# Patient Record
Sex: MALE | Race: WHITE | Employment: FULL TIME | URBAN - METROPOLITAN AREA
[De-identification: names, ages, dates, MRNs, and addresses within clinical notes are randomized per-mention and may not be internally consistent; named-entity substitution may affect disease eponyms.]

---

## 2017-01-10 NOTE — PROGRESS NOTES
Chief Complaint:   Patient presents with:  Medication Follow-Up    HPI:   This is a 42year old male presenting for follow-up on ADD. Stable with extended release as well as short acting. He reports no side effects focus seems to be doing well.   Patient Status: Never Used                        Alcohol Use: Yes             Family History:  History reviewed. No pertinent family history.   Allergies:  No Known Allergies  Current Meds:    Current Outpatient Prescriptions:  Amphetamine-Dextroamphet ER (ADDERAL by mouth every 6 (six) hours as needed for Pain. Disp:  Rfl:       Counseling given: Not Answered       REVIEW OF SYSTEMS:   Review of Systems   Constitutional: Negative for fever, chills, diaphoresis and fatigue.    HENT: Negative for congestion, ear pain, well-developed and well-nourished. No distress. HENT:   Head: Normocephalic and atraumatic. Nose: Nose normal.   Mouth/Throat: Oropharynx is clear and moist. No oropharyngeal exudate or pharynx erythema.    Eyes: Conjunctivae and EOM are normal. Pupils SR 24 Hr; Take 1 capsule (20 mg total) by mouth daily.  -     Amphetamine-Dextroamphet ER (ADDERALL XR) 20 MG Oral Capsule SR 24 Hr; Take 1 capsule (20 mg total) by mouth daily. -     amphetamine-dextroamphetamine (ADDERALL) 10 MG Oral Tab;  Take 1 tablet

## 2017-01-11 ENCOUNTER — OFFICE VISIT (OUTPATIENT)
Dept: FAMILY MEDICINE CLINIC | Facility: CLINIC | Age: 42
End: 2017-01-11

## 2017-01-11 ENCOUNTER — HOSPITAL ENCOUNTER (OUTPATIENT)
Dept: GENERAL RADIOLOGY | Age: 42
Discharge: HOME | End: 2017-01-11
Attending: NURSE PRACTITIONER
Payer: COMMERCIAL

## 2017-01-11 VITALS
RESPIRATION RATE: 16 BRPM | BODY MASS INDEX: 38 KG/M2 | HEART RATE: 72 BPM | OXYGEN SATURATION: 98 % | WEIGHT: 279 LBS | TEMPERATURE: 98 F | SYSTOLIC BLOOD PRESSURE: 122 MMHG | DIASTOLIC BLOOD PRESSURE: 80 MMHG

## 2017-01-11 DIAGNOSIS — M79.671 PAIN OF RIGHT HEEL: ICD-10-CM

## 2017-01-11 DIAGNOSIS — M72.2 PLANTAR FASCIITIS, RIGHT: ICD-10-CM

## 2017-01-11 DIAGNOSIS — M79.671 PAIN OF RIGHT HEEL: Primary | ICD-10-CM

## 2017-01-11 PROCEDURE — 73650 X-RAY EXAM OF HEEL: CPT

## 2017-01-11 PROCEDURE — 99214 OFFICE O/P EST MOD 30 MIN: CPT | Performed by: NURSE PRACTITIONER

## 2017-01-11 RX ORDER — NAPROXEN 500 MG/1
500 TABLET ORAL 2 TIMES DAILY WITH MEALS
Qty: 28 TABLET | Refills: 0 | Status: SHIPPED | OUTPATIENT
Start: 2017-01-11 | End: 2017-01-25

## 2017-01-11 NOTE — PROGRESS NOTES
Patient presents with:  Heel Pain: Right heel    HPI:   Savana Dyson is a 42year old male present with complain of right heel pain. Patient reports he is on his feet at work walking on concrete floors all day long. No injury or trauma reported.   Ons on exertion  GI: denies abdominal pain and denies heartburn  NEURO: denies headaches  Musculoskeletal: heel pain as described above.      EXAM:   /80 mmHg  Pulse 72  Temp(Src) 98.2 °F (36.8 °C) (Oral)  Resp 16  Wt 279 lb  SpO2 98%  GENERAL: well devel

## 2017-02-07 ENCOUNTER — OFFICE VISIT (OUTPATIENT)
Dept: FAMILY MEDICINE CLINIC | Facility: CLINIC | Age: 42
End: 2017-02-07

## 2017-02-07 VITALS
RESPIRATION RATE: 18 BRPM | OXYGEN SATURATION: 98 % | DIASTOLIC BLOOD PRESSURE: 78 MMHG | TEMPERATURE: 98 F | SYSTOLIC BLOOD PRESSURE: 128 MMHG | HEART RATE: 88 BPM

## 2017-02-07 DIAGNOSIS — M79.671 PAIN OF RIGHT HEEL: Primary | ICD-10-CM

## 2017-02-07 PROCEDURE — 99212 OFFICE O/P EST SF 10 MIN: CPT | Performed by: PHYSICIAN ASSISTANT

## 2017-02-07 RX ORDER — NAPROXEN 500 MG/1
500 TABLET ORAL 2 TIMES DAILY WITH MEALS
Qty: 20 TABLET | Refills: 0 | Status: SHIPPED | OUTPATIENT
Start: 2017-02-07 | End: 2017-02-17

## 2017-02-07 NOTE — PROGRESS NOTES
CHIEF COMPLAINT:     Patient presents with:  Heel Pain: right heel pain. HPI:   Abelardo Davenport is a 42year old male who presents with complaints of right heel pain. Pain is rated as a 8/10.   Patient denies prior injury- has had this type of pain NEURO: Denies numbness, tingling, or weakness of extremities.     EXAM:   /78 mmHg  Pulse 88  Temp(Src) 98.4 °F (36.9 °C) (Oral)  Resp 18  SpO2 98%  GENERAL: well developed, well nourished, and in no apparent distress  SKIN: no rashes, no suspicious l Risk factors include: non-active lifestyle, arthritis, diabetes, obesity or recent weight gain, flat foot, high arch. Wearing high heels, loose shoes, or shoes with poor arch support for long periods of time adds to the risk.  This problem is commonly found · You may use over-the-counter pain medicine to control pain, unless another medicine was prescribed. Anti-inflammatory pain medicines, such as ibuprofen or naproxen, may work better than acetaminophen.  If you have chronic liver or kidney disease or ever h · Do activities that require a lot of running, jumping, or dancing  · Have a job that requires being on your feet for long periods  · Are overweight or obese  · Have certain foot problems, such as a tight Achilles tendon, flat feet, or high arches  · Often Possible complications of plantar fasciitis  Without proper care and treatment, healing may take longer than normal. Also, symptoms may continue or get worse. Over time, the plantar fascia may be damaged.  This can make it hard to walk or even stand without Every time your foot strikes the ground, the plantar fascia is stretched. You can reduce the strain on the plantar fascia and the possibility of overuse by following these suggestions:  · Lose any excess weight. · Avoid running on hard or uneven ground.

## 2017-02-07 NOTE — PATIENT INSTRUCTIONS
Plantar Fasciitis  Plantar fasciitis is a painful swelling of the plantar fascia. The plantar fascia is a thick, fibrous layer of tissue. It covers the bones on the bottom of your foot. And it supports the foot bones in an arched position.   This can happ · First thing in the morning and before sports, stretch the bottom of your feet. Gently flex your ankle so the toes move toward your knee. · Icing may help control heel pain. Apply an ice pack to the heel for 10-20 minutes as a preventive.  Or ice your tommy What causes plantar fasciitis? Plantar fasciitis most often occurs from overusing the plantar fascia. The tissue may become damaged from activities that put repeated stress on the heel and foot. Or it may wear down over time with age and ankle stiffness. · Doing exercises and physical therapy. These stretch and strengthen the plantar fascia and the muscles in the leg that support the heel and foot. · Getting shots of medicine into the foot. These may help relieve symptoms for a time. · Having surgery.  Douglas Yousif · To reduce symptoms caused by poor foot mechanics, your foot may be taped. This supports the arch and temporarily controls movement. Night splints may also help by stretching the fascia.   Control movement  If taping helps, your healthcare provider may pre

## 2017-02-10 ENCOUNTER — OFFICE VISIT (OUTPATIENT)
Dept: SLEEP CENTER | Facility: HOSPITAL | Age: 42
End: 2017-02-10
Attending: FAMILY MEDICINE
Payer: COMMERCIAL

## 2017-02-10 PROCEDURE — 95810 POLYSOM 6/> YRS 4/> PARAM: CPT

## 2017-02-15 NOTE — PROGRESS NOTES
Quick Note:    Pt will be contacted by CPAP coordinator and sleep consultation will be scheduled.   ______

## 2017-02-15 NOTE — PROCEDURES
[address] [address],[facility]       Accredited by the [facility] of Sleep Medicine (AASM)    PATIENT'S NAME:        LUZMARIA Willett  ATTENDING PHYSICIAN:   Glenna Chun M.D. REFERRING PHYSICIAN:   Kenneth Nair M.D.   PAT while in the lateral position and mostly during stage REM sleep with a REM AHI of 36 and non-REM AHI of 2. The patient had brief oxyhemoglobin desaturations with an oxygen saturation tiarra of 73%.   He spent approximately 1% of sleep time with oxygen level

## 2017-02-17 NOTE — TELEPHONE ENCOUNTER
Cialis LF: 10/10/16  Bupropion LF: 1/10/17  LOV: 1/10/17  Please approve or deny pending Rx. Thank you!

## 2017-02-17 NOTE — TELEPHONE ENCOUNTER
From: Chon Grider  To: Dwight Zuluaga MD  Sent: 2/16/2017 5:54 PM CST  Subject: Medication Renewal Request    Original authorizing provider: MD Chon Lee would like a refill of the following medications:   Tadalafil (CIALIS) 2

## 2017-03-10 PROBLEM — G47.33 OSA (OBSTRUCTIVE SLEEP APNEA): Status: ACTIVE | Noted: 2017-03-10

## 2017-03-10 NOTE — PROGRESS NOTES
Chief Complaint:   Patient presents with: Follow - Up    HPI:   This is a 42year old male presenting for follow-up on ADD. Stable with extended release as well as short acting. He reports no side effects focus seems to be doing well.   Patient reports o 24 Hr Take 1 capsule (20 mg total) by mouth daily. Disp: 30 capsule Rfl: 0   [START ON 5/9/2017] Amphetamine-Dextroamphet ER (ADDERALL XR) 20 MG Oral Capsule SR 24 Hr Take 1 capsule (20 mg total) by mouth daily.  Disp: 30 capsule Rfl: 0   BuPROPion HCl ER, Negative for dysuria, hematuria, flank pain, difficulty urinating and sexual dysfunction. Musculoskeletal: Negative for back pain, joint pain, gait problem, neck pain and neck stiffness. Skin: Negative for color change, pallor, rash and wound.    Allerg guarding. Musculoskeletal: Normal range of motion. He exhibits no tenderness or effusion. Lymphadenopathy:     He has no cervical adenopathy. Neurological: He is alert and oriented to person, place, and time.  No cranial nerve deficit or motor deficit

## 2017-03-20 ENCOUNTER — TELEPHONE (OUTPATIENT)
Dept: FAMILY MEDICINE CLINIC | Facility: CLINIC | Age: 42
End: 2017-03-20

## 2017-03-20 DIAGNOSIS — G47.33 OSA (OBSTRUCTIVE SLEEP APNEA): ICD-10-CM

## 2017-03-20 DIAGNOSIS — R06.83 SNORING: Primary | ICD-10-CM

## 2017-03-20 NOTE — TELEPHONE ENCOUNTER
Requesting a referral for Cpap titration, patient has an appointment in 2 weeks. If any questions please call John Silverman at [phone].

## 2017-03-20 NOTE — TELEPHONE ENCOUNTER
Called Rio and spoke with her. She states that [facility] requested that she gets a new order from the PCP. Patient is having CPAP titration sleep study done. Order placed in Epic for patient as requested. Rio notified of this information.

## 2017-04-10 ENCOUNTER — TELEPHONE (OUTPATIENT)
Dept: FAMILY MEDICINE CLINIC | Facility: CLINIC | Age: 42
End: 2017-04-10

## 2017-04-12 NOTE — TELEPHONE ENCOUNTER
Please contact pt and schedule the physical first so there are no coding/billing issues with lab orders. (Pt needs to have a physical in order to get physical labs covered.   He can do labs prior to physical, but we need it scheduled)  Some pts end up sche

## 2017-04-14 ENCOUNTER — OFFICE VISIT (OUTPATIENT)
Dept: SLEEP CENTER | Facility: HOSPITAL | Age: 42
End: 2017-04-14
Attending: INTERNAL MEDICINE
Payer: COMMERCIAL

## 2017-04-14 PROCEDURE — 95811 POLYSOM 6/>YRS CPAP 4/> PARM: CPT

## 2017-04-19 NOTE — PROCEDURES
[address] [address]       Accredited by the [facility] ([place] [facility])    PATIENT'S NAME:        LUZMARIA Cam  ATTENDING PHYSICIAN:   Mo Ferraro M.D. REFERRING PHYSICIAN:   Mo Ferraro M.D.   PAT MEASURES:  The patient was initiated on CPAP at 5 cm of water and titrated up to a final pressure of 10 cm of water. On this setting, the patient had an apnea-hypopnea index of 2 with an oxygen saturation tiarra of 90%.   While on this setting, the patient

## 2017-04-19 NOTE — PROGRESS NOTES
Quick Note:    Pt will be notified by CPAP coordinator and f/u in sleep clinic will be arranged.   ______

## 2017-06-02 ENCOUNTER — APPOINTMENT (OUTPATIENT)
Dept: GENERAL RADIOLOGY | Age: 42
End: 2017-06-02
Attending: PHYSICIAN ASSISTANT
Payer: COMMERCIAL

## 2017-06-02 ENCOUNTER — HOSPITAL ENCOUNTER (OUTPATIENT)
Age: 42
Discharge: HOME | End: 2017-06-02
Payer: COMMERCIAL

## 2017-06-02 VITALS
OXYGEN SATURATION: 99 % | HEART RATE: 81 BPM | WEIGHT: 276 LBS | SYSTOLIC BLOOD PRESSURE: 148 MMHG | DIASTOLIC BLOOD PRESSURE: 97 MMHG | RESPIRATION RATE: 20 BRPM | TEMPERATURE: 98 F | BODY MASS INDEX: 37.38 KG/M2 | HEIGHT: 72 IN

## 2017-06-02 DIAGNOSIS — S93.402A MODERATE LEFT ANKLE SPRAIN, INITIAL ENCOUNTER: Primary | ICD-10-CM

## 2017-06-02 PROCEDURE — 73610 X-RAY EXAM OF ANKLE: CPT | Performed by: PHYSICIAN ASSISTANT

## 2017-06-02 PROCEDURE — 99203 OFFICE O/P NEW LOW 30 MIN: CPT

## 2017-06-02 PROCEDURE — 99213 OFFICE O/P EST LOW 20 MIN: CPT

## 2017-06-02 NOTE — ED PROVIDER NOTES
Patient Seen in: THE [facility] In [place]    History   Patient presents with:   Ankle Injury: Lt.    Stated Complaint: LT ANKLE SPRAIN    HPI    43-year-old male who comes in today complaining of left ankle and foot pain after playing dodge ba Use: Yes               Review of Systems    Positive for stated complaint: LT ANKLE SPRAIN  Other systems are as noted in HPI. Constitutional and vital signs reviewed. All other systems reviewed and negative except as noted above.     PSFH elements re ankle mortise. Questionable area of old fracture or erosion involving the anterior most articular surface of the distal tibia as seen on the lateral view. Versus artifact. The medial and lateral malleolus appear intact as does the talus.  Incidental note ma

## 2017-06-02 NOTE — ED INITIAL ASSESSMENT (HPI)
Pt. Reports he injured his Lt. Ankle/foot about 5 days ago playing [employer]. Stepped on a ball with back part of foot. Foot rolled probably outward. States there was some swelling & discomfort, took ibuprofen & iced it.  The pain & swelling got worse in t

## 2017-06-28 DIAGNOSIS — N52.9 ERECTILE DYSFUNCTION, UNSPECIFIED ERECTILE DYSFUNCTION TYPE: ICD-10-CM

## 2017-06-28 RX ORDER — DEXTROAMPHETAMINE SACCHARATE, AMPHETAMINE ASPARTATE MONOHYDRATE, DEXTROAMPHETAMINE SULFATE AND AMPHETAMINE SULFATE 5; 5; 5; 5 MG/1; MG/1; MG/1; MG/1
20 CAPSULE, EXTENDED RELEASE ORAL DAILY
Qty: 30 CAPSULE | Refills: 0 | Status: SHIPPED | OUTPATIENT
Start: 2017-07-28 | End: 2017-08-27

## 2017-06-28 RX ORDER — DEXTROAMPHETAMINE SACCHARATE, AMPHETAMINE ASPARTATE MONOHYDRATE, DEXTROAMPHETAMINE SULFATE AND AMPHETAMINE SULFATE 5; 5; 5; 5 MG/1; MG/1; MG/1; MG/1
20 CAPSULE, EXTENDED RELEASE ORAL DAILY
Qty: 30 CAPSULE | Refills: 0 | Status: CANCELLED
Start: 2017-06-28 | End: 2017-07-28

## 2017-06-28 RX ORDER — TADALAFIL 20 MG/1
20 TABLET ORAL AS NEEDED
Qty: 24 TABLET | Refills: 0 | Status: SHIPPED | OUTPATIENT
Start: 2017-06-28 | End: 2017-12-13

## 2017-06-28 RX ORDER — DEXTROAMPHETAMINE SACCHARATE, AMPHETAMINE ASPARTATE MONOHYDRATE, DEXTROAMPHETAMINE SULFATE AND AMPHETAMINE SULFATE 5; 5; 5; 5 MG/1; MG/1; MG/1; MG/1
20 CAPSULE, EXTENDED RELEASE ORAL DAILY
Qty: 30 CAPSULE | Refills: 0 | Status: SHIPPED | OUTPATIENT
Start: 2017-06-28 | End: 2017-07-28

## 2017-06-28 RX ORDER — DEXTROAMPHETAMINE SACCHARATE, AMPHETAMINE ASPARTATE MONOHYDRATE, DEXTROAMPHETAMINE SULFATE AND AMPHETAMINE SULFATE 5; 5; 5; 5 MG/1; MG/1; MG/1; MG/1
20 CAPSULE, EXTENDED RELEASE ORAL DAILY
Qty: 30 CAPSULE | Refills: 0 | Status: SHIPPED | OUTPATIENT
Start: 2017-08-27 | End: 2017-09-26

## 2017-06-28 NOTE — TELEPHONE ENCOUNTER
From: Zander Aiken  Sent: 6/28/2017 7:09 AM CDT  Subject: Medication Renewal Request    Zander Aiken would like a refill of the following medications:   Tadalafil (CIALIS) 20 MG Oral Tab Anastasia Cheadle, MD]    Preferred pharmacy: [facility]

## 2017-06-29 ENCOUNTER — TELEPHONE (OUTPATIENT)
Dept: FAMILY MEDICINE CLINIC | Facility: CLINIC | Age: 42
End: 2017-06-29

## 2017-06-29 RX ORDER — DEXTROAMPHETAMINE SACCHARATE, AMPHETAMINE ASPARTATE, DEXTROAMPHETAMINE SULFATE AND AMPHETAMINE SULFATE 2.5; 2.5; 2.5; 2.5 MG/1; MG/1; MG/1; MG/1
10 TABLET ORAL DAILY
Qty: 30 TABLET | Refills: 0 | Status: SHIPPED | OUTPATIENT
Start: 2017-07-29 | End: 2017-08-28

## 2017-06-29 RX ORDER — DEXTROAMPHETAMINE SACCHARATE, AMPHETAMINE ASPARTATE, DEXTROAMPHETAMINE SULFATE AND AMPHETAMINE SULFATE 2.5; 2.5; 2.5; 2.5 MG/1; MG/1; MG/1; MG/1
10 TABLET ORAL DAILY
Qty: 30 TABLET | Refills: 0 | Status: SHIPPED | OUTPATIENT
Start: 2017-06-29 | End: 2017-07-29

## 2017-06-29 RX ORDER — DEXTROAMPHETAMINE SACCHARATE, AMPHETAMINE ASPARTATE, DEXTROAMPHETAMINE SULFATE AND AMPHETAMINE SULFATE 2.5; 2.5; 2.5; 2.5 MG/1; MG/1; MG/1; MG/1
10 TABLET ORAL DAILY
Qty: 30 TABLET | Refills: 0 | Status: SHIPPED | OUTPATIENT
Start: 2017-08-28 | End: 2017-09-27

## 2017-06-29 NOTE — TELEPHONE ENCOUNTER
From: Rakesh Sears  Sent: 6/28/2017 6:00 PM CDT  Subject: Medication Renewal Request    Rakesh Sears would like a refill of the following medications:  Amphetamine-Dextroamphet ER (ADDERALL XR) 20 MG Oral Capsule SR 24 Hr Paloma Peña MD]    P

## 2017-10-09 ENCOUNTER — OFFICE VISIT (OUTPATIENT)
Dept: FAMILY MEDICINE CLINIC | Facility: CLINIC | Age: 42
End: 2017-10-09

## 2017-10-09 VITALS
HEIGHT: 72 IN | DIASTOLIC BLOOD PRESSURE: 90 MMHG | SYSTOLIC BLOOD PRESSURE: 150 MMHG | RESPIRATION RATE: 16 BRPM | HEART RATE: 90 BPM | WEIGHT: 248 LBS | OXYGEN SATURATION: 97 % | TEMPERATURE: 98 F | BODY MASS INDEX: 33.59 KG/M2

## 2017-10-09 DIAGNOSIS — M25.572 CHRONIC PAIN OF LEFT ANKLE: ICD-10-CM

## 2017-10-09 DIAGNOSIS — M70.71 BURSITIS OF BOTH HIPS, UNSPECIFIED BURSA: ICD-10-CM

## 2017-10-09 DIAGNOSIS — Z23 NEEDS FLU SHOT: Primary | ICD-10-CM

## 2017-10-09 DIAGNOSIS — G89.29 CHRONIC PAIN OF LEFT ANKLE: ICD-10-CM

## 2017-10-09 DIAGNOSIS — M70.72 BURSITIS OF BOTH HIPS, UNSPECIFIED BURSA: ICD-10-CM

## 2017-10-09 DIAGNOSIS — F98.8 ATTENTION DEFICIT DISORDER (ADD) WITHOUT HYPERACTIVITY: ICD-10-CM

## 2017-10-09 DIAGNOSIS — S96.912A MUSCLE STRAIN OF LEFT FOOT, INITIAL ENCOUNTER: ICD-10-CM

## 2017-10-09 PROCEDURE — 90471 IMMUNIZATION ADMIN: CPT | Performed by: FAMILY MEDICINE

## 2017-10-09 PROCEDURE — 90686 IIV4 VACC NO PRSV 0.5 ML IM: CPT | Performed by: FAMILY MEDICINE

## 2017-10-09 PROCEDURE — 99214 OFFICE O/P EST MOD 30 MIN: CPT | Performed by: FAMILY MEDICINE

## 2017-10-09 RX ORDER — DEXTROAMPHETAMINE SACCHARATE, AMPHETAMINE ASPARTATE, DEXTROAMPHETAMINE SULFATE AND AMPHETAMINE SULFATE 2.5; 2.5; 2.5; 2.5 MG/1; MG/1; MG/1; MG/1
10 TABLET ORAL DAILY
Qty: 30 TABLET | Refills: 0 | Status: SHIPPED | OUTPATIENT
Start: 2017-10-09 | End: 2017-11-08

## 2017-10-09 RX ORDER — DEXTROAMPHETAMINE SACCHARATE, AMPHETAMINE ASPARTATE, DEXTROAMPHETAMINE SULFATE AND AMPHETAMINE SULFATE 2.5; 2.5; 2.5; 2.5 MG/1; MG/1; MG/1; MG/1
10 TABLET ORAL DAILY
Qty: 30 TABLET | Refills: 0 | Status: SHIPPED | OUTPATIENT
Start: 2017-12-08 | End: 2018-01-07

## 2017-10-09 RX ORDER — DEXTROAMPHETAMINE SACCHARATE, AMPHETAMINE ASPARTATE MONOHYDRATE, DEXTROAMPHETAMINE SULFATE AND AMPHETAMINE SULFATE 5; 5; 5; 5 MG/1; MG/1; MG/1; MG/1
20 CAPSULE, EXTENDED RELEASE ORAL DAILY
Qty: 30 CAPSULE | Refills: 0 | Status: SHIPPED | OUTPATIENT
Start: 2017-12-08 | End: 2018-01-07

## 2017-10-09 RX ORDER — DEXTROAMPHETAMINE SACCHARATE, AMPHETAMINE ASPARTATE MONOHYDRATE, DEXTROAMPHETAMINE SULFATE AND AMPHETAMINE SULFATE 5; 5; 5; 5 MG/1; MG/1; MG/1; MG/1
20 CAPSULE, EXTENDED RELEASE ORAL DAILY
Qty: 30 CAPSULE | Refills: 0 | Status: SHIPPED | OUTPATIENT
Start: 2017-10-09 | End: 2017-11-08

## 2017-10-09 RX ORDER — DEXTROAMPHETAMINE SACCHARATE, AMPHETAMINE ASPARTATE, DEXTROAMPHETAMINE SULFATE AND AMPHETAMINE SULFATE 2.5; 2.5; 2.5; 2.5 MG/1; MG/1; MG/1; MG/1
10 TABLET ORAL DAILY
Qty: 30 TABLET | Refills: 0 | Status: SHIPPED | OUTPATIENT
Start: 2017-11-08 | End: 2017-12-08

## 2017-10-09 RX ORDER — DEXTROAMPHETAMINE SACCHARATE, AMPHETAMINE ASPARTATE MONOHYDRATE, DEXTROAMPHETAMINE SULFATE AND AMPHETAMINE SULFATE 5; 5; 5; 5 MG/1; MG/1; MG/1; MG/1
20 CAPSULE, EXTENDED RELEASE ORAL DAILY
Qty: 30 CAPSULE | Refills: 0 | Status: SHIPPED | OUTPATIENT
Start: 2017-11-08 | End: 2017-12-08

## 2017-10-09 RX ORDER — IBUPROFEN AND FAMOTIDINE 800; 26.6 MG/1; MG/1
TABLET, COATED ORAL
Refills: 0 | COMMUNITY
Start: 2017-09-15 | End: 2018-06-01

## 2017-10-09 NOTE — PROGRESS NOTES
Chief Complaint:   Patient presents with: Follow - Up: medication FU, left ankle painful to walk, left knee, left stomach pain    HPI:   This is a 36year old male presenting for follow up on left ankle pain, left knee pain and left foot pain x few month. Outpatient Prescriptions:  DUEXIS 800-26.6 MG Oral Tab TK 1 T PO Q 6 TO 8 H PRN Disp:  Rfl: 0   Amphetamine-Dextroamphet ER (ADDERALL XR) 20 MG Oral Capsule SR 24 Hr Take 1 capsule (20 mg total) by mouth daily.  Disp: 30 capsule Rfl: 0   [START ON 11/8/2017 palpitations and leg swelling. Gastrointestinal: Negative for vomiting, abdominal pain, diarrhea, blood in stool and abdominal distention. Endocrine: Negative for cold intolerance, heat intolerance, polydipsia, polyphagia and polyuria.    Genitourinary: rate, regular rhythm, normal heart sounds and intact distal pulses. No murmur heard. Edema not present. Pulmonary/Chest: Effort normal and breath sounds normal. No stridor. No respiratory distress. He has no wheezes. Abdominal: Soft.  Bowel sounds a shot    - INFLUENZA VIRUS VACCINE, QUAD, PRESERVATIVE FREE, 0.5 ML    Follow up in 3 months.

## 2017-10-18 DIAGNOSIS — F41.1 GAD (GENERALIZED ANXIETY DISORDER): ICD-10-CM

## 2017-10-19 RX ORDER — BUPROPION HYDROCHLORIDE 150 MG/1
TABLET, EXTENDED RELEASE ORAL
Qty: 60 TABLET | Refills: 0 | Status: SHIPPED | OUTPATIENT
Start: 2017-10-19 | End: 2017-11-27

## 2017-11-20 PROBLEM — S93.402A SPRAIN OF LEFT ANKLE, UNSPECIFIED LIGAMENT, INITIAL ENCOUNTER: Status: ACTIVE | Noted: 2017-11-20

## 2017-11-27 DIAGNOSIS — F41.1 GAD (GENERALIZED ANXIETY DISORDER): ICD-10-CM

## 2017-11-28 RX ORDER — BUPROPION HYDROCHLORIDE 150 MG/1
150 TABLET, EXTENDED RELEASE ORAL 2 TIMES DAILY
Qty: 60 TABLET | Refills: 0 | Status: SHIPPED | OUTPATIENT
Start: 2017-11-28 | End: 2017-12-23

## 2017-11-28 NOTE — TELEPHONE ENCOUNTER
From: Yuriy Rodrigues  Sent: 11/27/2017 6:26 PM CST  Subject: Medication Renewal Request    Yuriy Rodrigues would like a refill of the following medications:     BUPROPION HCL ER, SR, 150 MG Oral Tablet 12 Hr Emily Mohamud MD]    Preferred pharmacy:

## 2017-11-29 ENCOUNTER — HOSPITAL ENCOUNTER (OUTPATIENT)
Dept: MRI IMAGING | Age: 42
Discharge: HOME | End: 2017-11-29
Attending: ORTHOPAEDIC SURGERY
Payer: COMMERCIAL

## 2017-11-29 DIAGNOSIS — S93.402A SPRAIN OF UNSPECIFIED LIGAMENT OF LEFT ANKLE, INITIAL ENCOUNTER: ICD-10-CM

## 2017-11-29 PROCEDURE — 73721 MRI JNT OF LWR EXTRE W/O DYE: CPT | Performed by: ORTHOPAEDIC SURGERY

## 2017-12-13 DIAGNOSIS — N52.9 ERECTILE DYSFUNCTION, UNSPECIFIED ERECTILE DYSFUNCTION TYPE: ICD-10-CM

## 2017-12-13 NOTE — TELEPHONE ENCOUNTER
LOV 10/9/17           LF 6/28/17 #24 with 0 refills      Please approve or deny Rx request.  Thank you!

## 2017-12-13 NOTE — TELEPHONE ENCOUNTER
From: Areli Rodriguez  Sent: 12/13/2017 1:46 PM CST  Subject: Medication Renewal [address] [place] [address] would like a refill of the following medications:      Tadalafil (CIALIS) 20 MG Oral Tab Carlie Abebe MD]    Preferred pharmacy: [facility]

## 2017-12-14 PROBLEM — S86.312A TEAR OF PERONEAL TENDON, LEFT, INITIAL ENCOUNTER: Status: ACTIVE | Noted: 2017-12-14

## 2017-12-14 RX ORDER — TADALAFIL 20 MG/1
20 TABLET ORAL AS NEEDED
Qty: 24 TABLET | Refills: 0 | Status: SHIPPED | OUTPATIENT
Start: 2017-12-14 | End: 2018-05-30

## 2017-12-16 ENCOUNTER — OFFICE VISIT (OUTPATIENT)
Dept: FAMILY MEDICINE CLINIC | Facility: CLINIC | Age: 42
End: 2017-12-16

## 2017-12-16 VITALS
OXYGEN SATURATION: 98 % | RESPIRATION RATE: 18 BRPM | DIASTOLIC BLOOD PRESSURE: 86 MMHG | BODY MASS INDEX: 37.79 KG/M2 | TEMPERATURE: 99 F | HEIGHT: 72 IN | WEIGHT: 279 LBS | SYSTOLIC BLOOD PRESSURE: 142 MMHG | HEART RATE: 84 BPM

## 2017-12-16 DIAGNOSIS — J11.1 INFLUENZA-LIKE ILLNESS: Primary | ICD-10-CM

## 2017-12-16 PROCEDURE — 99213 OFFICE O/P EST LOW 20 MIN: CPT | Performed by: NURSE PRACTITIONER

## 2017-12-16 RX ORDER — CODEINE PHOSPHATE AND GUAIFENESIN 10; 100 MG/5ML; MG/5ML
10 SOLUTION ORAL NIGHTLY PRN
Qty: 118 ML | Refills: 0 | Status: SHIPPED | OUTPATIENT
Start: 2017-12-16 | End: 2017-12-23

## 2017-12-16 NOTE — PATIENT INSTRUCTIONS
Humidifier in room  Sleep propped  Push fluids  Limit dairy  Mucinex as directed      Influenza (Adult)    Influenza is also called the flu. It is a viral illness that affects the air passages of your lungs. It is different from the common cold.  The flu Follow up with your healthcare provider, or as advised, if you are not getting better over the next week. If you are age 72 or older, talk with your provider about getting a pneumococcal vaccine every 5 years.  You should also get this vaccine if you have

## 2017-12-16 NOTE — PROGRESS NOTES
Patient presents with:  Fever: cough, sore throat, right side ear problem, fever at home 101, loss of appetite, nausea, fatigue, dizziness, sinus congestion x4 days (fever reducer and cough medicine taken)  :    HPI:   Radha Chris is a 36year old m • Osteoarthrosis, unspecified whether generalized or localized, unspecified site       Past Surgical History:  No date: ANKLE FRACTURE SURGERY  10/26/2015: DRAIN/INJECT LARGE JOINT/BURSA Bilateral      Comment: Procedure: BURSA INJECTION;  Surgeon: Andi Gallagher, GI: good BS's,no masses, HSM or tenderness  EXTREMITIES: no cyanosis, clubbing or edema  LYMPH:  + anterior cervical lymphadenopathy. ASSESSMENT AND PLAN:   Jamal Eisenberg is a 36year old male who presents with influenza.     ASSESSMENT:  Influe Symptoms of the flu may be mild or severe. They can include extreme tiredness (wanting to stay in bed all day), chills, fevers, muscle aches, soreness with eye movement, headache, and a dry, hacking cough.   Home care  Follow these guidelines when caring fo · Severe weakness or dizziness  · You get a new fever or cough after getting better for a few days  Date Last Reviewed: 1/1/2017  © 4852-2452 [employer]. [address] [facility], [address] [place] [address]. All rights reserved.  This information is not

## 2017-12-18 ENCOUNTER — TELEPHONE (OUTPATIENT)
Dept: FAMILY MEDICINE CLINIC | Facility: CLINIC | Age: 42
End: 2017-12-18

## 2017-12-22 NOTE — TELEPHONE ENCOUNTER
PA for Cialis approved through Cover My Meds. Message left for pharmacist at [facility] that fill script and notify patient.

## 2017-12-23 DIAGNOSIS — F41.1 GAD (GENERALIZED ANXIETY DISORDER): ICD-10-CM

## 2017-12-26 RX ORDER — BUPROPION HYDROCHLORIDE 150 MG/1
TABLET, EXTENDED RELEASE ORAL
Qty: 60 TABLET | Refills: 0 | Status: SHIPPED | OUTPATIENT
Start: 2017-12-26 | End: 2018-01-28

## 2017-12-28 ENCOUNTER — OFFICE VISIT (OUTPATIENT)
Dept: PHYSICAL THERAPY | Age: 42
End: 2017-12-28
Attending: ORTHOPAEDIC SURGERY
Payer: COMMERCIAL

## 2017-12-28 DIAGNOSIS — S93.402A SPRAIN OF LEFT ANKLE, UNSPECIFIED LIGAMENT, INITIAL ENCOUNTER: ICD-10-CM

## 2017-12-28 DIAGNOSIS — S86.312A TEAR OF PERONEAL TENDON, LEFT, INITIAL ENCOUNTER: ICD-10-CM

## 2017-12-28 PROCEDURE — 97162 PT EVAL MOD COMPLEX 30 MIN: CPT

## 2017-12-28 PROCEDURE — 97110 THERAPEUTIC EXERCISES: CPT

## 2017-12-28 NOTE — PROGRESS NOTES
LOWER EXTREMITY EVALUATION:   Referring Physician: Dr. Tutu Rodriguez  Diagnosis: Tear of peroneal tendon, left, initial encounter (F34.899P)     Date of Service: 12/28/2017     PATIENT [address] Lee is a 36year old y/o male who presents to therapy MRI-  ATFL and CFL tear and peroneus brevis tear. ASSESSMENT  Mr. Babatunde Taveras has a hx of significant bilateral ankle sprains. He demonstrates L ankle lateral laxity and subacute stage of condition.  He demonstrates loss of functional strength and contro Accessory motion: Calcaneal eversion Grade 2 restriction bilaterally. MTP D/V and V/D grade 2 restriction bilaterally.  To assessed TC DV and VD further at next sessions    Flexibility:  Hip Flexor: R 5, L 5  Hamstrings: R 65; L 60  Piriformis: R Restrited; Frequency / Duration: Patient will be seen for 2 x/week or a total of 8 visits (authorized)over a 90 day period. Treatment will include: Manual Therapy; Therapeutic Exercises; Neuromuscular Re-education;  Therapeutic Activity; Gait Training; Electrical Stim

## 2018-01-02 ENCOUNTER — APPOINTMENT (OUTPATIENT)
Dept: PHYSICAL THERAPY | Age: 43
End: 2018-01-02
Attending: ORTHOPAEDIC SURGERY
Payer: COMMERCIAL

## 2018-01-03 ENCOUNTER — APPOINTMENT (OUTPATIENT)
Dept: PHYSICAL THERAPY | Age: 43
End: 2018-01-03
Attending: ORTHOPAEDIC SURGERY
Payer: COMMERCIAL

## 2018-01-04 ENCOUNTER — APPOINTMENT (OUTPATIENT)
Dept: PHYSICAL THERAPY | Age: 43
End: 2018-01-04
Attending: ORTHOPAEDIC SURGERY
Payer: COMMERCIAL

## 2018-01-08 ENCOUNTER — OFFICE VISIT (OUTPATIENT)
Dept: PHYSICAL THERAPY | Age: 43
End: 2018-01-08
Attending: ORTHOPAEDIC SURGERY
Payer: COMMERCIAL

## 2018-01-08 PROCEDURE — 97110 THERAPEUTIC EXERCISES: CPT | Performed by: PHYSICAL THERAPIST

## 2018-01-08 PROCEDURE — 97140 MANUAL THERAPY 1/> REGIONS: CPT | Performed by: PHYSICAL THERAPIST

## 2018-01-08 PROCEDURE — 97112 NEUROMUSCULAR REEDUCATION: CPT | Performed by: PHYSICAL THERAPIST

## 2018-01-08 NOTE — PROGRESS NOTES
Referring Physician: Dr. Shahrzad Lee    Dx: Tear of peroneal tendon, left, initial encounter (K99.911Q)          Authorized # of Visits:  8         Next MD visit: none scheduled  Fall Risk: standard         Precautions: n/a           Subjective: Pt states that h 4/ Date:               TX#: 5/ Date:               TX#: 6/ Date:               TX#: 7/ Date:               TX#: 8/   Nustep x 8', L-2         Gastro soleus stretch x 2, 30 sec         BB AP tilts x 1 min         BB balance AP and lat 1 min each         DLP

## 2018-01-09 ENCOUNTER — APPOINTMENT (OUTPATIENT)
Dept: PHYSICAL THERAPY | Age: 43
End: 2018-01-09
Attending: ORTHOPAEDIC SURGERY
Payer: COMMERCIAL

## 2018-01-10 ENCOUNTER — OFFICE VISIT (OUTPATIENT)
Dept: PHYSICAL THERAPY | Age: 43
End: 2018-01-10
Attending: ORTHOPAEDIC SURGERY
Payer: COMMERCIAL

## 2018-01-10 PROCEDURE — 97112 NEUROMUSCULAR REEDUCATION: CPT | Performed by: PHYSICAL THERAPIST

## 2018-01-10 PROCEDURE — 97110 THERAPEUTIC EXERCISES: CPT | Performed by: PHYSICAL THERAPIST

## 2018-01-10 PROCEDURE — 97140 MANUAL THERAPY 1/> REGIONS: CPT | Performed by: PHYSICAL THERAPIST

## 2018-01-10 NOTE — PROGRESS NOTES
Referring Physician: Dr. Anthony Driscoll    Dx: Tear of peroneal tendon, left, initial encounter (Q35.705H)          Authorized # of Visits:  8         Next MD visit: none scheduled  Fall Risk: standard         Precautions: n/a           Subjective: Pt states that h progression.    Date: 1/8/2018 TX#: 2/8  Date: 1/10/2018               TX#: 3/   Date:               TX#: 4/ Date:               TX#: 5/ Date:               TX#: 6/ Date:               TX#: 7/ Date:               TX#: 8/   Nustep x 8', L-2 x        Gastro s

## 2018-01-11 ENCOUNTER — APPOINTMENT (OUTPATIENT)
Dept: PHYSICAL THERAPY | Age: 43
End: 2018-01-11
Attending: ORTHOPAEDIC SURGERY
Payer: COMMERCIAL

## 2018-01-17 ENCOUNTER — OFFICE VISIT (OUTPATIENT)
Dept: PHYSICAL THERAPY | Age: 43
End: 2018-01-17
Attending: ORTHOPAEDIC SURGERY
Payer: COMMERCIAL

## 2018-01-17 PROCEDURE — 97112 NEUROMUSCULAR REEDUCATION: CPT | Performed by: PHYSICAL THERAPIST

## 2018-01-17 PROCEDURE — 97140 MANUAL THERAPY 1/> REGIONS: CPT | Performed by: PHYSICAL THERAPIST

## 2018-01-17 PROCEDURE — 97110 THERAPEUTIC EXERCISES: CPT | Performed by: PHYSICAL THERAPIST

## 2018-01-17 NOTE — PROGRESS NOTES
Referring Physician: Dr. Gogo Hensley    Dx: Tear of peroneal tendon, left, initial encounter (P43.929K)          Authorized # of Visits:  8         Next MD visit: none scheduled  Fall Risk: standard         Precautions: n/a           Subjective: Pt states that h TX#: 5/ Date:               TX#: 6/ Date:               TX#: 7/ Date:               TX#: 8/   Nustep x 8', L-2 x X 8' L-2        Gastro soleus stretch x 2, 30 sec x X 1 min each        BB AP tilts x 1 min X  X 1 min AP & lat       BB balance AP and l

## 2018-01-22 ENCOUNTER — OFFICE VISIT (OUTPATIENT)
Dept: PHYSICAL THERAPY | Age: 43
End: 2018-01-22
Attending: ORTHOPAEDIC SURGERY
Payer: COMMERCIAL

## 2018-01-22 PROCEDURE — 97112 NEUROMUSCULAR REEDUCATION: CPT | Performed by: PHYSICAL THERAPIST

## 2018-01-22 PROCEDURE — 97140 MANUAL THERAPY 1/> REGIONS: CPT | Performed by: PHYSICAL THERAPIST

## 2018-01-22 PROCEDURE — 97110 THERAPEUTIC EXERCISES: CPT | Performed by: PHYSICAL THERAPIST

## 2018-01-22 NOTE — PROGRESS NOTES
Referring Physician: Dr. Mark Serrano    Dx: Tear of peroneal tendon, left, initial encounter (W31.574Z)          Authorized # of Visits:  8         Next MD visit: none scheduled  Fall Risk: standard         Precautions: n/a           Subjective: Pt states rates 1/8/2018 TX#: 2/8  Date: 1/10/2018               TX#: 3/   Date:   1/17/2018            TX#: 4/ Date: 1/22/2018             TX#: 5/ Date:               TX#: 6/ Date:               TX#: 7/ Date:               TX#: 8/   Nustep x 8', L-2 x X 8' L-2  X 8'

## 2018-01-24 ENCOUNTER — OFFICE VISIT (OUTPATIENT)
Dept: PHYSICAL THERAPY | Age: 43
End: 2018-01-24
Attending: ORTHOPAEDIC SURGERY
Payer: COMMERCIAL

## 2018-01-24 PROCEDURE — 97140 MANUAL THERAPY 1/> REGIONS: CPT | Performed by: PHYSICAL THERAPIST

## 2018-01-24 PROCEDURE — 97110 THERAPEUTIC EXERCISES: CPT | Performed by: PHYSICAL THERAPIST

## 2018-01-24 PROCEDURE — 97112 NEUROMUSCULAR REEDUCATION: CPT | Performed by: PHYSICAL THERAPIST

## 2018-01-24 NOTE — PROGRESS NOTES
Referring Physician: Dr. Shaun Hernandez    Progress note:     Dx:  Tear of peroneal tendon, left, initial encounter (R41.405O)          Authorized # of Visits:  8         Next MD visit: none scheduled  Fall Risk: standard         Precautions: n/a           Hernandez restore normal joint mechanics and decrease pain ;  Therapeutic exercises including ROM, strengthening, stretching program; Neuromuscular re-education for proprioceptive/balance training, pelvic and core stabilization; Pt. education for posture, body mechan

## 2018-01-28 DIAGNOSIS — F41.1 GAD (GENERALIZED ANXIETY DISORDER): ICD-10-CM

## 2018-01-29 ENCOUNTER — OFFICE VISIT (OUTPATIENT)
Dept: PHYSICAL THERAPY | Age: 43
End: 2018-01-29
Attending: ORTHOPAEDIC SURGERY
Payer: COMMERCIAL

## 2018-01-29 PROCEDURE — 97110 THERAPEUTIC EXERCISES: CPT | Performed by: PHYSICAL THERAPIST

## 2018-01-29 PROCEDURE — 97140 MANUAL THERAPY 1/> REGIONS: CPT | Performed by: PHYSICAL THERAPIST

## 2018-01-29 PROCEDURE — 97112 NEUROMUSCULAR REEDUCATION: CPT | Performed by: PHYSICAL THERAPIST

## 2018-01-29 RX ORDER — DEXTROAMPHETAMINE SACCHARATE, AMPHETAMINE ASPARTATE MONOHYDRATE, DEXTROAMPHETAMINE SULFATE AND AMPHETAMINE SULFATE 5; 5; 5; 5 MG/1; MG/1; MG/1; MG/1
20 CAPSULE, EXTENDED RELEASE ORAL DAILY
Qty: 30 CAPSULE | Refills: 0 | Status: SHIPPED | OUTPATIENT
Start: 2018-03-30 | End: 2018-04-29

## 2018-01-29 RX ORDER — DEXTROAMPHETAMINE SACCHARATE, AMPHETAMINE ASPARTATE MONOHYDRATE, DEXTROAMPHETAMINE SULFATE AND AMPHETAMINE SULFATE 5; 5; 5; 5 MG/1; MG/1; MG/1; MG/1
20 CAPSULE, EXTENDED RELEASE ORAL DAILY
Qty: 30 CAPSULE | Refills: 0 | Status: SHIPPED | OUTPATIENT
Start: 2018-02-28 | End: 2018-03-30

## 2018-01-29 RX ORDER — DEXTROAMPHETAMINE SACCHARATE, AMPHETAMINE ASPARTATE MONOHYDRATE, DEXTROAMPHETAMINE SULFATE AND AMPHETAMINE SULFATE 5; 5; 5; 5 MG/1; MG/1; MG/1; MG/1
20 CAPSULE, EXTENDED RELEASE ORAL DAILY
Qty: 30 CAPSULE | Refills: 0 | Status: SHIPPED | OUTPATIENT
Start: 2018-01-29 | End: 2018-02-28

## 2018-01-29 RX ORDER — DEXTROAMPHETAMINE SACCHARATE, AMPHETAMINE ASPARTATE, DEXTROAMPHETAMINE SULFATE AND AMPHETAMINE SULFATE 2.5; 2.5; 2.5; 2.5 MG/1; MG/1; MG/1; MG/1
10 TABLET ORAL DAILY
Qty: 30 TABLET | Refills: 0 | Status: SHIPPED | OUTPATIENT
Start: 2018-02-28 | End: 2018-03-30

## 2018-01-29 RX ORDER — BUPROPION HYDROCHLORIDE 150 MG/1
150 TABLET, EXTENDED RELEASE ORAL 2 TIMES DAILY
Qty: 60 TABLET | Refills: 2 | Status: SHIPPED | OUTPATIENT
Start: 2018-01-29 | End: 2019-03-22

## 2018-01-29 RX ORDER — DEXTROAMPHETAMINE SACCHARATE, AMPHETAMINE ASPARTATE, DEXTROAMPHETAMINE SULFATE AND AMPHETAMINE SULFATE 2.5; 2.5; 2.5; 2.5 MG/1; MG/1; MG/1; MG/1
10 TABLET ORAL DAILY
Qty: 30 TABLET | Refills: 0 | Status: SHIPPED | OUTPATIENT
Start: 2018-01-29 | End: 2018-02-28

## 2018-01-29 RX ORDER — DEXTROAMPHETAMINE SACCHARATE, AMPHETAMINE ASPARTATE, DEXTROAMPHETAMINE SULFATE AND AMPHETAMINE SULFATE 2.5; 2.5; 2.5; 2.5 MG/1; MG/1; MG/1; MG/1
10 TABLET ORAL DAILY
Qty: 30 TABLET | Refills: 0 | Status: SHIPPED | OUTPATIENT
Start: 2018-03-30 | End: 2018-04-29

## 2018-01-29 NOTE — TELEPHONE ENCOUNTER
From: Wandy Meyer  Sent: 1/28/2018 8:35 AM CST  Subject: Medication Renewal Request    Wandy Meyer would like a refill of the following medications:     BUPROPION HCL ER, SR, 150 MG Oral Tablet 12 Hr Akin Benson MD]   Patient Comment: I need refill for adderall xr 20mg and adderall 10mg Thank you    Preferred pharmacy: [facility] [address] [place] [address], [address] [place] [address], [phone], [phone]

## 2018-01-29 NOTE — TELEPHONE ENCOUNTER
Bupropion LF: 12/26/17  Adderall both LF: 10/9/17 for 3 months  LOV: 10/9/17    Please approve or deny pending Rx. Thank you!

## 2018-01-29 NOTE — PROGRESS NOTES
Referring Physician: Dr. Jessica Bryant    Progress note:     Dx:  Tear of peroneal tendon, left, initial encounter (F90.628X)          Authorized # of Visits:  8         Next MD visit: none scheduled  Fall Risk: standard         Precautions: n/a           Osie Markie 1/8/2018 TX#: 2/8  Date: 1/10/2018               TX#: 3/   Date:   1/17/2018            TX#: 4/ Date: 1/22/2018             TX#: 5/ Date: 1/24/2018            TX#: 6/ Date: 1/29/2018                 TX#: 7/ Date:               TX#: 8/   Nustep x 8', L-2 x

## 2018-02-14 ENCOUNTER — OFFICE VISIT (OUTPATIENT)
Dept: PHYSICAL THERAPY | Age: 43
End: 2018-02-14
Attending: ORTHOPAEDIC SURGERY
Payer: COMMERCIAL

## 2018-02-14 PROCEDURE — 97110 THERAPEUTIC EXERCISES: CPT | Performed by: PHYSICAL THERAPIST

## 2018-02-14 PROCEDURE — 97112 NEUROMUSCULAR REEDUCATION: CPT | Performed by: PHYSICAL THERAPIST

## 2018-02-14 NOTE — PROGRESS NOTES
Referring Physician: Dr. Walt Hussein     Discharge Summary    Pt has attended 8, cancelled 0, and no shown 0 visits in Physical Therapy.      Dx: Tear of peroneal tendon, left, initial encounter (I66.058A)          Authorized # of Visits:  8         Next MD visit this letter via fax as soon as possible to [phone]. If you have any questions, please contact me at Dept: [phone]. Sincerely,  Electronically signed by therapist: Diana Casey, PT, DPT, MTC, OCS. [de-identified] certification required:  Yes

## 2018-02-21 ENCOUNTER — APPOINTMENT (OUTPATIENT)
Dept: PHYSICAL THERAPY | Age: 43
End: 2018-02-21
Attending: ORTHOPAEDIC SURGERY
Payer: COMMERCIAL

## 2018-02-28 ENCOUNTER — APPOINTMENT (OUTPATIENT)
Dept: PHYSICAL THERAPY | Age: 43
End: 2018-02-28
Attending: ORTHOPAEDIC SURGERY
Payer: COMMERCIAL

## 2018-03-10 ENCOUNTER — HOSPITAL ENCOUNTER (OUTPATIENT)
Age: 43
Discharge: HOME | End: 2018-03-10
Payer: COMMERCIAL

## 2018-03-10 VITALS
TEMPERATURE: 98 F | SYSTOLIC BLOOD PRESSURE: 122 MMHG | HEART RATE: 73 BPM | RESPIRATION RATE: 18 BRPM | OXYGEN SATURATION: 96 % | BODY MASS INDEX: 39.51 KG/M2 | DIASTOLIC BLOOD PRESSURE: 88 MMHG | WEIGHT: 276 LBS | HEIGHT: 70 IN

## 2018-03-10 DIAGNOSIS — M79.671 INFLAMMATORY HEEL PAIN, RIGHT: ICD-10-CM

## 2018-03-10 DIAGNOSIS — M72.2 PLANTAR FASCIITIS, RIGHT: Primary | ICD-10-CM

## 2018-03-10 PROCEDURE — 99214 OFFICE O/P EST MOD 30 MIN: CPT

## 2018-03-10 PROCEDURE — 96372 THER/PROPH/DIAG INJ SC/IM: CPT

## 2018-03-10 RX ORDER — KETOROLAC TROMETHAMINE 30 MG/ML
60 INJECTION, SOLUTION INTRAMUSCULAR; INTRAVENOUS ONCE
Status: COMPLETED | OUTPATIENT
Start: 2018-03-10 | End: 2018-03-10

## 2018-03-10 RX ORDER — PREDNISONE 20 MG/1
40 TABLET ORAL DAILY
Qty: 8 TABLET | Refills: 0 | Status: SHIPPED | OUTPATIENT
Start: 2018-03-10 | End: 2018-03-14

## 2018-03-10 RX ORDER — NAPROXEN 500 MG/1
500 TABLET ORAL 2 TIMES DAILY PRN
Qty: 20 TABLET | Refills: 0 | Status: SHIPPED | OUTPATIENT
Start: 2018-03-10 | End: 2018-03-17

## 2018-03-10 RX ORDER — DEXAMETHASONE SODIUM PHOSPHATE 4 MG/ML
10 VIAL (ML) INJECTION ONCE
Status: COMPLETED | OUTPATIENT
Start: 2018-03-10 | End: 2018-03-10

## 2018-03-10 NOTE — ED PROVIDER NOTES
Patient Seen in: [facility]    History   Patient presents with:  Lower Extremity Injury (musculoskeletal): right heel and achilles pain since yesterday     Stated Complaint: Right heel pain    HPI    34-year-old male here with c noncontributory to the presenting problem, except as indicated as above. Smoking status: Never Smoker                                                              Smokeless tobacco: Never Used                      Alcohol use:  Yes               Review of Impression: Right heel inflammation in tandem with plantar fasciitis. Course of Treatment: Take naproxen twice daily with food. Take the prednisone as prescribed. Add some gel inserts to your shoes.   Please make a follow-up point with podiatry for brigitte

## 2018-03-10 NOTE — ED INITIAL ASSESSMENT (HPI)
Complains of right heel and achilles pain . Patient states that this started last night . Denies any injury, CMS adequate.

## 2018-03-13 ENCOUNTER — TELEPHONE (OUTPATIENT)
Dept: URGENT CARE | Age: 43
End: 2018-03-13

## 2018-03-13 NOTE — ED NOTES
Called asking to change his work note to reflect he can go back to work tomorrow Wednesday instead of Tuesday. Discussed with Dr Eulalia Davidson and Simeon Alexandre.  Work note changed per request.

## 2018-04-05 ENCOUNTER — MED REC SCAN ONLY (OUTPATIENT)
Dept: FAMILY MEDICINE CLINIC | Facility: CLINIC | Age: 43
End: 2018-04-05

## 2018-05-08 ENCOUNTER — HOSPITAL ENCOUNTER (OUTPATIENT)
Age: 43
Discharge: HOME | End: 2018-05-08
Attending: FAMILY MEDICINE
Payer: COMMERCIAL

## 2018-05-08 VITALS
HEART RATE: 80 BPM | SYSTOLIC BLOOD PRESSURE: 133 MMHG | TEMPERATURE: 99 F | DIASTOLIC BLOOD PRESSURE: 73 MMHG | OXYGEN SATURATION: 96 % | RESPIRATION RATE: 18 BRPM

## 2018-05-08 DIAGNOSIS — M54.50 BACK PAIN OF THORACOLUMBAR REGION: ICD-10-CM

## 2018-05-08 DIAGNOSIS — M54.6 BACK PAIN OF THORACOLUMBAR REGION: ICD-10-CM

## 2018-05-08 DIAGNOSIS — M62.830 LUMBAR PARASPINAL MUSCLE SPASM: Primary | ICD-10-CM

## 2018-05-08 PROCEDURE — 99213 OFFICE O/P EST LOW 20 MIN: CPT

## 2018-05-08 PROCEDURE — 99214 OFFICE O/P EST MOD 30 MIN: CPT

## 2018-05-08 RX ORDER — NAPROXEN 500 MG/1
500 TABLET ORAL 2 TIMES DAILY PRN
Qty: 20 TABLET | Refills: 0 | Status: SHIPPED | OUTPATIENT
Start: 2018-05-08 | End: 2018-05-15

## 2018-05-08 RX ORDER — CYCLOBENZAPRINE HCL 10 MG
10 TABLET ORAL 3 TIMES DAILY PRN
Qty: 9 TABLET | Refills: 0 | Status: SHIPPED | OUTPATIENT
Start: 2018-05-08 | End: 2018-05-11

## 2018-05-08 NOTE — ED PROVIDER NOTES
Patient Seen in: [facility]    History   Patient presents with:  Back Pain (musculoskeletal)    Stated Complaint: Lower pain 5 days    HPI  25-year-old gentleman coming in with complaints of lower back pain worse on the right th Vitals [05/08/18 1010]  BP: 133/73  Pulse: 80  Resp: 18  Temp: 98.5 °F (36.9 °C)  Temp src: n/a  SpO2: 96 %  O2 Device: None (Room air)    Current:/73   Pulse 80   Temp 98.5 °F (36.9 °C)   Resp 18   SpO2 96%         Physical Exam    General: Well-nou should not be used if driving or operating heavy machinery. Take only at bedtime if you are busy in the morning.    Rx Anti-inflammatory as prescribed - take with food / milk for anti-inflammatory properties and avoid / STOP if GI distress occurs (do not ta

## 2018-05-08 NOTE — ED INITIAL ASSESSMENT (HPI)
Presents with complaints of right lower back pain onset Friday. Denies any known injury. Patient denies any urinary symptoms or fever states that pain does radiate intermittently to the right buttocks and down the right leg.

## 2018-05-30 DIAGNOSIS — N52.9 ERECTILE DYSFUNCTION, UNSPECIFIED ERECTILE DYSFUNCTION TYPE: ICD-10-CM

## 2018-05-30 RX ORDER — TADALAFIL 20 MG/1
20 TABLET ORAL AS NEEDED
Qty: 24 TABLET | Refills: 0 | Status: SHIPPED
Start: 2018-05-30 | End: 2019-03-26

## 2018-05-30 NOTE — TELEPHONE ENCOUNTER
From: Savana Dyson  Sent: 5/30/2018 7:25 AM CDT  Subject: Medication Renewal Request    Savana Dyson would like a refill of the following medications:      Tadalafil (CIALIS) 20 MG Oral Tab Jose Enrique Elkins MD]    Preferred pharmacy: [facility]

## 2018-06-01 NOTE — PROGRESS NOTES
Chief Complaint:   Patient presents with:  Back Pain: Symptoms started on Wednesday     HPI:   This is a 36year old male presenting for with worsening lumbar pain-worse with over use, history of lumbar spasm and degenerative disc disease.    Patient report ER (ADDERALL XR) 20 MG Oral Capsule SR 24 Hr Take 1 capsule (20 mg total) by mouth daily. Disp: 30 capsule Rfl: 0   [START ON 7/1/2018] Amphetamine-Dextroamphet ER (ADDERALL XR) 20 MG Oral Capsule SR 24 Hr Take 1 capsule (20 mg total) by mouth daily.  Disp: Eyes: Negative for photophobia, pain, discharge, redness, itching and visual disturbance. Respiratory: Negative for cough, chest tightness and shortness of breath. Cardiovascular: Negative for chest pain, palpitations and leg swelling.    Gastrointes Neck: Normal range of motion. Neck supple. No JVD present. No tracheal deviation present. No thyromegaly present. Cardiovascular: Normal rate, regular rhythm, normal heart sounds and intact distal pulses. No murmur heard. Edema not present.   Pulmo XR) 20 MG Oral Capsule SR 24 Hr; Take 1 capsule (20 mg total) by mouth daily. Dispense: 30 capsule; Refill: 0  - Amphetamine-Dextroamphet ER (ADDERALL XR) 20 MG Oral Capsule SR 24 Hr; Take 1 capsule (20 mg total) by mouth daily. Dispense: 30 capsule;  Ref

## 2019-03-22 PROBLEM — E66.01 SEVERE OBESITY (BMI >= 40) (HCC): Status: ACTIVE | Noted: 2019-03-22

## 2019-03-22 PROBLEM — F41.1 GAD (GENERALIZED ANXIETY DISORDER): Status: ACTIVE | Noted: 2019-03-22

## 2019-03-22 NOTE — PROGRESS NOTES
Chief Complaint:   Patient presents with:  Medication Follow-Up    HPI:   This is a 39year old male presenting for follow up. Patient returns for recheck of ADHD treatment. Has been using the stimulant medication on a regular basis.   Feels the [employer] Known Allergies  Current Meds:    Current Outpatient Medications:  buPROPion HCl ER, SR, 150 MG Oral Tablet 12 Hr Take 1 tablet (150 mg total) by mouth 2 (two) times daily.  Disp: 60 tablet Rfl: 2   Amphetamine-Dextroamphet ER (ADDERALL XR) 20 MG Oral Capsu Skin: Negative for color change, pallor, rash and wound. Allergic/Immunologic: Negative for environmental allergies, food allergies and immunocompromised state. Neurological: Negative for dizziness, weakness, light-headedness and headaches.    Hematol oriented to person, place, and time. No cranial nerve deficit or motor deficit. Gait normal.   Skin: Skin is warm and dry. No lesion and no rash noted. He is not diaphoretic. Psychiatric: He has a normal mood and affect.  His behavior is normal. Judgment

## 2019-03-23 ENCOUNTER — LABORATORY ENCOUNTER (OUTPATIENT)
Dept: LAB | Age: 44
End: 2019-03-23
Attending: FAMILY MEDICINE
Payer: COMMERCIAL

## 2019-03-23 DIAGNOSIS — Z13.228 SCREENING FOR ENDOCRINE, NUTRITIONAL, METABOLIC AND IMMUNITY DISORDER: ICD-10-CM

## 2019-03-23 DIAGNOSIS — Z13.0 SCREENING FOR ENDOCRINE, NUTRITIONAL, METABOLIC AND IMMUNITY DISORDER: ICD-10-CM

## 2019-03-23 DIAGNOSIS — Z13.29 SCREENING FOR ENDOCRINE, NUTRITIONAL, METABOLIC AND IMMUNITY DISORDER: ICD-10-CM

## 2019-03-23 DIAGNOSIS — Z13.21 SCREENING FOR ENDOCRINE, NUTRITIONAL, METABOLIC AND IMMUNITY DISORDER: ICD-10-CM

## 2019-03-23 LAB
ALBUMIN SERPL-MCNC: 3.8 G/DL (ref 3.4–5)
ALBUMIN/GLOB SERPL: 1.2 {RATIO} (ref 1–2)
ALP LIVER SERPL-CCNC: 57 U/L (ref 45–117)
ALT SERPL-CCNC: 79 U/L (ref 16–61)
ANION GAP SERPL CALC-SCNC: 7 MMOL/L (ref 0–18)
AST SERPL-CCNC: 31 U/L (ref 15–37)
BASOPHILS # BLD AUTO: 0.05 X10(3) UL (ref 0–0.2)
BASOPHILS NFR BLD AUTO: 0.5 %
BILIRUB SERPL-MCNC: 1 MG/DL (ref 0.1–2)
BUN BLD-MCNC: 17 MG/DL (ref 7–18)
BUN/CREAT SERPL: 17 (ref 10–20)
CALCIUM BLD-MCNC: 8.9 MG/DL (ref 8.5–10.1)
CHLORIDE SERPL-SCNC: 109 MMOL/L (ref 98–107)
CHOLEST SMN-MCNC: 167 MG/DL (ref ?–200)
CO2 SERPL-SCNC: 24 MMOL/L (ref 21–32)
CREAT BLD-MCNC: 1 MG/DL (ref 0.7–1.3)
DEPRECATED RDW RBC AUTO: 40.7 FL (ref 35.1–46.3)
EOSINOPHIL # BLD AUTO: 0.3 X10(3) UL (ref 0–0.7)
EOSINOPHIL NFR BLD AUTO: 3.3 %
ERYTHROCYTE [DISTWIDTH] IN BLOOD BY AUTOMATED COUNT: 12.5 % (ref 11–15)
GLOBULIN PLAS-MCNC: 3.3 G/DL (ref 2.8–4.4)
GLUCOSE BLD-MCNC: 145 MG/DL (ref 70–99)
HCT VFR BLD AUTO: 44.3 % (ref 39–53)
HDLC SERPL-MCNC: 37 MG/DL (ref 40–59)
HGB BLD-MCNC: 14.6 G/DL (ref 13–17.5)
IMM GRANULOCYTES # BLD AUTO: 0.03 X10(3) UL (ref 0–1)
IMM GRANULOCYTES NFR BLD: 0.3 %
LDLC SERPL CALC-MCNC: 89 MG/DL (ref ?–100)
LYMPHOCYTES # BLD AUTO: 3.55 X10(3) UL (ref 1–4)
LYMPHOCYTES NFR BLD AUTO: 38.7 %
M PROTEIN MFR SERPL ELPH: 7.1 G/DL (ref 6.4–8.2)
MCH RBC QN AUTO: 29.9 PG (ref 26–34)
MCHC RBC AUTO-ENTMCNC: 33 G/DL (ref 31–37)
MCV RBC AUTO: 90.8 FL (ref 80–100)
MONOCYTES # BLD AUTO: 0.79 X10(3) UL (ref 0.1–1)
MONOCYTES NFR BLD AUTO: 8.6 %
NEUTROPHILS # BLD AUTO: 4.46 X10 (3) UL (ref 1.5–7.7)
NEUTROPHILS # BLD AUTO: 4.46 X10(3) UL (ref 1.5–7.7)
NEUTROPHILS NFR BLD AUTO: 48.6 %
NONHDLC SERPL-MCNC: 130 MG/DL (ref ?–130)
OSMOLALITY SERPL CALC.SUM OF ELEC: 294 MOSM/KG (ref 275–295)
PLATELET # BLD AUTO: 230 10(3)UL (ref 150–450)
POTASSIUM SERPL-SCNC: 4.3 MMOL/L (ref 3.5–5.1)
RBC # BLD AUTO: 4.88 X10(6)UL (ref 4.3–5.7)
SODIUM SERPL-SCNC: 140 MMOL/L (ref 136–145)
TRIGL SERPL-MCNC: 205 MG/DL (ref 30–149)
TSI SER-ACNC: 2.09 MIU/ML (ref 0.36–3.74)
VLDLC SERPL CALC-MCNC: 41 MG/DL (ref 0–30)
WBC # BLD AUTO: 9.2 X10(3) UL (ref 4–11)

## 2019-03-23 PROCEDURE — 80053 COMPREHEN METABOLIC PANEL: CPT

## 2019-03-23 PROCEDURE — 36415 COLL VENOUS BLD VENIPUNCTURE: CPT

## 2019-03-23 PROCEDURE — 85025 COMPLETE CBC W/AUTO DIFF WBC: CPT

## 2019-03-23 PROCEDURE — 84443 ASSAY THYROID STIM HORMONE: CPT

## 2019-03-23 PROCEDURE — 80061 LIPID PANEL: CPT

## 2019-03-26 DIAGNOSIS — N52.9 ERECTILE DYSFUNCTION, UNSPECIFIED ERECTILE DYSFUNCTION TYPE: ICD-10-CM

## 2019-03-26 RX ORDER — TADALAFIL 20 MG/1
20 TABLET ORAL AS NEEDED
Qty: 24 TABLET | Refills: 0 | Status: SHIPPED | OUTPATIENT
Start: 2019-03-26 | End: 2019-10-21

## 2019-03-27 ENCOUNTER — TELEPHONE (OUTPATIENT)
Dept: FAMILY MEDICINE CLINIC | Facility: CLINIC | Age: 44
End: 2019-03-27

## 2019-03-27 ENCOUNTER — OFFICE VISIT (OUTPATIENT)
Dept: FAMILY MEDICINE CLINIC | Facility: CLINIC | Age: 44
End: 2019-03-27

## 2019-03-27 VITALS
HEART RATE: 85 BPM | OXYGEN SATURATION: 98 % | BODY MASS INDEX: 40.23 KG/M2 | RESPIRATION RATE: 18 BRPM | HEIGHT: 72 IN | DIASTOLIC BLOOD PRESSURE: 92 MMHG | SYSTOLIC BLOOD PRESSURE: 130 MMHG | WEIGHT: 297 LBS

## 2019-03-27 DIAGNOSIS — R73.01 IMPAIRED FASTING GLUCOSE: ICD-10-CM

## 2019-03-27 DIAGNOSIS — E78.00 ELEVATED CHOLESTEROL: Primary | ICD-10-CM

## 2019-03-27 DIAGNOSIS — L91.8 MULTIPLE ACQUIRED SKIN TAGS: Primary | ICD-10-CM

## 2019-03-27 PROCEDURE — 11200 RMVL SKIN TAGS UP TO&INC 15: CPT | Performed by: NURSE PRACTITIONER

## 2019-03-27 PROCEDURE — 99213 OFFICE O/P EST LOW 20 MIN: CPT | Performed by: NURSE PRACTITIONER

## 2019-03-27 RX ORDER — GINSENG 100 MG
1 CAPSULE ORAL 2 TIMES DAILY
Qty: 1 TUBE | Refills: 0 | Status: SHIPPED | OUTPATIENT
Start: 2019-03-27 | End: 2019-09-17

## 2019-03-27 NOTE — PATIENT INSTRUCTIONS
Bandage Change  If the bandage becomes wet or dirty, replace it. Otherwise, leave it in place for the first 24 hours. Then once a day:  · Remove the bandage and wash the area with soap and water.  Use a wet cotton swab to loosen and remove any blood or cr

## 2019-03-27 NOTE — PROGRESS NOTES
Chief Complaint:   Patient presents with:  Skin    HPI:   This is a 39year old male presenting for skin tag removal. He has 1 skin tag on his right neck, 1 under the right arm, 1 on the right upper back, and 4 smaller skin tags in the left axillary area. (ADDERALL XR) 20 MG Oral Capsule SR 24 Hr Take 1 capsule (20 mg total) by mouth daily. Disp: 30 capsule Rfl: 0   Amphetamine-Dextroamphetamine (ADDERALL OR) Take by mouth.  Disp:  Rfl:    albuterol sulfate (2.5 MG/3ML) 0.083% Inhalation Nebu Soln Take 3 mL respiratory distress. He has no wheezes. He has no rales. Abdominal: Soft. Bowel sounds are normal. He exhibits no distension. There is no tenderness. Musculoskeletal: Normal range of motion. He exhibits no tenderness.    Lymphadenopathy:     He has no

## 2019-03-27 NOTE — PROCEDURES
Procedure Note for Skin Tag Excision   Location: Left axilla, right neck, right underarm, right upper back   Discussed with patient the risks and benefits and risk of bleeding or worsening infection and the patient gave verbal consent to continue with proc

## 2019-03-27 NOTE — TELEPHONE ENCOUNTER
----- Message from Cait Mane MD sent at 3/27/2019  3:31 PM CDT -----  Low fat low chol, low carb-recheck CMP and lipid in 6 months, add A1C to next lab draw.

## 2019-04-04 NOTE — TELEPHONE ENCOUNTER
3rd attempted:    Called patient and spoke with him. Patient states that he is currently at another appointment and cannot talk right now. Advised patient to contact the office back to go over lab results. Patient states understanding.

## 2019-09-19 PROBLEM — F33.1 MODERATE EPISODE OF RECURRENT MAJOR DEPRESSIVE DISORDER (HCC): Status: ACTIVE | Noted: 2019-09-19

## 2019-09-19 NOTE — PROGRESS NOTES
Chief Complaint:   Patient presents with:  Medication Follow-Up    HPI:   This is a 38year old male presenting for follow up. Patient returns for recheck of ADHD treatment. Has been using the stimulant medication on a regular basis.   Feels the [employer] INJECTION Bilateral 10/26/2015    Performed by Ariel Ceja MD at [address]   • OTHER Left 09/2017    work injury , left wrist    • OTHER SURGICAL HISTORY      left wrist      Social History:  Social History    Tobacco Use      Smok nebulization every 6 (six) hours as needed for Wheezing. Disp: 25 vial Rfl: 0      Counseling given: Not Answered       REVIEW OF SYSTEMS:   Review of Systems   Constitutional: Negative for chills, diaphoresis, fatigue and fever.    HENT: Negative for conge oriented to person, place, and time. He appears well-developed and well-nourished. No distress. HENT:   Head: Normocephalic and atraumatic. Nose: Nose normal.   Mouth/Throat: Oropharynx is clear and moist. No oropharyngeal exudate or pharynx erythema. total) by mouth daily. Dispense: 30 capsule; Refill: 0  - Amphetamine-Dextroamphet ER (ADDERALL XR) 20 MG Oral Capsule SR 24 Hr; Take 1 capsule (20 mg total) by mouth daily. Dispense: 30 capsule;  Refill: 0  - Amphetamine-Dextroamphet ER (ADDERALL XR) 20

## 2019-09-23 ENCOUNTER — PATIENT MESSAGE (OUTPATIENT)
Dept: FAMILY MEDICINE CLINIC | Facility: CLINIC | Age: 44
End: 2019-09-23

## 2019-09-23 NOTE — TELEPHONE ENCOUNTER
From: Areli Rodriguez  To:  Callie Zimmerman MD  Sent: 9/23/2019 7:16 AM CDT  Subject: Referral Request    I'm looking for a referral for psychiatry and if you know someone, thank you  Sincerely   Areli Rodriguez

## 2019-09-23 NOTE — TELEPHONE ENCOUNTER
Patient is requesting a referral to psychiatry. LOV: 9/17/19 for depression, anxiety and ADHD. Okay for romi hemphill? Please advise. Thank you!

## 2019-10-20 ENCOUNTER — PATIENT MESSAGE (OUTPATIENT)
Dept: FAMILY MEDICINE CLINIC | Facility: CLINIC | Age: 44
End: 2019-10-20

## 2019-10-20 DIAGNOSIS — F41.1 GAD (GENERALIZED ANXIETY DISORDER): ICD-10-CM

## 2019-10-20 DIAGNOSIS — Z30.09 VASECTOMY EVALUATION: Primary | ICD-10-CM

## 2019-10-20 DIAGNOSIS — F98.8 ATTENTION DEFICIT DISORDER, UNSPECIFIED HYPERACTIVITY PRESENCE: ICD-10-CM

## 2019-10-21 DIAGNOSIS — N52.9 ERECTILE DYSFUNCTION, UNSPECIFIED ERECTILE DYSFUNCTION TYPE: ICD-10-CM

## 2019-10-21 RX ORDER — DEXTROAMPHETAMINE SACCHARATE, AMPHETAMINE ASPARTATE MONOHYDRATE, DEXTROAMPHETAMINE SULFATE AND AMPHETAMINE SULFATE 5; 5; 5; 5 MG/1; MG/1; MG/1; MG/1
20 CAPSULE, EXTENDED RELEASE ORAL DAILY
Qty: 30 CAPSULE | Refills: 0 | OUTPATIENT
Start: 2019-10-21 | End: 2019-11-20

## 2019-10-21 RX ORDER — DEXTROAMPHETAMINE SACCHARATE, AMPHETAMINE ASPARTATE, DEXTROAMPHETAMINE SULFATE AND AMPHETAMINE SULFATE 2.5; 2.5; 2.5; 2.5 MG/1; MG/1; MG/1; MG/1
10 TABLET ORAL DAILY
Qty: 30 TABLET | Refills: 0 | OUTPATIENT
Start: 2019-10-21 | End: 2019-11-20

## 2019-10-21 RX ORDER — TADALAFIL 20 MG/1
20 TABLET ORAL AS NEEDED
Qty: 24 TABLET | Refills: 0 | Status: SHIPPED | OUTPATIENT
Start: 2019-10-21 | End: 2020-08-26

## 2019-10-21 RX ORDER — BUPROPION HYDROCHLORIDE 150 MG/1
150 TABLET ORAL 2 TIMES DAILY
Qty: 60 TABLET | Refills: 3 | OUTPATIENT
Start: 2019-10-21

## 2019-10-21 NOTE — TELEPHONE ENCOUNTER
Medication(s) to Refill:   Requested Prescriptions     Pending Prescriptions Disp Refills   • Tadalafil (CIALIS) 20 MG Oral Tab 24 tablet 0     Sig: Take 1 tablet (20 mg total) by mouth as needed for Erectile Dysfunction.          Reason for Medication Refi Readings from Last 2 Encounters:  09/17/19 : 138/90  03/27/19 : (!) 130/92

## 2019-10-21 NOTE — TELEPHONE ENCOUNTER
Patient is requesting a referral.  However, no insurance is loaded into Epic. Can you please load insurance so referral can be processed. Thank you!

## 2019-10-21 NOTE — TELEPHONE ENCOUNTER
From: Yvette Ruiz  To:  Rell Cooley MD  Sent: 10/20/2019 9:47 PM CDT  Subject: Referral Request    Hello I need a referral for a vasectomy, thank you

## 2020-01-03 ENCOUNTER — HOSPITAL ENCOUNTER (OUTPATIENT)
Age: 45
Discharge: HOME | End: 2020-01-03
Attending: FAMILY MEDICINE
Payer: COMMERCIAL

## 2020-01-03 ENCOUNTER — APPOINTMENT (OUTPATIENT)
Dept: GENERAL RADIOLOGY | Age: 45
End: 2020-01-03
Attending: FAMILY MEDICINE
Payer: COMMERCIAL

## 2020-01-03 VITALS
TEMPERATURE: 98 F | HEART RATE: 74 BPM | DIASTOLIC BLOOD PRESSURE: 84 MMHG | SYSTOLIC BLOOD PRESSURE: 147 MMHG | OXYGEN SATURATION: 98 % | RESPIRATION RATE: 18 BRPM

## 2020-01-03 DIAGNOSIS — S83.411A SPRAIN OF MEDIAL COLLATERAL LIGAMENT OF RIGHT KNEE, INITIAL ENCOUNTER: Primary | ICD-10-CM

## 2020-01-03 PROCEDURE — 99214 OFFICE O/P EST MOD 30 MIN: CPT

## 2020-01-03 PROCEDURE — 99213 OFFICE O/P EST LOW 20 MIN: CPT

## 2020-01-03 RX ORDER — NAPROXEN 500 MG/1
500 TABLET ORAL 2 TIMES DAILY PRN
Qty: 20 TABLET | Refills: 0 | Status: SHIPPED | OUTPATIENT
Start: 2020-01-03 | End: 2020-01-17

## 2020-01-03 NOTE — ED PROVIDER NOTES
Patient presents with:  Knee Pain      HPI:     Leyla Rosas is a 38year old male who presents today with a chief complaint of pain in the right knee, medial , medial pain after twisting injury. Onset of symptoms was gradual starting 1 week ago.  The 1 WK      naproxen 500 MG Oral Tab          Sig: Take 1 tablet (500 mg total) by mouth 2 (two) times daily as needed.           Dispense:  20 tablet          Refill:  0      Labs performed this visit:  No results found for this or any previous visit (from t

## 2020-01-13 ENCOUNTER — PATIENT MESSAGE (OUTPATIENT)
Dept: FAMILY MEDICINE CLINIC | Facility: CLINIC | Age: 45
End: 2020-01-13

## 2020-01-13 DIAGNOSIS — M25.561 CHRONIC PAIN OF RIGHT KNEE: Primary | ICD-10-CM

## 2020-01-13 DIAGNOSIS — G89.29 CHRONIC PAIN OF RIGHT KNEE: Primary | ICD-10-CM

## 2020-01-13 NOTE — TELEPHONE ENCOUNTER
From: Ivet Garg  To: Misbah Roberts MD  Sent: 1/13/2020 1:51 PM CST  Subject: Referral Request    Hello, Friday 3rd of January [facility] problems in my right knee.  I believe I have a referral to see an orthopedic but I lost the pa

## 2020-01-13 NOTE — TELEPHONE ENCOUNTER
Patient was seen at [address] [place] [address] on 1/3/20 for knee sprain. Patient is no better and is asking for a referral to Dr. Sukhwinder Minaya. Please advise. Thank you!

## 2020-01-14 ENCOUNTER — TELEPHONE (OUTPATIENT)
Dept: FAMILY MEDICINE CLINIC | Facility: CLINIC | Age: 45
End: 2020-01-14

## 2020-01-14 NOTE — TELEPHONE ENCOUNTER
Fax received from [facility] that patient's Tadalafil requires PA. PA completed on Cover My Meds. PA is pending review. Will boy for follow-up.

## 2020-01-15 ENCOUNTER — PATIENT MESSAGE (OUTPATIENT)
Dept: FAMILY MEDICINE CLINIC | Facility: CLINIC | Age: 45
End: 2020-01-15

## 2020-01-15 DIAGNOSIS — G89.29 CHRONIC PAIN OF RIGHT KNEE: Primary | ICD-10-CM

## 2020-01-15 DIAGNOSIS — M25.561 CHRONIC PAIN OF RIGHT KNEE: Primary | ICD-10-CM

## 2020-01-15 NOTE — TELEPHONE ENCOUNTER
Please see above message. Please advise on an alterative ortho referral for patient. Patient was previously referred to Dr. Smith Warren. Thank you!

## 2020-01-15 NOTE — TELEPHONE ENCOUNTER
From: Breana Cooper  To: Veronica Motta MD  Sent: 1/15/2020 1:02 PM CST  Subject: Referral Request    Hello again, the office of Dr. Princess Flynn they told I need see a doctor who specializes more into sprain and tear of ligament.  I ask them if I need a dif

## 2020-01-15 NOTE — TELEPHONE ENCOUNTER
Fax received from [address] that no PA is needed. Patient still has a valid PA on file until 3/27/20 for up to 8 tablets per month. Pharmacy notified of this information.

## 2020-01-16 ENCOUNTER — PATIENT MESSAGE (OUTPATIENT)
Dept: FAMILY MEDICINE CLINIC | Facility: CLINIC | Age: 45
End: 2020-01-16

## 2020-01-16 NOTE — TELEPHONE ENCOUNTER
It looks like Dr. Sury Wilson referred him to Dr. Vahe Martinez who is not in network with his insurance. Ok to send him to Dr. Robi Shay if Dr. Sury Wilson can't see him.

## 2020-01-16 NOTE — TELEPHONE ENCOUNTER
Referral for Dr. Vamshi Hawthorne placed in [address] [place] [address] Rd. Patient notified above.

## 2020-01-17 ENCOUNTER — HOSPITAL ENCOUNTER (OUTPATIENT)
Dept: GENERAL RADIOLOGY | Age: 45
Discharge: HOME | End: 2020-01-17
Attending: ORTHOPAEDIC SURGERY
Payer: COMMERCIAL

## 2020-01-17 DIAGNOSIS — M25.561 RIGHT KNEE PAIN, UNSPECIFIED CHRONICITY: ICD-10-CM

## 2020-01-17 PROCEDURE — 73564 X-RAY EXAM KNEE 4 OR MORE: CPT | Performed by: ORTHOPAEDIC SURGERY

## 2020-01-17 RX ORDER — NAPROXEN 500 MG/1
500 TABLET ORAL 2 TIMES DAILY PRN
Qty: 20 TABLET | Refills: 0 | Status: SHIPPED | OUTPATIENT
Start: 2020-01-17 | End: 2020-01-24

## 2020-01-17 NOTE — TELEPHONE ENCOUNTER
From: Sebastian Batista  To: Rita Mackey MD  Sent: 1/16/2020 4:14 PM CST  Subject: Other    Hello again and I'm very sorry to disturb you.  I don't know if Dr. Ismael Hughes could write another prescription of naproxen and if he could authorized a MRI for my kn

## 2020-01-24 ENCOUNTER — HOSPITAL ENCOUNTER (OUTPATIENT)
Dept: MRI IMAGING | Age: 45
Discharge: HOME | End: 2020-01-24
Attending: PHYSICIAN ASSISTANT
Payer: COMMERCIAL

## 2020-01-24 DIAGNOSIS — M23.91 INTERNAL DERANGEMENT OF RIGHT KNEE: ICD-10-CM

## 2020-01-24 PROCEDURE — 73721 MRI JNT OF LWR EXTRE W/O DYE: CPT | Performed by: PHYSICIAN ASSISTANT

## 2020-02-19 ENCOUNTER — OFFICE VISIT (OUTPATIENT)
Dept: PHYSICAL THERAPY | Age: 45
End: 2020-02-19
Attending: PHYSICIAN ASSISTANT
Payer: COMMERCIAL

## 2020-02-19 PROCEDURE — 97161 PT EVAL LOW COMPLEX 20 MIN: CPT

## 2020-02-19 PROCEDURE — 97110 THERAPEUTIC EXERCISES: CPT

## 2020-02-20 NOTE — PROGRESS NOTES
LOWER EXTREMITY EVALUATION:   Referring Physician: Mr. Pawan Lewis  Diagnosis: Right Knee MCL Sprain     Date of Service: 2/19/2020     PATIENT SUMMARY   Billy Soto is a 38year old y/o male who presents to therapy today with complaints of right hamstrings and rectus femoris. Significant restrictions of the bilateral calves for flexibility assessment. Strength/MMT: Full MMT of the bilateral LEs.       Special tests: Negative classical special tests    Functional Screen: Normal    Today’s Aranza any questions, please contact me at Dept: [phone]    Sincerely,  Electronically signed by therapist: Susy Ta.  Elle Leger, PT, DPT, MTC, FAAOMPT    Physician's certification required: Yes  I certify the need for these services furnished under this plan of tr

## 2020-02-24 ENCOUNTER — OFFICE VISIT (OUTPATIENT)
Dept: PHYSICAL THERAPY | Age: 45
End: 2020-02-24
Attending: PHYSICIAN ASSISTANT
Payer: COMMERCIAL

## 2020-02-24 PROCEDURE — 97110 THERAPEUTIC EXERCISES: CPT

## 2020-02-24 PROCEDURE — 97140 MANUAL THERAPY 1/> REGIONS: CPT

## 2020-02-24 NOTE — PROGRESS NOTES
Dx: Right Knee MCL Sprain         Authorized # of Visits:  8 (O)         Next MD visit: none scheduled  Fall Risk: standard         Precautions: n/a             Subjective: Feeling good. No increase in pain over the weekend.       Objective: Treatment in

## 2020-02-26 ENCOUNTER — OFFICE VISIT (OUTPATIENT)
Dept: PHYSICAL THERAPY | Age: 45
End: 2020-02-26
Attending: PHYSICIAN ASSISTANT
Payer: COMMERCIAL

## 2020-02-26 PROCEDURE — 97140 MANUAL THERAPY 1/> REGIONS: CPT

## 2020-02-26 PROCEDURE — 97110 THERAPEUTIC EXERCISES: CPT

## 2020-02-26 NOTE — PROGRESS NOTES
Dx: Right Knee MCL Sprain         Authorized # of Visits:  8 (O)         Next MD visit: none scheduled  Fall Risk: standard         Precautions: n/a             Subjective: Feeling good. No new complaints.       Objective: Treatment progressed per treatm 50 min

## 2020-03-02 ENCOUNTER — OFFICE VISIT (OUTPATIENT)
Dept: PHYSICAL THERAPY | Age: 45
End: 2020-03-02
Attending: PHYSICIAN ASSISTANT
Payer: COMMERCIAL

## 2020-03-02 PROCEDURE — 97140 MANUAL THERAPY 1/> REGIONS: CPT

## 2020-03-02 PROCEDURE — 97110 THERAPEUTIC EXERCISES: CPT

## 2020-03-02 NOTE — PROGRESS NOTES
Dx: Right Knee MCL Sprain         Authorized # of Visits:  8 (O)         Next MD visit: none scheduled  Fall Risk: standard         Precautions: n/a             Subjective: Feeling good. No new complaints.   States that he is walking around at work more Patient will have an increase in hip strength to assist with returning to exercise to tolerance. 5. Patient will demonstrate an increase in MLT of calves and hips in order to return to exercise. Plan: Assess response and progress as tolerated.

## 2020-03-04 ENCOUNTER — OFFICE VISIT (OUTPATIENT)
Dept: PHYSICAL THERAPY | Age: 45
End: 2020-03-04
Attending: PHYSICIAN ASSISTANT
Payer: COMMERCIAL

## 2020-03-04 PROCEDURE — 97110 THERAPEUTIC EXERCISES: CPT

## 2020-03-04 PROCEDURE — 97140 MANUAL THERAPY 1/> REGIONS: CPT

## 2020-03-04 NOTE — PROGRESS NOTES
Dx: Right Knee MCL Sprain         Authorized # of Visits:  8 (O)         Next MD visit: none scheduled  Fall Risk: standard         Precautions: n/a             Subjective: States he worked all day without the knee brace and did OK.   Stairs minimally pro Responded well without increased pain with treatment today. Minimal instability with some single leg stance activities. Goals (to be met in 8 visits):    1. Increase FOTO assessment > 11% from INE to DC.   2. Patient will be aware of postural limita

## 2020-03-09 ENCOUNTER — OFFICE VISIT (OUTPATIENT)
Dept: PHYSICAL THERAPY | Age: 45
End: 2020-03-09
Attending: PHYSICIAN ASSISTANT
Payer: COMMERCIAL

## 2020-03-09 PROCEDURE — 97110 THERAPEUTIC EXERCISES: CPT

## 2020-03-09 PROCEDURE — 97140 MANUAL THERAPY 1/> REGIONS: CPT

## 2020-03-09 NOTE — PROGRESS NOTES
Dx: Right Knee MCL Sprain         Authorized # of Visits:  8 (O)         Next MD visit: none scheduled  Fall Risk: standard         Precautions: n/a             Subjective: Is mostly done with the knee brace to this point.   Only uses it at work where he hip capsule stretch Posterior hip capsule stretch     Medial patellar and quadriceps glide  Medial patellar and quadriceps glide  Medial patellar and quadriceps glide Medial patellar and quadriceps glide Medial patellar and quadriceps glide      ITB Roll x

## 2020-03-11 ENCOUNTER — OFFICE VISIT (OUTPATIENT)
Dept: PHYSICAL THERAPY | Age: 45
End: 2020-03-11
Attending: PHYSICIAN ASSISTANT
Payer: COMMERCIAL

## 2020-03-11 PROCEDURE — 97110 THERAPEUTIC EXERCISES: CPT

## 2020-03-11 PROCEDURE — 97140 MANUAL THERAPY 1/> REGIONS: CPT

## 2020-03-16 ENCOUNTER — OFFICE VISIT (OUTPATIENT)
Dept: PHYSICAL THERAPY | Age: 45
End: 2020-03-16
Attending: PHYSICIAN ASSISTANT
Payer: COMMERCIAL

## 2020-03-16 PROCEDURE — 97110 THERAPEUTIC EXERCISES: CPT

## 2020-03-16 PROCEDURE — 97140 MANUAL THERAPY 1/> REGIONS: CPT

## 2020-03-16 NOTE — PROGRESS NOTES
Discharge Summary  Pt has attended 8 visits in Physical Therapy.    Dx: Right Knee MCL Sprain         Authorized # of Visits:  8 (O)         Next MD visit: none scheduled  Fall Risk: standard         Precautions: n/a             Subjective: States that t you have any questions, please contact me at Dept: [phone]. Sincerely,  Electronically signed by therapist: Meagan Bowser, PT            Charges: TherEx 2 (25 min);  Manual PT 2 (25 min)       Total Timed Treatment: 50 min  Total Treatment Time: 50 min

## 2020-03-23 ENCOUNTER — TELEPHONE (OUTPATIENT)
Dept: FAMILY MEDICINE CLINIC | Facility: CLINIC | Age: 45
End: 2020-03-23

## 2020-03-23 NOTE — TELEPHONE ENCOUNTER
Adderall was prescribed by Dr. Kunal Manriquez. Called patient and spoke with him. Advised him of this information. Patient instructed to contact his office for refill. Patient states understanding.

## 2020-03-23 NOTE — TELEPHONE ENCOUNTER
Last office visit:   09/17/2019 (if over 1 year, schedule appointment)        Requested medication: amphetamine-dextroamphetamine (ADDERALL) 10 MG Oral Tab     Pharmacy:[id number] [place], [place] - [address] AT [facility]

## 2020-04-06 ENCOUNTER — TELEPHONE (OUTPATIENT)
Dept: FAMILY MEDICINE CLINIC | Facility: CLINIC | Age: 45
End: 2020-04-06

## 2020-04-06 ENCOUNTER — VIRTUAL PHONE E/M (OUTPATIENT)
Dept: FAMILY MEDICINE CLINIC | Facility: CLINIC | Age: 45
End: 2020-04-06

## 2020-04-06 ENCOUNTER — PATIENT MESSAGE (OUTPATIENT)
Dept: FAMILY MEDICINE CLINIC | Facility: CLINIC | Age: 45
End: 2020-04-06

## 2020-04-06 DIAGNOSIS — R68.89 FLU-LIKE SYMPTOMS: Primary | ICD-10-CM

## 2020-04-06 PROCEDURE — 99213 OFFICE O/P EST LOW 20 MIN: CPT | Performed by: NURSE PRACTITIONER

## 2020-04-06 RX ORDER — GUAIFENESIN 400 MG/1
400 TABLET ORAL EVERY 6 HOURS PRN
Qty: 30 TABLET | Refills: 0 | Status: SHIPPED | OUTPATIENT
Start: 2020-04-06 | End: 2020-08-26

## 2020-04-06 RX ORDER — ALBUTEROL SULFATE 2.5 MG/3ML
2.5 SOLUTION RESPIRATORY (INHALATION) EVERY 6 HOURS PRN
Qty: 1 CONTAINER | Refills: 0 | Status: SHIPPED | OUTPATIENT
Start: 2020-04-06 | End: 2020-08-26

## 2020-04-06 NOTE — PROGRESS NOTES
Chief Complaint:   Patient presents with:  Acute    HPI:   Virtual/Telephone Check-In    Cathy Wheeler verbally consents to a Virtual/Telephone Check-In service on 04/06/20.   Patient understands and accepts financial responsibility for any deductible, HISTORY      left wrist      Social History:  Social History    Tobacco Use      Smoking status: Never Smoker      Smokeless tobacco: Never Used    Alcohol use: Yes      Comment: Rarely    Drug use: No    Family History:  Family History   Problem Relation Exam   He is alert and oriented x 3. His speech is clear. He is able to complete sentences without pausing. Mild dry cough noted at times. ASSESSMENT AND PLAN:   1. Flu-like symptoms  -Unclear if exposure to covid-19.  At this point, he does not meet cr

## 2020-04-08 NOTE — TELEPHONE ENCOUNTER
Called and spoke with pt regarding condition update. Pt confirmed 2 patient identifiers. Pt states he is not improving, but not worsening. \"Feels about the same\". Temps have been staying at 99.7, no higher and no lower.      States his breathing is st

## 2020-04-08 NOTE — TELEPHONE ENCOUNTER
I apologize, error in charting below. Pt taking TYLENOL for headaches, not ibuprofen. Will inform pt of instructions below.

## 2020-04-08 NOTE — TELEPHONE ENCOUNTER
Spoke with pt regarding provider response. Pt confirms he is taking TYLENOL, not ibuprofen. Pt also states that the dizziness is after coughing and some times after the nebulizer treatments.      Pt advised again on ED precautions and informed he would be c

## 2020-04-08 NOTE — TELEPHONE ENCOUNTER
Dizziness is new- he was not having this when we talked on Monday. Possibly from neb treatments. Recommend trying Tylenol instead of NSAID's- we talked about this on Monday. Continue supportive measures and POC discussed at TE visit.  ED precautions

## 2020-04-10 NOTE — TELEPHONE ENCOUNTER
Called pt and  lmom that we attempted to call him for a condition  Update and asked if he could call office today before 2pm and if not we will reach out to him on Monday

## 2020-04-13 NOTE — TELEPHONE ENCOUNTER
Spoke to patient and he said that it is going up and down. Fever back to 99.0 or 99.5 as he was 2-3 days feeling fever free and now back to low grade fevers. Sore throat, headache, are back as well. Pt has been out of work since 2 weeks ago.   First we

## 2020-04-14 NOTE — TELEPHONE ENCOUNTER
Continue supportive measures. Rest. Push fluids, especially oral electrolytes. May take Tylenol prn for fevers or sore throat/headache. Warm salt water gargles for sore. Needs to remain off work until symptom free x 3 days without symptom altering agents.

## 2020-08-26 ENCOUNTER — OFFICE VISIT (OUTPATIENT)
Dept: FAMILY MEDICINE CLINIC | Facility: CLINIC | Age: 45
End: 2020-08-26

## 2020-08-26 VITALS
HEIGHT: 72 IN | BODY MASS INDEX: 35.62 KG/M2 | DIASTOLIC BLOOD PRESSURE: 86 MMHG | TEMPERATURE: 99 F | WEIGHT: 263 LBS | RESPIRATION RATE: 16 BRPM | OXYGEN SATURATION: 96 % | HEART RATE: 68 BPM | SYSTOLIC BLOOD PRESSURE: 138 MMHG

## 2020-08-26 DIAGNOSIS — Z00.00 ANNUAL PHYSICAL EXAM: Primary | ICD-10-CM

## 2020-08-26 DIAGNOSIS — F41.1 GAD (GENERALIZED ANXIETY DISORDER): ICD-10-CM

## 2020-08-26 DIAGNOSIS — Z12.5 SCREENING PSA (PROSTATE SPECIFIC ANTIGEN): ICD-10-CM

## 2020-08-26 DIAGNOSIS — S39.92XD INJURY OF COCCYX, SUBSEQUENT ENCOUNTER: ICD-10-CM

## 2020-08-26 DIAGNOSIS — N52.9 ERECTILE DYSFUNCTION, UNSPECIFIED ERECTILE DYSFUNCTION TYPE: ICD-10-CM

## 2020-08-26 DIAGNOSIS — Z00.00 LABORATORY EXAMINATION ORDERED AS PART OF A ROUTINE GENERAL MEDICAL EXAMINATION: ICD-10-CM

## 2020-08-26 PROCEDURE — 3075F SYST BP GE 130 - 139MM HG: CPT | Performed by: FAMILY MEDICINE

## 2020-08-26 PROCEDURE — 3079F DIAST BP 80-89 MM HG: CPT | Performed by: FAMILY MEDICINE

## 2020-08-26 PROCEDURE — 99396 PREV VISIT EST AGE 40-64: CPT | Performed by: FAMILY MEDICINE

## 2020-08-26 PROCEDURE — 3008F BODY MASS INDEX DOCD: CPT | Performed by: FAMILY MEDICINE

## 2020-08-26 RX ORDER — TADALAFIL 20 MG/1
20 TABLET ORAL AS NEEDED
Qty: 24 TABLET | Refills: 0 | Status: SHIPPED | OUTPATIENT
Start: 2020-08-26 | End: 2020-12-15

## 2020-08-26 RX ORDER — BUPROPION HYDROCHLORIDE 150 MG/1
150 TABLET ORAL 2 TIMES DAILY
Qty: 60 TABLET | Refills: 2 | Status: CANCELLED | OUTPATIENT
Start: 2020-08-26

## 2020-08-29 NOTE — PROGRESS NOTES
HPI:    Lenny Thakur is a 38year old male who presents for Physical   Patient has history of BLANCHE and ADD-stable with no medications now, he weaned himself off medication and he reports his mood is stable and doesn't want to restart.    He reports rem EXAM:    /86   Pulse 68   Temp 98.8 °F (37.1 °C) (Temporal)   Resp 16   Ht 72\"   Wt 263 lb (119.3 kg)   SpO2 96%   BMI 35.67 kg/m²    Estimated body mass index is 35.67 kg/m² as calculated from the following:    Height as of this encounter: medical examination  -     CBC WITH DIFFERENTIAL WITH PLATELET; Future  -     COMP METABOLIC PANEL (14); Future  -     LIPID PANEL;  Future  -     TSH W REFLEX TO FREE T4; Future    Screening PSA (prostate specific antigen)  -     PSA SCREEN; Future    Erec

## 2020-09-23 ENCOUNTER — HOSPITAL ENCOUNTER (OUTPATIENT)
Age: 45
Discharge: HOME | End: 2020-09-23
Payer: COMMERCIAL

## 2020-09-23 VITALS
HEART RATE: 74 BPM | DIASTOLIC BLOOD PRESSURE: 85 MMHG | TEMPERATURE: 99 F | SYSTOLIC BLOOD PRESSURE: 153 MMHG | WEIGHT: 257 LBS | BODY MASS INDEX: 34.81 KG/M2 | OXYGEN SATURATION: 97 % | RESPIRATION RATE: 18 BRPM | HEIGHT: 72 IN

## 2020-09-23 DIAGNOSIS — H65.01 NON-RECURRENT ACUTE SEROUS OTITIS MEDIA OF RIGHT EAR: Primary | ICD-10-CM

## 2020-09-23 LAB — POCT RAPID STREP: NEGATIVE

## 2020-09-23 PROCEDURE — 87430 STREP A AG IA: CPT | Performed by: NURSE PRACTITIONER

## 2020-09-23 PROCEDURE — 99214 OFFICE O/P EST MOD 30 MIN: CPT

## 2020-09-23 PROCEDURE — 87081 CULTURE SCREEN ONLY: CPT | Performed by: NURSE PRACTITIONER

## 2020-09-23 RX ORDER — AMOXICILLIN 875 MG/1
875 TABLET, COATED ORAL 2 TIMES DAILY
Qty: 14 TABLET | Refills: 0 | Status: SHIPPED | OUTPATIENT
Start: 2020-09-23 | End: 2020-09-30

## 2020-09-23 NOTE — ED PROVIDER NOTES
Patient Seen in: [facility]      History   Patient presents with:  Sore Throat    Stated Complaint: SORE THROAT     50-year-old male presents the immediate care with right ear pain and right throat pain.   Patient states pain is (36.9 °C) (Temporal)   Resp 18   Ht 182.9 cm (6')   Wt 116.6 kg   SpO2 97%   BMI 34.86 kg/m²         Physical Exam  Vitals signs and nursing note reviewed. Constitutional:       Appearance: Normal appearance. HENT:      Head: Normocephalic.       Right [id number]  [phone]                Medications Prescribed:  Discharge Medication List as of 9/23/2020  3:28 PM    START taking these medications    amoxicillin 875 MG Oral Tab  Take 1 tablet (875 mg total) by mouth 2 (two) times daily for 7 days. , Normal,

## 2020-12-15 DIAGNOSIS — N52.9 ERECTILE DYSFUNCTION, UNSPECIFIED ERECTILE DYSFUNCTION TYPE: ICD-10-CM

## 2020-12-15 RX ORDER — TADALAFIL 20 MG/1
20 TABLET ORAL AS NEEDED
Qty: 24 TABLET | Refills: 0 | Status: SHIPPED | OUTPATIENT
Start: 2020-12-15

## 2020-12-16 ENCOUNTER — TELEPHONE (OUTPATIENT)
Dept: FAMILY MEDICINE CLINIC | Facility: CLINIC | Age: 45
End: 2020-12-16

## 2020-12-16 NOTE — TELEPHONE ENCOUNTER
I started PA for Tadalafil 20mg through [facility].  Medication is pending review as of 12/16/2020 @1:28PM.

## 2020-12-18 NOTE — TELEPHONE ENCOUNTER
Approved via [facility] (Tadalafil 20mg)   Effective from 12/18/2020 through 12/18/2021.

## 2021-04-05 ENCOUNTER — HOSPITAL ENCOUNTER (OUTPATIENT)
Age: 46
Discharge: HOME | End: 2021-04-05
Payer: COMMERCIAL

## 2021-04-05 ENCOUNTER — APPOINTMENT (OUTPATIENT)
Dept: GENERAL RADIOLOGY | Age: 46
End: 2021-04-05
Attending: NURSE PRACTITIONER
Payer: COMMERCIAL

## 2021-04-05 VITALS
TEMPERATURE: 99 F | OXYGEN SATURATION: 97 % | RESPIRATION RATE: 20 BRPM | DIASTOLIC BLOOD PRESSURE: 75 MMHG | WEIGHT: 270 LBS | HEIGHT: 72 IN | BODY MASS INDEX: 36.57 KG/M2 | HEART RATE: 66 BPM | SYSTOLIC BLOOD PRESSURE: 138 MMHG

## 2021-04-05 DIAGNOSIS — U07.1 COVID-19 VIRUS INFECTION: Primary | ICD-10-CM

## 2021-04-05 DIAGNOSIS — R73.9 HYPERGLYCEMIA: ICD-10-CM

## 2021-04-05 PROCEDURE — 99215 OFFICE O/P EST HI 40 MIN: CPT

## 2021-04-05 PROCEDURE — 93010 ELECTROCARDIOGRAM REPORT: CPT

## 2021-04-05 PROCEDURE — 85025 COMPLETE CBC W/AUTO DIFF WBC: CPT | Performed by: NURSE PRACTITIONER

## 2021-04-05 PROCEDURE — 99214 OFFICE O/P EST MOD 30 MIN: CPT

## 2021-04-05 PROCEDURE — 99453 REM MNTR PHYSIOL PARAM SETUP: CPT

## 2021-04-05 PROCEDURE — 85378 FIBRIN DEGRADE SEMIQUANT: CPT | Performed by: NURSE PRACTITIONER

## 2021-04-05 PROCEDURE — 93005 ELECTROCARDIOGRAM TRACING: CPT

## 2021-04-05 PROCEDURE — 71045 X-RAY EXAM CHEST 1 VIEW: CPT | Performed by: NURSE PRACTITIONER

## 2021-04-05 PROCEDURE — 84484 ASSAY OF TROPONIN QUANT: CPT

## 2021-04-05 PROCEDURE — 80047 BASIC METABLC PNL IONIZED CA: CPT

## 2021-04-05 RX ORDER — ALBUTEROL SULFATE 90 UG/1
2 AEROSOL, METERED RESPIRATORY (INHALATION) EVERY 4 HOURS PRN
Qty: 1 INHALER | Refills: 0 | Status: SHIPPED | OUTPATIENT
Start: 2021-04-05 | End: 2021-05-05

## 2021-04-05 NOTE — ED INITIAL ASSESSMENT (HPI)
Pt diagnosed with covid 4 days ago and yesterday began with some difficulty breathing, and lungs are tight.   It is hard to take a deep breath and worsens with activity

## 2021-04-05 NOTE — ED PROVIDER NOTES
Patient Seen in: [facility]      History   Patient presents with:  Difficulty Breathing    Stated Complaint: breathing issues    HPI/Subjective:   HPI  50-year-old male with history of ADD, hypertension, obstructive sleep apnea, who is no Exam     ED Triage Vitals [04/05/21 1044]   /73   Pulse 78   Resp 18   Temp 98.6 °F (37 °C)   Temp src Temporal   SpO2 96 %   O2 Device None (Room air)       Current:/75   Pulse 66   Temp 98.7 °F (37.1 °C) (Temporal)   Resp 20   Ht 182.9 cm (6' pneumothorax. CONCLUSION:  No focal consolidation.     Dictated by (CST): Linden Patel MD on 4/05/2021 at 12:33 PM     Finalized by (CST): Linden Patel MD on 4/05/2021 at 12:33 PM                  MDM      CBC normal.  Chem-8 with mildly elevated glu

## 2021-04-05 NOTE — ED QUICK NOTES
Patient arrived for monoclonal antibody infusion. PIV started and Bamlanivimab given. Call light within reach. VS rechecked per protocol. Pt tolerated infusion well, no adverse reactions noted. Infusion line flushed per protocol with 0.9NS.   PIV discont

## 2021-04-06 ENCOUNTER — TELEPHONE (OUTPATIENT)
Dept: CASE MANAGEMENT | Age: 46
End: 2021-04-06

## 2021-04-07 NOTE — TELEPHONE ENCOUNTER
2nd attempt to reach pt for home monitoring s/p MAB infusion on 4/5/21. Left message for pt to call back. TCC # left on VM.

## 2021-04-09 NOTE — TELEPHONE ENCOUNTER
Home monitoring assessment completed today (see below). Pt had MAB infusion on 4/5/21. VV with PCP scheduled for 4/12/21. Please f/u with pt for further monitoring after VV with PCP. Thank you!

## 2021-04-09 NOTE — TELEPHONE ENCOUNTER
Home Monitoring Condition Update    Covid19+ test date:   4/1/21    Consent Verification:  Assessment Completed With: Patient  HIPAA Verified?   Yes    COVID-19 HOME MONITORING 4/9/2021   Temperature 98.1   Reading From Ear   SPO2 97   Pulse taken from Pu assistance. Advised patient to monitor temp and HR twice/day for trends, get plenty of rest, push fluids and take Tylenol 1,000 mg q 6 hr PRN. Patient verbalized understanding.  NCM also explained home monitoring program and that patient will receive d

## 2021-04-12 ENCOUNTER — TELEMEDICINE (OUTPATIENT)
Dept: FAMILY MEDICINE CLINIC | Facility: CLINIC | Age: 46
End: 2021-04-12
Payer: COMMERCIAL

## 2021-04-12 DIAGNOSIS — U07.1 COVID-19: Primary | ICD-10-CM

## 2021-04-12 NOTE — PROGRESS NOTES
TELE HEALTH VISIT     HPI:    Betti Homans is a 54year old male who presents for Follow - Up (covid follow up )   Presenting via Tele health due to pandemic:       Presenting for follow up for COVID positive, patient was at [address] [place] [address] for evaluation, and no new skin lesions. Patient reports no acute back pain and reports no dizziness or headaches. Patient reports no visual disturbances and reports hearing has been about the same. Patient reports no recent injury or trauma.              EXAM:    There w medical decision-making and tests are ordered as detailed in the plan of care below. Phone Time Documentation:  Spent NA minutes with patient on phone discussing health concerns.     Video Time Documentation:  Spent 8 minutes with pt face to face and mor

## 2021-04-13 ENCOUNTER — PATIENT MESSAGE (OUTPATIENT)
Dept: FAMILY MEDICINE CLINIC | Facility: CLINIC | Age: 46
End: 2021-04-13

## 2021-04-13 ENCOUNTER — TELEPHONE (OUTPATIENT)
Dept: FAMILY MEDICINE CLINIC | Facility: CLINIC | Age: 46
End: 2021-04-13

## 2021-04-13 NOTE — TELEPHONE ENCOUNTER
Pt states plan was to RTW tomorrow 4/14. Pt says he woke up with some wheezing this AM and requesting RTW 4/19. Please advise, thanks.

## 2021-04-13 NOTE — TELEPHONE ENCOUNTER
Home Monitoring Condition Update    Covid19+ test date: 4/1/21    Consent Verification:  Assessment Completed With: Patient Patient   HIPAA Verified?   Yes    COVID-19 HOME MONITORING 4/13/2021   Temperature 97.9   Reading From Ear   SPO2 95   Pulse 72   Pu

## 2021-04-13 NOTE — TELEPHONE ENCOUNTER
From: Maurisio Almeida  To: Margarita Harman MD  Sent: 4/13/2021 2:46 PM CDT  Subject: Other    Amy Torres, I know you were going to right a letter saying that I can go back to work for tomorrow depending of how I was feeling.  If is possible would you

## 2021-04-14 ENCOUNTER — TELEPHONE (OUTPATIENT)
Dept: FAMILY MEDICINE CLINIC | Facility: CLINIC | Age: 46
End: 2021-04-14

## 2021-04-14 NOTE — TELEPHONE ENCOUNTER
Regarding: FW: Other  Contact: [phone]      ----- Message -----  From: Kenny Caba RN  Sent: 4/13/2021   2:49 PM CDT  To: Anil Hassan MD  Subject: Other                                            ----- Message from Joan Means RN sent at [facility]

## 2021-04-14 NOTE — TELEPHONE ENCOUNTER
Called pt and left vm instructing pt we are calling in regards to the Home Monitoring. Office number provided. Will boy for f/u on 4/15. Pt had MAB on 4/5/21.

## 2021-04-16 ENCOUNTER — TELEPHONE (OUTPATIENT)
Dept: FAMILY MEDICINE CLINIC | Facility: CLINIC | Age: 46
End: 2021-04-16

## 2021-04-16 NOTE — TELEPHONE ENCOUNTER
Home Monitoring Condition Update    Covid19+ test date: 4/1/21    Consent Verification:  Assessment Completed With: Patient  HIPAA Verified?   Yes    COVID-19 HOME MONITORING 4/13/2021   Temperature 97.9   Reading From Ear   SPO2 95   Pulse 72   Pulse taken completely isolated from the general public 3 days after symptoms resolve or 10 days total (whichever is longer). Advised patient if symptoms become worse/medical emergency to call 911.

## 2021-05-31 ENCOUNTER — APPOINTMENT (OUTPATIENT)
Dept: CT IMAGING | Facility: HOSPITAL | Age: 46
DRG: 085 | End: 2021-05-31
Attending: EMERGENCY MEDICINE
Payer: OTHER MISCELLANEOUS

## 2021-05-31 ENCOUNTER — APPOINTMENT (OUTPATIENT)
Dept: CT IMAGING | Facility: HOSPITAL | Age: 46
DRG: 085 | End: 2021-05-31
Attending: INTERNAL MEDICINE
Payer: OTHER MISCELLANEOUS

## 2021-05-31 ENCOUNTER — HOSPITAL ENCOUNTER (OUTPATIENT)
Age: 46
Discharge: ANOTHER HOSPITAL | End: 2021-05-31
Attending: EMERGENCY MEDICINE
Payer: OTHER MISCELLANEOUS

## 2021-05-31 ENCOUNTER — HOSPITAL ENCOUNTER (INPATIENT)
Facility: HOSPITAL | Age: 46
LOS: 1 days | Discharge: HOME | DRG: 085 | End: 2021-06-01
Attending: EMERGENCY MEDICINE | Admitting: HOSPITALIST
Payer: OTHER MISCELLANEOUS

## 2021-05-31 VITALS
DIASTOLIC BLOOD PRESSURE: 91 MMHG | OXYGEN SATURATION: 98 % | SYSTOLIC BLOOD PRESSURE: 159 MMHG | TEMPERATURE: 99 F | HEART RATE: 68 BPM | RESPIRATION RATE: 18 BRPM

## 2021-05-31 DIAGNOSIS — M79.89 LEG SWELLING: ICD-10-CM

## 2021-05-31 DIAGNOSIS — I61.9 INTRAPARENCHYMAL HEMORRHAGE OF BRAIN (HCC): Primary | ICD-10-CM

## 2021-05-31 DIAGNOSIS — S09.8XXD BLUNT HEAD TRAUMA, SUBSEQUENT ENCOUNTER: Primary | ICD-10-CM

## 2021-05-31 DIAGNOSIS — S02.32XA CLOSED FRACTURE OF LEFT ORBITAL FLOOR, INITIAL ENCOUNTER (HCC): ICD-10-CM

## 2021-05-31 DIAGNOSIS — S93.401D MODERATE RIGHT ANKLE SPRAIN, SUBSEQUENT ENCOUNTER: ICD-10-CM

## 2021-05-31 PROCEDURE — 70450 CT HEAD/BRAIN W/O DYE: CPT | Performed by: INTERNAL MEDICINE

## 2021-05-31 PROCEDURE — 99223 1ST HOSP IP/OBS HIGH 75: CPT | Performed by: HOSPITALIST

## 2021-05-31 PROCEDURE — 90471 IMMUNIZATION ADMIN: CPT

## 2021-05-31 PROCEDURE — 99213 OFFICE O/P EST LOW 20 MIN: CPT

## 2021-05-31 PROCEDURE — 70486 CT MAXILLOFACIAL W/O DYE: CPT | Performed by: EMERGENCY MEDICINE

## 2021-05-31 PROCEDURE — 99291 CRITICAL CARE FIRST HOUR: CPT | Performed by: NURSE PRACTITIONER

## 2021-05-31 PROCEDURE — 5A09357 ASSISTANCE WITH RESPIRATORY VENTILATION, LESS THAN 24 CONSECUTIVE HOURS, CONTINUOUS POSITIVE AIRWAY PRESSURE: ICD-10-PCS | Performed by: HOSPITALIST

## 2021-05-31 PROCEDURE — 72125 CT NECK SPINE W/O DYE: CPT | Performed by: EMERGENCY MEDICINE

## 2021-05-31 PROCEDURE — 70450 CT HEAD/BRAIN W/O DYE: CPT | Performed by: EMERGENCY MEDICINE

## 2021-05-31 PROCEDURE — 76377 3D RENDER W/INTRP POSTPROCES: CPT | Performed by: EMERGENCY MEDICINE

## 2021-05-31 RX ORDER — PROCHLORPERAZINE EDISYLATE 5 MG/ML
5 INJECTION INTRAMUSCULAR; INTRAVENOUS EVERY 8 HOURS PRN
Status: DISCONTINUED | OUTPATIENT
Start: 2021-05-31 | End: 2021-06-01

## 2021-05-31 RX ORDER — ACETAMINOPHEN 650 MG/1
650 SUPPOSITORY RECTAL EVERY 4 HOURS PRN
Status: DISCONTINUED | OUTPATIENT
Start: 2021-05-31 | End: 2021-06-01

## 2021-05-31 RX ORDER — ONDANSETRON 2 MG/ML
4 INJECTION INTRAMUSCULAR; INTRAVENOUS EVERY 6 HOURS PRN
Status: DISCONTINUED | OUTPATIENT
Start: 2021-05-31 | End: 2021-06-01

## 2021-05-31 RX ORDER — ACETAMINOPHEN 325 MG/1
650 TABLET ORAL EVERY 4 HOURS PRN
Status: DISCONTINUED | OUTPATIENT
Start: 2021-05-31 | End: 2021-06-01

## 2021-05-31 RX ORDER — DOCUSATE SODIUM 100 MG/1
100 CAPSULE, LIQUID FILLED ORAL 2 TIMES DAILY PRN
Status: DISCONTINUED | OUTPATIENT
Start: 2021-05-31 | End: 2021-06-01

## 2021-05-31 RX ORDER — HYDRALAZINE HYDROCHLORIDE 20 MG/ML
10 INJECTION INTRAMUSCULAR; INTRAVENOUS EVERY 2 HOUR PRN
Status: DISCONTINUED | OUTPATIENT
Start: 2021-05-31 | End: 2021-06-01

## 2021-05-31 RX ORDER — SODIUM CHLORIDE 9 MG/ML
INJECTION, SOLUTION INTRAVENOUS CONTINUOUS
Status: DISCONTINUED | OUTPATIENT
Start: 2021-05-31 | End: 2021-06-01

## 2021-05-31 RX ORDER — HYDROCODONE BITARTRATE AND ACETAMINOPHEN 5; 325 MG/1; MG/1
2 TABLET ORAL EVERY 4 HOURS PRN
Status: DISCONTINUED | OUTPATIENT
Start: 2021-05-31 | End: 2021-06-01

## 2021-05-31 RX ORDER — LORAZEPAM 2 MG/ML
1 INJECTION INTRAMUSCULAR
Status: DISCONTINUED | OUTPATIENT
Start: 2021-05-31 | End: 2021-06-01

## 2021-05-31 RX ORDER — HYDROCODONE BITARTRATE AND ACETAMINOPHEN 5; 325 MG/1; MG/1
1 TABLET ORAL EVERY 4 HOURS PRN
Status: DISCONTINUED | OUTPATIENT
Start: 2021-05-31 | End: 2021-06-01

## 2021-05-31 RX ORDER — METOCLOPRAMIDE HYDROCHLORIDE 5 MG/ML
10 INJECTION INTRAMUSCULAR; INTRAVENOUS EVERY 8 HOURS PRN
Status: DISCONTINUED | OUTPATIENT
Start: 2021-05-31 | End: 2021-06-01

## 2021-05-31 RX ORDER — ALBUTEROL SULFATE 90 UG/1
1 AEROSOL, METERED RESPIRATORY (INHALATION) EVERY 6 HOURS PRN
Status: DISCONTINUED | OUTPATIENT
Start: 2021-05-31 | End: 2021-06-01

## 2021-05-31 RX ORDER — ALBUTEROL SULFATE 90 UG/1
1 AEROSOL, METERED RESPIRATORY (INHALATION) EVERY 6 HOURS PRN
COMMUNITY

## 2021-05-31 RX ORDER — LABETALOL HYDROCHLORIDE 5 MG/ML
10 INJECTION, SOLUTION INTRAVENOUS EVERY 10 MIN PRN
Status: DISCONTINUED | OUTPATIENT
Start: 2021-05-31 | End: 2021-06-01

## 2021-05-31 RX ORDER — MELATONIN
3 NIGHTLY PRN
Status: DISCONTINUED | OUTPATIENT
Start: 2021-05-31 | End: 2021-06-01

## 2021-05-31 NOTE — ED PROVIDER NOTES
Patient Seen in: [facility] Emergency Department      History   Patient presents with:  Head Neck Injury    Stated Complaint: head injury on 05/29, sent for CT scan. No loc     HPI/Subjective:   HPI    Patient was at urgent care.   He suffered a head GUIDANCE NEEDLE PLACEMENT Bilateral 10/26/2015    Procedure: BURSA INJECTION;  Surgeon: Ariel Ceja MD;  Location: [address] [place] [address]   • OTHER Left 09/2017    work injury , left wrist    • OTHER SURGICAL HISTORY      left wrist Nontender  Abdomen: Soft, nondistended. Completely nontender  Extremities: Right lower leg in immobilizer device    Neurologic:  Mental status as above. Patient moves all extremities with good strength and coordination.          ED Course     Labs Reviewe disposition: 5/31/2021 12:25 PM                I discussed case with neurosurgery, Dr. Jared Gudino. He recommends admission to neuro intensive care unit  I discussed case with hospitalist, Dr. Zurdo Giang.   She will admit  I discussed case with neuro intensivist,

## 2021-05-31 NOTE — ED INITIAL ASSESSMENT (HPI)
Sent from [facility] for CT head scan after fall on 5/29 no LOC. Pt states he tripped and fell hitting left side of face and arm. Arrives with headache and blurry vision in left eye intermittent.  Denies N/V

## 2021-05-31 NOTE — PROGRESS NOTES
05/31/21 1458   Clinical Encounter Type   Visited With Patient   Referral To Nurse  ( provided HPOA form to patient and explained [employer]  Patient may want to complete the form after reviewing it.)   Patient's current code status is \

## 2021-05-31 NOTE — ED INITIAL ASSESSMENT (HPI)
Patient presents to [address] [place] [address] with workers injury from 5/29/21 when he fell into the asphalt after tripping on a board. He fell face forward and injured right ankle,knee and sustained abrasion to left side of face. Now with headache and states left eye with blurry

## 2021-05-31 NOTE — ED PROVIDER NOTES
Patient Seen in: [facility]      History   Patient presents with:   Worker's Comp  Ankle Pain  Head Injury    Stated Complaint: WORK INJURY/RIGHT ANKLE SPRAIN X 3 DAYS    HPI/Subjective:   HPI    Patient is a 44-year-old male presents to t wrist    • OTHER SURGICAL HISTORY      left wrist                 Social History    Tobacco Use      Smoking status: Never Smoker      Smokeless tobacco: Never Used    Vaping Use      Vaping Use: Never used    Alcohol use: Yes      Comment: Rarely    Drug there is tenderness to palpation over the lateral aspect of the right ankle. There is a mild abrasion without tenderness over the anterior aspect of the right knee. . Pulses are 2+ and equal in all 4 extremities.   NEURO: Patient is awake, alert and oriente

## 2021-05-31 NOTE — PLAN OF CARE
Pt sp fall from 5/29. Worsening HA and new blurry vision. CT reveal small parenchymal contusion and poss IPH. Neuro exam wnl, CRAIN. Right ankle swollen and brusied from fall. Repeat CT for 1800. Updated POC with pt and family at bedside.   Continue to mon

## 2021-05-31 NOTE — H&P
KATIE HOSPITALIST  History and Physical     [facility] Patient Status:  Inpatient    1975 MRN [id number]   [facility] 6NE-A Attending Maribel Clark MD   Hosp Day # 0 PCP Veronica Motta MD     Chief Complaint: s/p fall, injury he does not use drugs.     Family History:   Family History   Problem Relation Age of Onset   • Personality Disorder Mother    • Depression Mother    • Anxiety Mother         Allergies: No Known Allergies    Medications:  No current facility-administered me of 0.98 mg/dL). No results for input(s): PTP, INR in the last 168 hours. COVID-19 Lab Results    COVID-19  Lab Results   Component Value Date    COVID19 Not Detected 05/31/2021       Pro-Calcitonin  No results for input(s): PCT in the last 168 hours.

## 2021-06-01 ENCOUNTER — APPOINTMENT (OUTPATIENT)
Dept: CT IMAGING | Facility: HOSPITAL | Age: 46
DRG: 085 | End: 2021-06-01
Attending: NURSE PRACTITIONER
Payer: OTHER MISCELLANEOUS

## 2021-06-01 VITALS
HEIGHT: 72 IN | BODY MASS INDEX: 37.95 KG/M2 | TEMPERATURE: 98 F | RESPIRATION RATE: 20 BRPM | HEART RATE: 84 BPM | WEIGHT: 280.19 LBS | DIASTOLIC BLOOD PRESSURE: 74 MMHG | OXYGEN SATURATION: 98 % | SYSTOLIC BLOOD PRESSURE: 112 MMHG

## 2021-06-01 PROBLEM — S06.339A FOCAL HEMORRHAGIC CONTUSION OF CEREBRUM (HCC): Status: ACTIVE | Noted: 2021-05-31

## 2021-06-01 PROBLEM — Z99.89 OSA ON CPAP: Status: ACTIVE | Noted: 2017-03-10

## 2021-06-01 PROBLEM — S06.33AA FOCAL HEMORRHAGIC CONTUSION OF CEREBRUM: Status: ACTIVE | Noted: 2021-05-31

## 2021-06-01 PROBLEM — G47.33 OSA ON CPAP: Status: ACTIVE | Noted: 2017-03-10

## 2021-06-01 PROBLEM — S06.33AA FOCAL HEMORRHAGIC CONTUSION OF CEREBRUM (HCC): Status: ACTIVE | Noted: 2021-05-31

## 2021-06-01 PROCEDURE — 99291 CRITICAL CARE FIRST HOUR: CPT | Performed by: INTERNAL MEDICINE

## 2021-06-01 PROCEDURE — 70450 CT HEAD/BRAIN W/O DYE: CPT | Performed by: NURSE PRACTITIONER

## 2021-06-01 PROCEDURE — 99239 HOSP IP/OBS DSCHRG MGMT >30: CPT | Performed by: HOSPITALIST

## 2021-06-01 RX ORDER — LABETALOL HYDROCHLORIDE 5 MG/ML
10 INJECTION, SOLUTION INTRAVENOUS EVERY 4 HOURS PRN
Status: DISCONTINUED | OUTPATIENT
Start: 2021-06-01 | End: 2021-06-01

## 2021-06-01 NOTE — PLAN OF CARE
Assumed care at 0730. Pt neurologically intact, VSS, NSR on tele. Pt cleared to discharge home today.

## 2021-06-01 NOTE — CONSULTS
[facility]  Neurocritical Care Consult Note    Senia Cat Patient Status:  Inpatient    1975 MRN [id number]   [facility] 6NE-A Attending Melia Chairez MD   Hosp Day # 1 PCP Mellissa Stephens MD       Reason for Socioeconomic History      Marital status:       Spouse name: Not on file      Number of children: Not on file      Years of education: Not on file      Highest education level: Not on file    Tobacco Use      Smoking status: Never Smoker      Smok production  Cardiac:               Denies chest pain, palpitations or lower extremity edema. GI:                         Denies constipation, heartburn or melena. :                       Denies dysuria or hematuria.   Skin:                     Denies ra Lab 05/31/21  1224 06/01/21  0432    141   K 4.3 4.8    109   CO2 27.0 28.0   * 121*   BUN 16 16   CREATSERUM 0.98 0.88     Recent Labs   Lab 05/31/21  1224 06/01/21  0432   WBC 9.0 6.6   HGB 13.7 13.6   .0 187.0       Assesment

## 2021-06-01 NOTE — PROGRESS NOTES
KATIE HOSPITALIST  Progress Note     Tamika Wise Patient Status:  Inpatient    1975 MRN [id number]   [facility]A Attending Antonio Gomez MD   Hosp Day # 1 PCP Jojo Conklin MD     Chief Complaint: s/p fall    S: Patient fe input(s): TROP, PBNP in the last 168 hours. Creatinine Kinase  No results for input(s): CK in the last 168 hours. Inflammatory Markers  No results for input(s): CRP, SEEMA, LDH, DDIMER in the last 168 hours. Imaging: Imaging data reviewed in Epic.

## 2021-06-01 NOTE — OCCUPATIONAL THERAPY NOTE
OCCUPATIONAL THERAPY QUICK EVALUATION - INPATIENT    Room Number: 7634/4102-R  Evaluation Date: 6/1/2021     Type of Evaluation: Quick Eval  Presenting Problem: fall, L temporal lobe IPH, L orbital floor fracture    Physician Order: IP Consult to Caprice Dunn Laterality Date   • ANKLE FRACTURE SURGERY     • DRAIN/INJECT LARGE JOINT/BURSA Bilateral 10/26/2015    Procedure: BURSA INJECTION;  Surgeon: Js Flores MD;  Location: [facility]   • FLUOROSCOPIC GUIDANCE NEEDLE PLACEMENT Bilateral regular upper body clothing?: None  -   Taking care of personal grooming such as brushing teeth?: None  -   Eating meals?: None    AM-PAC Score:  Score: 24  Approx Degree of Impairment: 0%  Standardized Score (AM-PAC Scale): 57.54  CMS Modifier (G-Code): C with no skilled Occupational Therapy needs at this time. Patient discharged from Occupational Therapy services. Please re-order if a new functional limitation presents during this admission.     Patient was able to achieve the following goals:  Patient ab

## 2021-06-01 NOTE — PHYSICAL THERAPY NOTE
PHYSICAL THERAPY QUICK EVALUATION - INPATIENT    Room Number: 7751/9305-U  Evaluation Date: 6/1/2021  Presenting Problem: Parenchymal hemorrhage L temporal lobe, L orbital bone fx  Physician Order: PT Eval and Treat    HPI: Patient is a 54year old male wh site    • Sleep apnea        Past Surgical History  Past Surgical History:   Procedure Laterality Date   • ANKLE FRACTURE SURGERY     • DRAIN/INJECT LARGE JOINT/BURSA Bilateral 10/26/2015    Procedure: BURSA INJECTION;  Surgeon: Jony Trinh MD;  Tomasa Murray difficulty does the patient currently have. ..  -   Turning over in bed (including adjusting bedclothes, sheets and blankets)?: None   -   Sitting down on and standing up from a chair with arms (e.g., wheelchair, bedside commode, etc.): None   -   Moving fr in chair;Needs met;Call light within reach;RN aware of session/findings; All patient questions and concerns addressed    ASSESSMENT   Patient is a 54year old male admitted on 5/31/2021 for fall resulting in hemorrhagic contusion of cerebrum, L orbital bone

## 2021-06-01 NOTE — PLAN OF CARE
Assumed care of Bryant @ approximately 7907: awake, alert, oriented, pleasant, and cooperative with care, Q1H neuro checks and intact except some blurry vision which Yannick Hastings reports is beginning to resolve; on room; NSR/SB on the monitor: HR 50's-40's over increase activity and self care with guidance from PT/OT  - Encourage visually impaired, hearing impaired and aphasic patients to use assistive/communication devices  Outcome: Progressing     Problem: PAIN - ADULT  Goal: Verbalizes/displays adequate comfor

## 2021-06-01 NOTE — SLP NOTE
SPEECH/LANGUAGE/COGNITIVE EVALUATION - INPATIENT    Admission Date: 5/31/2021  Evaluation Date: 06/01/21    Reason for Referral: Other (Comment) (IPH)    ASSESSMENT & PLAN   ASSESSMENT & IMPRESSION  Order received per protocol, chart reviewed.  This is a 55 strategies independently over 1 session(s).     Met   Goal #3 Complete cognitive communication evaluation as per protocol Met     Prior Living Situation: Home with support  Prior Level of Function: Independent      Imaging Results: Head CT today:  No interv

## 2021-06-01 NOTE — CONSULTS
[facility]  Neurosurgery Consult    Clay Andi Mariscal Patient Status:  Inpatient    1975 MRN [id number]   [facility] 6NE-A Attending Namita Bah MD   Hosp Day # 1 PCP Eda Alvarado MD     REASON FOR CONSULTATION:  L temporal IC • OTHER Left 09/2017    work injury , left wrist    • OTHER SURGICAL HISTORY      left wrist        FAMILY HISTORY:  family history includes Anxiety in his mother; Depression in his mother; Personality Disorder in his mother.     SOCIAL HISTORY:   reports PRN  melatonin tab 3 mg, 3 mg, Oral, Nightly PRN        REVIEW OF SYSTEMS:  A 10-point system was reviewed. Pertinent positives and negatives are noted in HPI.       PHYSICAL EXAMINATION:  VITAL SIGNS: /54   Pulse 61   Temp 97.7 °F (36.5 °C) (Tempora 1. Suspected small parenchymal contusion and perhaps small intraparenchymal hemorrhage within the anterior aspect of the left temporal lobe measuring approximately 6 mm.  Slight degree of surrounding cerebral edema is also suspected.    2. Close short-ter transcribed by Technologist)  Left temporal contusion            FINDINGS:     VENTRICLES/SULCI:  Ventricles and sulci are normal in size.     INTRACRANIAL:  Stable 6 mm hyperdense focus in the left temporal lobe which may represent a small stable focus of year old male s/p fall w/ L temporal ICH, L orbital fx    Plan:  Pt seen and examined with Dr. Cheri Zuluaga from neurosurgical standpoint to transfer to floor, discharge once cleared by other services  PT/OT consults  Off work until follow up with Dr. Goss Spar

## 2021-06-01 NOTE — PROGRESS NOTES
[facility]  Neurocritical Care APRN Progress Note    NAME: Alejandro Viramontes - ROOM: 7196/3384-U - MRN: [id number] - Age: 54year old - :1975    History Of Present Illness:  Alejandro Viramontes is a 54year old male with PMHx sig BMI 37.43 kg/m²               Physical Exam:    General Appearance: Alert, cooperative, no distress, appears stated age  Neck: Supple, symmetrical, trachea midline, no carotid bruits, adenopathy, or JVD  Lungs: Clear to auscultation bilaterally, respiratio imaging is recommended. 3. The findings of this critical value study were discussed with Dr. Chintan Palacios on 5/31/2021 at 1213 hours. Read back was performed.    Dictated by (CST): Benny Lizarraga DO on 5/31/2021 at 12:09 PM     Finalized by (CST): Benny Lizarraga DO pain medication  -Ok to use CPAP mask    3. Left ankle sprain  -Boot with weight bearing    4.  RADHA - home nasal CPAP    F/E/N:  Cardiac diet    Proph:  -No a/c at this time d/t evidence of bleeding  -SCD    Dispo:   -Full code    Plan of care discussed wit

## 2021-06-02 ENCOUNTER — PATIENT OUTREACH (OUTPATIENT)
Dept: CASE MANAGEMENT | Age: 46
End: 2021-06-02

## 2021-06-02 DIAGNOSIS — Z02.9 ENCOUNTERS FOR UNSPECIFIED ADMINISTRATIVE PURPOSE: ICD-10-CM

## 2021-06-02 DIAGNOSIS — S02.32XA CLOSED FRACTURE OF LEFT ORBITAL FLOOR, INITIAL ENCOUNTER (HCC): ICD-10-CM

## 2021-06-02 NOTE — DISCHARGE SUMMARY
KATIE HOSPITALIST  DISCHARGE SUMMARY     Alejandro Viramontes Patient Status:  Inpatient    1975 MRN [id number]   [facility] 6NE-A Attending Wali Giron MD   Hosp Day # 1 PCP Boone Lux MD     Date of Admission: 2021  Date of Low Risk         TCM Follow-Up Recommendation:  LACE 29-58: Moderate Risk of readmission after discharge from the hospital.    Procedures during hospitalization:   CT of the headCONCLUSION:     1.  Suspected small parenchymal contusion and perhaps small int MCG/ACT Aers      Inhale 1 puff into the lungs every 6 (six) hours as needed for Wheezing. Refills: 0     Tadalafil 20 MG Tabs  Commonly known as: Cialis      Take 1 tablet (20 mg total) by mouth as needed for Erectile Dysfunction.    Quantity: 24 tablet

## 2021-06-03 ENCOUNTER — TELEPHONE (OUTPATIENT)
Dept: FAMILY MEDICINE CLINIC | Facility: CLINIC | Age: 46
End: 2021-06-03

## 2021-06-03 ENCOUNTER — PATIENT OUTREACH (OUTPATIENT)
Dept: CASE MANAGEMENT | Age: 46
End: 2021-06-03

## 2021-06-03 DIAGNOSIS — S93.402A SPRAIN OF LEFT ANKLE, UNSPECIFIED LIGAMENT, INITIAL ENCOUNTER: ICD-10-CM

## 2021-06-03 DIAGNOSIS — S02.32XA CLOSED FRACTURE OF LEFT ORBITAL FLOOR, INITIAL ENCOUNTER (HCC): Primary | ICD-10-CM

## 2021-06-03 DIAGNOSIS — S06.339A FOCAL HEMORRHAGIC CONTUSION OF CEREBRUM (HCC): ICD-10-CM

## 2021-06-03 NOTE — PROGRESS NOTES
1st attempt Ophthalmology apt request    [address]  [address]  [place], [place],[address]    Unable to contact patient. LVM for CB.

## 2021-06-03 NOTE — TELEPHONE ENCOUNTER
Referrals placed in Epic. Patient also have follow-up with Dr. Chelo Stovall (neurosurgery). Referrals placed in Epic. Called patient and spoke with him. Advised him of this information. Patient states understanding.

## 2021-06-03 NOTE — PROGRESS NOTES
Initial Post Discharge Follow Up   Discharge Date: 6/1/21  Contact Date: 6/3/2021    Consent Verification:  Assessment Completed With: Patient  HIPAA Verified? Yes    Discharge Dx:  1.  Left temporal hemorrhagic contusion secondary to mechanical fall  2 medication changes noted on AVS?  no  • May I go over your medications with you to make sure we are not missing anything? yes  • Are there any reasons that keep you from taking your medication as prescribed?  No  Are you having any concerns with constipation headache and blurry vision. Patient was educated and advised if he should experience similar symptoms to what brought him in to the hospital or develop SOB, chest pain, inability to see or other new concerns, he is to return to the ER or call 911.   Patient

## 2021-06-03 NOTE — PROGRESS NOTES
Patient has follow up appt needing to be scheduled with Dr. Hoda Pierce (Ophthalmology) in 1 week. No preference on date/time of appt. Please contact patient to schedule, thank you!

## 2021-06-03 NOTE — TELEPHONE ENCOUNTER
Spoke with pt for TCM today, scheduled TCM HFU appt for 6-8-21.   While on the phone, pt mentioned he needs a referral to see Ortho (he couldn't remember the name of the doctor he has seen but said it's in [place]) and he may also need one for Dr. Cale Swenson

## 2021-06-04 NOTE — PROGRESS NOTES
2nd attempt Ophthalmology apt request     [address] ([address]  [address]  [place], [place],[address]    Unable to contact patient. LVM for CB.

## 2021-06-04 NOTE — PROGRESS NOTES
[address]  [address]  [phone]  Apt made for Sat.  June 5th @10am

## 2021-06-07 ENCOUNTER — OFFICE VISIT (OUTPATIENT)
Dept: SURGERY | Facility: CLINIC | Age: 46
End: 2021-06-07
Payer: OTHER MISCELLANEOUS

## 2021-06-07 ENCOUNTER — TELEPHONE (OUTPATIENT)
Dept: SURGERY | Facility: CLINIC | Age: 46
End: 2021-06-07

## 2021-06-07 VITALS
SYSTOLIC BLOOD PRESSURE: 140 MMHG | BODY MASS INDEX: 37.93 KG/M2 | WEIGHT: 280 LBS | HEIGHT: 72 IN | DIASTOLIC BLOOD PRESSURE: 106 MMHG

## 2021-06-07 DIAGNOSIS — S06.339A FOCAL HEMORRHAGIC CONTUSION OF CEREBRUM (HCC): Primary | ICD-10-CM

## 2021-06-07 PROCEDURE — 3077F SYST BP >= 140 MM HG: CPT | Performed by: NEUROLOGICAL SURGERY

## 2021-06-07 PROCEDURE — 3080F DIAST BP >= 90 MM HG: CPT | Performed by: NEUROLOGICAL SURGERY

## 2021-06-07 PROCEDURE — 3008F BODY MASS INDEX DOCD: CPT | Performed by: NEUROLOGICAL SURGERY

## 2021-06-07 PROCEDURE — 99213 OFFICE O/P EST LOW 20 MIN: CPT | Performed by: NEUROLOGICAL SURGERY

## 2021-06-07 RX ORDER — CEPHALEXIN 500 MG/1
CAPSULE ORAL
COMMUNITY
Start: 2021-06-05 | End: 2021-06-21 | Stop reason: ALTCHOICE

## 2021-06-07 NOTE — PROGRESS NOTES
Rolled right ankle  Hit head on the floor  Knee is painful too    HA constant does go down with tylenol  Little bit of lightheadedness, when he has to concentrate, using phone etc

## 2021-06-07 NOTE — PROGRESS NOTES
[facility]  Neurological Surgery Follow Up Clinic Note    Juan Ames 54year old, male    1975 MRN [id number]   PCP Marilyn Keller MD Allergies No Known Allergies     SUBJECTIVE:  Juan Ames is a 54year old male w/ in the middle cranial fossa within the anterior left temporal lobe. Becka Kate   has been no interval change in the appearance or size of this lesion consistent with a small parenchymal hematoma. Becka Kate is no new hematoma identified. Becka Kate is no significant m

## 2021-06-08 ENCOUNTER — OFFICE VISIT (OUTPATIENT)
Dept: FAMILY MEDICINE CLINIC | Facility: CLINIC | Age: 46
End: 2021-06-08
Payer: OTHER MISCELLANEOUS

## 2021-06-08 VITALS
OXYGEN SATURATION: 96 % | HEART RATE: 82 BPM | TEMPERATURE: 98 F | SYSTOLIC BLOOD PRESSURE: 138 MMHG | DIASTOLIC BLOOD PRESSURE: 88 MMHG | HEIGHT: 72 IN | WEIGHT: 283 LBS | BODY MASS INDEX: 38.33 KG/M2 | RESPIRATION RATE: 18 BRPM

## 2021-06-08 DIAGNOSIS — M70.71 BURSITIS OF BOTH HIPS, UNSPECIFIED BURSA: ICD-10-CM

## 2021-06-08 DIAGNOSIS — S02.32XA CLOSED FRACTURE OF LEFT ORBITAL FLOOR, INITIAL ENCOUNTER (HCC): ICD-10-CM

## 2021-06-08 DIAGNOSIS — S06.339A FOCAL HEMORRHAGIC CONTUSION OF CEREBRUM (HCC): ICD-10-CM

## 2021-06-08 DIAGNOSIS — M47.816 SPONDYLOSIS OF LUMBAR REGION WITHOUT MYELOPATHY OR RADICULOPATHY: Primary | ICD-10-CM

## 2021-06-08 DIAGNOSIS — M70.72 BURSITIS OF BOTH HIPS, UNSPECIFIED BURSA: ICD-10-CM

## 2021-06-08 PROCEDURE — 3008F BODY MASS INDEX DOCD: CPT | Performed by: FAMILY MEDICINE

## 2021-06-08 PROCEDURE — 99496 TRANSJ CARE MGMT HIGH F2F 7D: CPT | Performed by: FAMILY MEDICINE

## 2021-06-08 PROCEDURE — 3079F DIAST BP 80-89 MM HG: CPT | Performed by: FAMILY MEDICINE

## 2021-06-08 PROCEDURE — 3075F SYST BP GE 130 - 139MM HG: CPT | Performed by: FAMILY MEDICINE

## 2021-06-08 NOTE — TELEPHONE ENCOUNTER
Forms initiated and placed on Dr. Freya Segovia desk for review and signature.      Forms to be faxed to [phone] Attn: Kartik 49

## 2021-06-08 NOTE — PROGRESS NOTES
HPI:    Maurisio Almeida is a 54year old male her today for hospital follow up. Discharge Summary was obtained and reviewed.       Issues and concerns since discharge:      Health Status:  Improved    Follow up of pending results and treatments from h INJECTION;  Surgeon: Sharonda Whiting MD;  Location: [facility]   • OTHER Left 09/2017    work injury , left wrist    • OTHER SURGICAL HISTORY      left wrist       Family History   Problem Relation Age of Onset   • Personality Disorder Abused:       Sexually Abused:      ROS:   Patient denies shortness of breath, denies chest pain and denies any recent fevers or chills. Patient reports no urinary complaints and denies headaches or visual disturbances.    Patient denies any abdominal pa are true:  1. Communication with the patient was made within 2 business days of discharge. 2. Complexity of Medical Decision Making is Moderate. 3. Patient seen within 7 days.

## 2021-06-14 PROBLEM — S93.401A SEVERE ANKLE SPRAIN, RIGHT, INITIAL ENCOUNTER: Status: ACTIVE | Noted: 2021-06-14

## 2021-06-14 PROBLEM — M25.461 EFFUSION OF KNEE JOINT RIGHT: Status: ACTIVE | Noted: 2021-06-14

## 2021-06-14 PROBLEM — M70.41 PREPATELLAR BURSITIS, RIGHT KNEE: Status: ACTIVE | Noted: 2021-06-14

## 2021-06-15 NOTE — TELEPHONE ENCOUNTER
Received signed and completed forms from Dr. Winston Stone made    Endorsed to  staff with printed scanning sheet    Faxed forms to [facility] department at [phone] as requested, fax confirmation received     Messaged pt via [facility] to inform antelmo

## 2021-06-21 ENCOUNTER — OFFICE VISIT (OUTPATIENT)
Dept: SURGERY | Facility: CLINIC | Age: 46
End: 2021-06-21
Payer: OTHER MISCELLANEOUS

## 2021-06-21 VITALS
SYSTOLIC BLOOD PRESSURE: 122 MMHG | HEIGHT: 72 IN | BODY MASS INDEX: 38.33 KG/M2 | WEIGHT: 283 LBS | DIASTOLIC BLOOD PRESSURE: 82 MMHG | HEART RATE: 67 BPM

## 2021-06-21 DIAGNOSIS — S06.339A FOCAL HEMORRHAGIC CONTUSION OF CEREBRUM (HCC): Primary | ICD-10-CM

## 2021-06-21 PROCEDURE — 3008F BODY MASS INDEX DOCD: CPT | Performed by: NEUROLOGICAL SURGERY

## 2021-06-21 PROCEDURE — 3074F SYST BP LT 130 MM HG: CPT | Performed by: NEUROLOGICAL SURGERY

## 2021-06-21 PROCEDURE — 3079F DIAST BP 80-89 MM HG: CPT | Performed by: NEUROLOGICAL SURGERY

## 2021-06-21 PROCEDURE — 99213 OFFICE O/P EST LOW 20 MIN: CPT | Performed by: NEUROLOGICAL SURGERY

## 2021-06-21 NOTE — PROGRESS NOTES
Patient here for 2-week follow-up. Has been feeling much better. Almost nonexistent headaches. Still some blurry vision, right eye more than left.

## 2021-06-21 NOTE — PROGRESS NOTES
[address]  Neurological Surgery Follow Up Clinic Note    Fouzia Lopez 54year old, male    1975 MRN [id number]   PCP Janie Aguilar MD Allergies No Known Allergies     SUBJECTIVE:  Senait Baxter a 54year old male w/ He is alert and oriented to person, place, and time. Cranial Nerves: Cranial nerves are intact. Motor: No pronator drift. Comments: Walks with a severe limp     DIAGNOSIS:  1.  Focal hemorrhagic contusion of cerebrum ([facility] [address]     PLAN:  1. I am

## 2021-06-25 ENCOUNTER — HOSPITAL ENCOUNTER (OUTPATIENT)
Dept: MRI IMAGING | Age: 46
Discharge: HOME | End: 2021-06-25
Attending: ORTHOPAEDIC SURGERY
Payer: OTHER MISCELLANEOUS

## 2021-06-25 DIAGNOSIS — M25.461 EFFUSION OF KNEE JOINT RIGHT: ICD-10-CM

## 2021-06-25 PROCEDURE — 73721 MRI JNT OF LWR EXTRE W/O DYE: CPT | Performed by: ORTHOPAEDIC SURGERY

## 2021-06-25 NOTE — PROGRESS NOTES
CONCLUSION:     1. There is a large amount of fluid anterior to the patella consistent with prepatellar bursitis. Electa Irons is no significant knee joint effusion. 2. Cruciate and collateral ligaments are intact. 3. Menisci are intact.    4. There is no ful

## 2021-07-02 ENCOUNTER — TELEPHONE (OUTPATIENT)
Dept: PHYSICAL THERAPY | Facility: HOSPITAL | Age: 46
End: 2021-07-02

## 2021-07-02 ENCOUNTER — ORDER TRANSCRIPTION (OUTPATIENT)
Dept: PHYSICAL THERAPY | Facility: HOSPITAL | Age: 46
End: 2021-07-02

## 2021-07-02 DIAGNOSIS — S93.401A SEVERE ANKLE SPRAIN, RIGHT, INITIAL ENCOUNTER: Primary | ICD-10-CM

## 2021-07-12 ENCOUNTER — OFFICE VISIT (OUTPATIENT)
Dept: PHYSICAL THERAPY | Age: 46
End: 2021-07-12
Attending: ORTHOPAEDIC SURGERY
Payer: OTHER MISCELLANEOUS

## 2021-07-12 DIAGNOSIS — S93.401A SEVERE ANKLE SPRAIN, RIGHT, INITIAL ENCOUNTER: ICD-10-CM

## 2021-07-12 PROCEDURE — 97110 THERAPEUTIC EXERCISES: CPT

## 2021-07-12 PROCEDURE — 97161 PT EVAL LOW COMPLEX 20 MIN: CPT

## 2021-07-12 NOTE — PROGRESS NOTES
LOWER EXTREMITY EVALUATION:   Referring Physician: Dr. Radha Ly  Diagnosis: Severe ankle sprain, right, initial encounter (Z71.289N) Date of Service: 7/12/2021     PATIENT SUMMARY   Paige Duvall is a 54year old pleasant male who presents to therapy limitations include squatting, going up/down stairs without pain, getting in/out of car because of his knee and pushing down on the pedals when driving. Subha Fatima describes prior level of function as active with no limitations.  Pt goals include getting back t Precautions:  None  OBJECTIVE:   Observation:     Pt exhibits mild-moderate swelling over the medial, lateral and dorsal aspects of the right ankle.      Girth (circumferential) - R 31 cm ; L 30 cm    Palpation: Pt is tender over the anterolateral aspec 30 sec    Stairs: Patient ambulates using a step-to gait pattern yet is able to exhibit reciprocal pattern despite increased pain. Patient utilizes railing for UE support.     Today’s Treatment and Response:   Pt education was provided on exam findings, jeanmarie None    Rehab Potential:good    FOTO: Will complete next session - 7/15/21    Patient/Family/Caregiver was advised of these findings, precautions, and treatment options and has agreed to actively participate in planning and for this course of care.     Than

## 2021-07-15 ENCOUNTER — APPOINTMENT (OUTPATIENT)
Dept: PHYSICAL THERAPY | Age: 46
End: 2021-07-15
Attending: ORTHOPAEDIC SURGERY
Payer: OTHER MISCELLANEOUS

## 2021-07-15 PROCEDURE — 97110 THERAPEUTIC EXERCISES: CPT

## 2021-07-15 PROCEDURE — 97140 MANUAL THERAPY 1/> REGIONS: CPT

## 2021-07-15 NOTE — PROGRESS NOTES
Dx:    Severe ankle sprain, right, initial encounter (S93.401A)   Insurance (Authorized # of Visits):  8          Authorizing Physician: Dr. Christy Cerda  Next MD visit: none scheduled  Fall Risk: standard         Precautions: n/a             Subjective: Lina Johnson improve functional mobility. 6. Patient will increase R SLS to at least 30 seconds in order to safely ascend/descend steps and stairs with a reciprocal gait pattern.   7. Patient will ambulate for at least 15 minutes with mild to little symptom complaints

## 2021-07-19 ENCOUNTER — TELEPHONE (OUTPATIENT)
Dept: PHYSICAL THERAPY | Facility: HOSPITAL | Age: 46
End: 2021-07-19

## 2021-07-20 ENCOUNTER — APPOINTMENT (OUTPATIENT)
Dept: PHYSICAL THERAPY | Age: 46
End: 2021-07-20
Payer: OTHER MISCELLANEOUS

## 2021-07-21 ENCOUNTER — OFFICE VISIT (OUTPATIENT)
Dept: SURGERY | Facility: CLINIC | Age: 46
End: 2021-07-21
Payer: OTHER MISCELLANEOUS

## 2021-07-21 VITALS
WEIGHT: 283 LBS | HEIGHT: 72 IN | SYSTOLIC BLOOD PRESSURE: 124 MMHG | BODY MASS INDEX: 38.33 KG/M2 | DIASTOLIC BLOOD PRESSURE: 90 MMHG

## 2021-07-21 DIAGNOSIS — S06.339A FOCAL HEMORRHAGIC CONTUSION OF CEREBRUM (HCC): Primary | ICD-10-CM

## 2021-07-21 PROCEDURE — 3008F BODY MASS INDEX DOCD: CPT | Performed by: NEUROLOGICAL SURGERY

## 2021-07-21 PROCEDURE — 3080F DIAST BP >= 90 MM HG: CPT | Performed by: NEUROLOGICAL SURGERY

## 2021-07-21 PROCEDURE — 99213 OFFICE O/P EST LOW 20 MIN: CPT | Performed by: NEUROLOGICAL SURGERY

## 2021-07-21 PROCEDURE — 3074F SYST BP LT 130 MM HG: CPT | Performed by: NEUROLOGICAL SURGERY

## 2021-07-21 NOTE — PROGRESS NOTES
[facility]  Neurological Surgery Follow Up Clinic Note    Paulette Palma 54year old, male    1975 MRN [id number]   PCP Rell Cooley MD Allergies No Known Allergies     SUBJECTIVE:  SUBJECTIVE:  Early Men a 54 year standpoint. 2. Patient will be discharged from the neurosurgery office at this time.     Senait Oleary MD, [facility]  Board Certified, Neurological Surgeon  [facility]  [address], [address] [facility] [address], [address] [facility]

## 2021-07-22 ENCOUNTER — APPOINTMENT (OUTPATIENT)
Dept: PHYSICAL THERAPY | Age: 46
End: 2021-07-22
Attending: ORTHOPAEDIC SURGERY
Payer: OTHER MISCELLANEOUS

## 2021-07-22 PROCEDURE — 97140 MANUAL THERAPY 1/> REGIONS: CPT

## 2021-07-22 PROCEDURE — 97110 THERAPEUTIC EXERCISES: CPT

## 2021-07-22 NOTE — PROGRESS NOTES
Dx:    Severe ankle sprain, right, initial encounter (S93.401A)   Insurance (Authorized # of Visits):  8          Authorizing Physician: Dr. Lila Mitchell  Next MD visit: none scheduled  Fall Risk: standard         Precautions: n/a             Subjective: Subha Fatima functional mobility. 6. Patient will increase R SLS to at least 30 seconds in order to safely ascend/descend steps and stairs with a reciprocal gait pattern.   7. Patient will ambulate for at least 15 minutes with mild to little symptom complaints in order

## 2021-08-03 ENCOUNTER — APPOINTMENT (OUTPATIENT)
Dept: PHYSICAL THERAPY | Age: 46
End: 2021-08-03
Payer: OTHER MISCELLANEOUS

## 2021-08-05 ENCOUNTER — APPOINTMENT (OUTPATIENT)
Dept: PHYSICAL THERAPY | Age: 46
End: 2021-08-05
Payer: OTHER MISCELLANEOUS

## 2021-08-10 ENCOUNTER — APPOINTMENT (OUTPATIENT)
Dept: PHYSICAL THERAPY | Age: 46
End: 2021-08-10
Payer: OTHER MISCELLANEOUS

## 2021-08-13 ENCOUNTER — TELEPHONE (OUTPATIENT)
Dept: SURGERY | Facility: CLINIC | Age: 46
End: 2021-08-13

## 2021-08-13 NOTE — TELEPHONE ENCOUNTER
Cam Pike, nurse , requested OV note dated 7.21.21 with Dr Chelo Stovall be faxed to her; faxed per request, confirmation received

## 2021-09-09 ENCOUNTER — TELEPHONE (OUTPATIENT)
Dept: SURGERY | Facility: CLINIC | Age: 46
End: 2021-09-09

## 2021-09-09 NOTE — TELEPHONE ENCOUNTER
Received Medical Records Request for 1/1/21 to Present. Original sent to Scan/Stat. Copy sent to scanning.

## 2021-09-27 ENCOUNTER — TELEPHONE (OUTPATIENT)
Dept: SURGERY | Facility: CLINIC | Age: 46
End: 2021-09-27

## 2021-09-27 NOTE — TELEPHONE ENCOUNTER
[facility]   Original sent to Scan Stat, copy sent to scanning

## 2022-04-11 NOTE — LETTER
Date: 321/2025    Patient Name: Bryant Mariscal          To Whom it may concern:    The above patient was seen at [facility] for treatment of a medical condition.    Patient can return to work in following restrictions:    No lifting, pushing, pulling, or carrying greater than 20 lbs with right hand.    Re-eval in 4 weeks.      Sincerely,      Jad Little MD   Hand, Wrist, & Elbow Surgery  [email or web address]  t: [phone]  f: [phone]        
3 = A little assistance

## 2022-06-29 ENCOUNTER — OCC HEALTH (OUTPATIENT)
Dept: OCCUPATIONAL MEDICINE | Age: 47
End: 2022-06-29
Attending: PHYSICIAN ASSISTANT

## 2022-06-29 ENCOUNTER — HOSPITAL ENCOUNTER (OUTPATIENT)
Dept: GENERAL RADIOLOGY | Age: 47
Discharge: HOME | End: 2022-06-29
Attending: PHYSICIAN ASSISTANT

## 2022-06-29 DIAGNOSIS — S46.911A ELBOW STRAIN, RIGHT, INITIAL ENCOUNTER: Primary | ICD-10-CM

## 2022-06-29 DIAGNOSIS — S46.911A ELBOW STRAIN, RIGHT, INITIAL ENCOUNTER: ICD-10-CM

## 2022-06-29 PROCEDURE — 73080 X-RAY EXAM OF ELBOW: CPT | Performed by: PHYSICIAN ASSISTANT

## 2022-07-07 ENCOUNTER — OFFICE VISIT (OUTPATIENT)
Dept: OCCUPATIONAL MEDICINE | Age: 47
End: 2022-07-07
Attending: PHYSICIAN ASSISTANT

## 2022-07-07 DIAGNOSIS — M77.01 MEDIAL EPICONDYLITIS OF RIGHT ELBOW: Primary | ICD-10-CM

## 2022-07-15 ENCOUNTER — OFFICE VISIT (OUTPATIENT)
Dept: OCCUPATIONAL MEDICINE | Age: 47
End: 2022-07-15
Attending: PHYSICIAN ASSISTANT

## 2022-07-15 DIAGNOSIS — M77.01 MEDIAL EPICONDYLITIS OF RIGHT ELBOW: Primary | ICD-10-CM

## 2022-07-21 ENCOUNTER — TELEPHONE (OUTPATIENT)
Dept: ORTHOPEDICS CLINIC | Facility: CLINIC | Age: 47
End: 2022-07-21

## 2022-07-21 NOTE — TELEPHONE ENCOUNTER
Tried contacting patient to schedule an appointment with Dr. Carolann Holter and or Hugo RAMIREZ for a [facility] right elbow injury. Information already entered and created. Please try calling patient to schedule an appointment pet [facility].

## 2022-07-25 ENCOUNTER — TELEPHONE (OUTPATIENT)
Dept: ORTHOPEDICS CLINIC | Facility: CLINIC | Age: 47
End: 2022-07-25

## 2022-07-25 NOTE — TELEPHONE ENCOUNTER
WC Patient is coming in for Rt shoulder injury. Patient has not had any imaging done. Please place X-ray order accordingly. Patient has been notified to come in earlier prior to appointment. Thank you.     Future Appointments   Date Time Provider Mendy Adamaris   8/18/2022  1:40 PM ASHLEY Llanos [facility]

## 2022-07-25 NOTE — TELEPHONE ENCOUNTER
Per Librado Maharaj and Dr. Garland Su this patient should be seen by either Idar or Dr. Tristian Goodwin

## 2022-07-25 NOTE — TELEPHONE ENCOUNTER
Future Appointments   Date Time Provider Mendy Bailon   8/18/2022  1:40 PM ASHLEY Etienne [facility]

## 2022-07-27 ENCOUNTER — OFFICE VISIT (OUTPATIENT)
Dept: ORTHOPEDICS CLINIC | Facility: CLINIC | Age: 47
End: 2022-07-27
Payer: OTHER MISCELLANEOUS

## 2022-07-27 ENCOUNTER — TELEPHONE (OUTPATIENT)
Dept: ORTHOPEDICS CLINIC | Facility: CLINIC | Age: 47
End: 2022-07-27

## 2022-07-27 DIAGNOSIS — M77.01 MEDIAL EPICONDYLITIS OF RIGHT ELBOW: Primary | ICD-10-CM

## 2022-07-27 PROCEDURE — 20610 DRAIN/INJ JOINT/BURSA W/O US: CPT | Performed by: ORTHOPAEDIC SURGERY

## 2022-07-27 PROCEDURE — 99204 OFFICE O/P NEW MOD 45 MIN: CPT | Performed by: ORTHOPAEDIC SURGERY

## 2022-07-27 RX ORDER — KETOROLAC TROMETHAMINE 30 MG/ML
30 INJECTION, SOLUTION INTRAMUSCULAR; INTRAVENOUS ONCE
Status: COMPLETED | OUTPATIENT
Start: 2022-07-27 | End: 2022-07-27

## 2022-07-27 RX ORDER — TRIAMCINOLONE ACETONIDE 40 MG/ML
40 INJECTION, SUSPENSION INTRA-ARTICULAR; INTRAMUSCULAR ONCE
Status: COMPLETED | OUTPATIENT
Start: 2022-07-27 | End: 2022-07-27

## 2022-07-27 RX ADMIN — TRIAMCINOLONE ACETONIDE 40 MG: 40 INJECTION, SUSPENSION INTRA-ARTICULAR; INTRAMUSCULAR at 11:41:00

## 2022-07-27 RX ADMIN — KETOROLAC TROMETHAMINE 30 MG: 30 INJECTION, SOLUTION INTRAMUSCULAR; INTRAVENOUS at 11:41:00

## 2022-07-27 NOTE — PROCEDURES
Right Elbow Joint Injection    Name: Ronda Peñaloza   MRN: [id number]  Date: 7/27/2022     Clinical Indications:   Medial Epicondylitis    After informed consent, the injection site was marked, sterilized with topical chlorhexidine antiseptic, and locally anesthetized with skin refrigerant. The patient was placed in a comfortable position and the elbow was exposed. Using sterile technique: 1 mL of 30mg/mL of Ketorolac, 1 mL of 0.5% Bupivicaine, 1 mL of 1% Lidocaine, and 1 mL of 40mg/mL Triamcinolone was injected with direct approach utilizing a 25 gauge needle. A band-aid was applied. The patient tolerated the procedure well. Disposition:   Continue with physical/occupational therapy and follow up in clinic if symptoms do not resolve in 8 weeks. Александр Ly. Deepak Gonzalez MD  Knee, Shoulder, & Elbow Surgery / Sports Medicine Specialist  [facility]  [place] [address], [address] [facility]. Adeel Ward. [email or web address]  t: [phone]  o: [phone]  f: [phone]

## 2022-07-27 NOTE — TELEPHONE ENCOUNTER
[facility] is requesting a copy of a PT order, for approval.    PT order can be faxed to [phone]. Please advise.

## 2022-08-08 ENCOUNTER — MED REC SCAN ONLY (OUTPATIENT)
Dept: ORTHOPEDICS CLINIC | Facility: CLINIC | Age: 47
End: 2022-08-08

## 2022-08-25 ENCOUNTER — OFFICE VISIT (OUTPATIENT)
Dept: ORTHOPEDICS CLINIC | Facility: CLINIC | Age: 47
End: 2022-08-25
Payer: OTHER MISCELLANEOUS

## 2022-08-25 DIAGNOSIS — M77.01 MEDIAL EPICONDYLITIS OF RIGHT ELBOW: Primary | ICD-10-CM

## 2022-08-25 PROCEDURE — 99214 OFFICE O/P EST MOD 30 MIN: CPT | Performed by: PHYSICIAN ASSISTANT

## 2022-08-26 ENCOUNTER — TELEPHONE (OUTPATIENT)
Dept: ORTHOPEDICS CLINIC | Facility: CLINIC | Age: 47
End: 2022-08-26

## 2022-08-26 NOTE — TELEPHONE ENCOUNTER
Pt called and notified his letter was sent to his [employer] account, and appt set for 9/7/22 with ASHLEY Eason to discuss the MRI results. Pt verbalized understanding. No further questions at this time. ----- Message from Tresea Denver, [address] sent at 8/26/2022  1:18 PM CDT -----  Team- can we please let patient know that I have placed an updated work note that outlines the specifics requested by his . He will need an appointment with me on September 7, 2022, can you please schedule this as well in it is okay to double book thank you.     Spenser Cota

## 2022-09-01 ENCOUNTER — TELEPHONE (OUTPATIENT)
Dept: ORTHOPEDICS CLINIC | Facility: CLINIC | Age: 47
End: 2022-09-01

## 2022-09-08 ENCOUNTER — MED REC SCAN ONLY (OUTPATIENT)
Dept: ORTHOPEDICS CLINIC | Facility: CLINIC | Age: 47
End: 2022-09-08

## 2022-09-14 ENCOUNTER — OFFICE VISIT (OUTPATIENT)
Dept: ORTHOPEDICS CLINIC | Facility: CLINIC | Age: 47
End: 2022-09-14
Payer: OTHER MISCELLANEOUS

## 2022-09-14 DIAGNOSIS — M77.01 MEDIAL EPICONDYLITIS OF RIGHT ELBOW: Primary | ICD-10-CM

## 2022-09-14 PROCEDURE — 99214 OFFICE O/P EST MOD 30 MIN: CPT | Performed by: PHYSICIAN ASSISTANT

## 2022-09-29 ENCOUNTER — TELEPHONE (OUTPATIENT)
Dept: ORTHOPEDICS CLINIC | Facility: CLINIC | Age: 47
End: 2022-09-29

## 2022-09-29 NOTE — TELEPHONE ENCOUNTER
Soledad Mcgrath from [facility] called to give an update on patient's condition. Patient was last seen for his RT Arm by Jory Gonzalez on 9/14/22. PT states patient's tendonitis is better, however the ulnar nerve is very irritated. PT would like a call back if provider needs to see patient or if there is any changes to treatment plan. PT stated she has also faxed over patient's progress on 9/26. Please advise, thank you.

## 2022-09-29 NOTE — TELEPHONE ENCOUNTER
Spoke to [facility] PT and informed of doctors recommendations below, [facility] verbalized understanding with intent to comply with treatment plan. No further questions or concerns at this time.

## 2022-10-14 ENCOUNTER — OFFICE VISIT (OUTPATIENT)
Dept: ORTHOPEDICS CLINIC | Facility: CLINIC | Age: 47
End: 2022-10-14
Payer: OTHER MISCELLANEOUS

## 2022-10-14 DIAGNOSIS — G56.21 ULNAR NEUROPATHY AT ELBOW OF RIGHT UPPER EXTREMITY: Primary | ICD-10-CM

## 2022-10-14 PROCEDURE — 99214 OFFICE O/P EST MOD 30 MIN: CPT | Performed by: ORTHOPAEDIC SURGERY

## 2022-10-25 ENCOUNTER — OFFICE VISIT (OUTPATIENT)
Dept: FAMILY MEDICINE CLINIC | Facility: CLINIC | Age: 47
End: 2022-10-25
Payer: COMMERCIAL

## 2022-10-25 VITALS
HEIGHT: 72 IN | OXYGEN SATURATION: 97 % | HEART RATE: 72 BPM | SYSTOLIC BLOOD PRESSURE: 120 MMHG | DIASTOLIC BLOOD PRESSURE: 80 MMHG | RESPIRATION RATE: 16 BRPM | WEIGHT: 315 LBS | BODY MASS INDEX: 42.66 KG/M2

## 2022-10-25 DIAGNOSIS — Z00.00 ANNUAL PHYSICAL EXAM: Primary | ICD-10-CM

## 2022-10-25 DIAGNOSIS — Z99.89 OSA ON CPAP: ICD-10-CM

## 2022-10-25 DIAGNOSIS — Z12.11 COLON CANCER SCREENING: ICD-10-CM

## 2022-10-25 DIAGNOSIS — Z12.5 SCREENING FOR PROSTATE CANCER: ICD-10-CM

## 2022-10-25 DIAGNOSIS — I10 ESSENTIAL HYPERTENSION: ICD-10-CM

## 2022-10-25 DIAGNOSIS — G47.33 OSA ON CPAP: ICD-10-CM

## 2022-10-25 DIAGNOSIS — Z00.00 ROUTINE GENERAL MEDICAL EXAMINATION AT HEALTH CARE FACILITY: ICD-10-CM

## 2022-10-25 DIAGNOSIS — E66.01 SEVERE OBESITY (BMI >= 40) (HCC): ICD-10-CM

## 2022-10-25 DIAGNOSIS — N52.9 ERECTILE DYSFUNCTION, UNSPECIFIED ERECTILE DYSFUNCTION TYPE: ICD-10-CM

## 2022-10-25 PROCEDURE — 3079F DIAST BP 80-89 MM HG: CPT | Performed by: FAMILY MEDICINE

## 2022-10-25 PROCEDURE — 3074F SYST BP LT 130 MM HG: CPT | Performed by: FAMILY MEDICINE

## 2022-10-25 PROCEDURE — 99396 PREV VISIT EST AGE 40-64: CPT | Performed by: FAMILY MEDICINE

## 2022-10-25 PROCEDURE — 3008F BODY MASS INDEX DOCD: CPT | Performed by: FAMILY MEDICINE

## 2022-10-25 RX ORDER — TADALAFIL 10 MG/1
10 TABLET ORAL
Qty: 20 TABLET | Refills: 3 | Status: SHIPPED | OUTPATIENT
Start: 2022-10-25

## 2022-10-29 ENCOUNTER — LABORATORY ENCOUNTER (OUTPATIENT)
Dept: LAB | Age: 47
End: 2022-10-29
Attending: FAMILY MEDICINE
Payer: COMMERCIAL

## 2022-10-29 DIAGNOSIS — Z00.00 ROUTINE GENERAL MEDICAL EXAMINATION AT HEALTH CARE FACILITY: ICD-10-CM

## 2022-10-29 DIAGNOSIS — Z12.5 SCREENING FOR PROSTATE CANCER: ICD-10-CM

## 2022-10-29 LAB
ALBUMIN SERPL-MCNC: 3.9 G/DL (ref 3.4–5)
ALBUMIN/GLOB SERPL: 1.3 {RATIO} (ref 1–2)
ALP LIVER SERPL-CCNC: 66 U/L
ALT SERPL-CCNC: 89 U/L
ANION GAP SERPL CALC-SCNC: 4 MMOL/L (ref 0–18)
AST SERPL-CCNC: 38 U/L (ref 15–37)
BASOPHILS # BLD AUTO: 0.04 X10(3) UL (ref 0–0.2)
BASOPHILS NFR BLD AUTO: 0.5 %
BILIRUB SERPL-MCNC: 1.1 MG/DL (ref 0.1–2)
BUN BLD-MCNC: 18 MG/DL (ref 7–18)
CALCIUM BLD-MCNC: 9 MG/DL (ref 8.5–10.1)
CHLORIDE SERPL-SCNC: 110 MMOL/L (ref 98–112)
CHOLEST SERPL-MCNC: 164 MG/DL (ref ?–200)
CO2 SERPL-SCNC: 27 MMOL/L (ref 21–32)
COMPLEXED PSA SERPL-MCNC: 1.81 NG/ML (ref ?–4)
CREAT BLD-MCNC: 1.09 MG/DL
EOSINOPHIL # BLD AUTO: 0.2 X10(3) UL (ref 0–0.7)
EOSINOPHIL NFR BLD AUTO: 2.5 %
ERYTHROCYTE [DISTWIDTH] IN BLOOD BY AUTOMATED COUNT: 12.6 %
FASTING PATIENT LIPID ANSWER: YES
FASTING STATUS PATIENT QL REPORTED: YES
GFR SERPLBLD BASED ON 1.73 SQ M-ARVRAT: 84 ML/MIN/1.73M2 (ref 60–?)
GLOBULIN PLAS-MCNC: 3 G/DL (ref 2.8–4.4)
GLUCOSE BLD-MCNC: 147 MG/DL (ref 70–99)
HCT VFR BLD AUTO: 42.9 %
HDLC SERPL-MCNC: 36 MG/DL (ref 40–59)
HGB BLD-MCNC: 14.3 G/DL
IMM GRANULOCYTES # BLD AUTO: 0.02 X10(3) UL (ref 0–1)
IMM GRANULOCYTES NFR BLD: 0.3 %
LDLC SERPL CALC-MCNC: 91 MG/DL (ref ?–100)
LYMPHOCYTES # BLD AUTO: 3.46 X10(3) UL (ref 1–4)
LYMPHOCYTES NFR BLD AUTO: 43.9 %
MCH RBC QN AUTO: 30.6 PG (ref 26–34)
MCHC RBC AUTO-ENTMCNC: 33.3 G/DL (ref 31–37)
MCV RBC AUTO: 91.9 FL
MONOCYTES # BLD AUTO: 0.77 X10(3) UL (ref 0.1–1)
MONOCYTES NFR BLD AUTO: 9.8 %
NEUTROPHILS # BLD AUTO: 3.4 X10 (3) UL (ref 1.5–7.7)
NEUTROPHILS # BLD AUTO: 3.4 X10(3) UL (ref 1.5–7.7)
NEUTROPHILS NFR BLD AUTO: 43 %
NONHDLC SERPL-MCNC: 128 MG/DL (ref ?–130)
OSMOLALITY SERPL CALC.SUM OF ELEC: 297 MOSM/KG (ref 275–295)
PLATELET # BLD AUTO: 225 10(3)UL (ref 150–450)
POTASSIUM SERPL-SCNC: 4.5 MMOL/L (ref 3.5–5.1)
PROT SERPL-MCNC: 6.9 G/DL (ref 6.4–8.2)
RBC # BLD AUTO: 4.67 X10(6)UL
SODIUM SERPL-SCNC: 141 MMOL/L (ref 136–145)
TRIGL SERPL-MCNC: 217 MG/DL (ref 30–149)
TSI SER-ACNC: 1.93 MIU/ML (ref 0.36–3.74)
VLDLC SERPL CALC-MCNC: 35 MG/DL (ref 0–30)
WBC # BLD AUTO: 7.9 X10(3) UL (ref 4–11)

## 2022-10-29 PROCEDURE — 84443 ASSAY THYROID STIM HORMONE: CPT

## 2022-10-29 PROCEDURE — 85025 COMPLETE CBC W/AUTO DIFF WBC: CPT

## 2022-10-29 PROCEDURE — 80053 COMPREHEN METABOLIC PANEL: CPT

## 2022-10-29 PROCEDURE — 80061 LIPID PANEL: CPT

## 2022-10-29 PROCEDURE — 36415 COLL VENOUS BLD VENIPUNCTURE: CPT

## 2022-11-04 ENCOUNTER — TELEPHONE (OUTPATIENT)
Dept: FAMILY MEDICINE CLINIC | Facility: CLINIC | Age: 47
End: 2022-11-04

## 2022-11-04 DIAGNOSIS — R73.9 ELEVATED BLOOD SUGAR: Primary | ICD-10-CM

## 2022-11-04 NOTE — TELEPHONE ENCOUNTER
----- Message from Alis Henao MD sent at 11/4/2022 10:01 AM CDT -----  Sugars are running high, advise on continued low carb diet and aerobic exercise, needs diabetic labs in 6 months.

## 2022-11-08 ENCOUNTER — TELEPHONE (OUTPATIENT)
Dept: ORTHOPEDICS CLINIC | Facility: CLINIC | Age: 47
End: 2022-11-08

## 2022-11-08 DIAGNOSIS — G56.21 ULNAR NEUROPATHY AT ELBOW OF RIGHT UPPER EXTREMITY: Primary | ICD-10-CM

## 2022-11-08 NOTE — TELEPHONE ENCOUNTER
[facility] Hospitalist Daily Progress Note       SUBJECTIVE    57 year old male with a PMHx of HTN, HLD, alcoholism who presented to the ED yesterday (9/24/21) to be evaluated for abdominal pain that began on 9/3/2021 following a colonoscopy and EGD.  He was later found to have massive ascites.  Paracentesis was performed in ER with 4.8 Liters removed and then 5 L a second time, followed by 6 L on 10/2.  Had another 6 Liters removed today.    Patient much less distended today.  Oxycodone is helping his pain.  Has been afebrile.      OBJECTIVE    I/O's    Intake/Output Summary (Last 24 hours) at 10/5/2021 1802  Last data filed at 10/5/2021 1500  Gross per 24 hour   Intake 360 ml   Output 400 ml   Net -40 ml       Last Recorded Vitals  Temp:  [98.2 °F (36.8 °C)-99 °F (37.2 °C)] 98.2 °F (36.8 °C)  Heart Rate:  [85-90] 90  Resp:  [16-18] 17  BP: (125-150)/(67-89) 132/74   Body mass index is 26.01 kg/m².    Review of Systems     Review of Systems     Constitutional: Positive for activity change, appetite change, fatigue and unexpected weight change - improved. Negative for chills and fever.   HENT: Negative.    Eyes: Negative.    Respiratory: Negative.    Cardiovascular: Negative.    Gastrointestinal: Positive for abdominal distention worse today, abdominal pain, constipation and nausea - improved. Negative for blood in stool, diarrhea and vomiting.   Endocrine: Negative.    Genitourinary: Negative.    Musculoskeletal: Negative.    Allergic/Immunologic: Negative.    Neurological: Negative for dizziness, syncope, weakness, light-headedness and headaches.   Hematological: Negative.    Psychiatric/Behavioral: Negative.           Physical Exam     Physical Exam     Vitals reviewed.   Constitutional:       General: He is not in acute distress.     Appearance: He is not ill-appearing or diaphoretic.   HENT:      Head: Atraumatic.      Mouth/Throat:      Mouth: Mucous membranes are moist.      Pharynx:  LOV 10/14/22  No future appointments. Pt called for EMG order. Pended, please review is correct and add \"type of EMG\". To call pt to update. 10/14/22 OV note: \"He is experiencing symptoms of ulnar neuropathy at the elbow for which I feel it is important to obtain EMG/nerve conduction velocity study to further document the area of compression. \" Oropharynx is clear.   Eyes:      Extraocular Movements: Extraocular movements intact.      Conjunctiva/sclera: Conjunctivae normal.      Pupils: Pupils are equal, round, and reactive to light.   Cardiovascular:      Rate and Rhythm: Normal rate and regular rhythm.      Pulses: Normal pulses.      Heart sounds: Normal heart sounds.   Pulmonary:      Effort: Pulmonary effort is normal.      Breath sounds: Normal breath sounds.   Abdominal:      General: There is some distention but soft - no changes     Tenderness: There is abdominal tenderness. There is no guarding or rebound.   Musculoskeletal:         General: Normal range of motion.      Cervical back: Neck supple.   Skin:     General: Skin is warm and dry.   Neurological:      General: No focal deficit present.      Mental Status: He is alert and oriented to person, place, and time.   Psychiatric: calm    Labs       Recent Results (from the past 24 hour(s))   Prothrombin Time    Collection Time: 10/05/21  4:13 AM   Result Value Ref Range    Prothrombin Time 15.0 (H) 9.7 - 11.8 sec    INR 1.5     Gold Top    Collection Time: 10/05/21  4:14 AM   Result Value Ref Range    Extra Tube Hold for Add Ons    Lavender Top    Collection Time: 10/05/21  4:14 AM   Result Value Ref Range    Extra Tube Hold for Add Ons        Imaging     CT ABDOMEN PELVIS W CONTRAST - Oral and  IV contrast    1.  Interval development of massive ascites. 2.  Hepatic steatosis. 3.  Nonspecific thickening of the colon with differential including infectious versus inflammatory versus ischemic etiologies.  Recommend direct visualization once clinically able. 4.  Coronary artery atherosclerotic calcification.  Findings are called to Dr. Braden in the emergency room at 3:18 PM on 09/24/2021. Electronically Signed by: ANGEL ROMERO M.D. Signed on: 9/24/2021 3:19 PM      XR CHEST PA OR AP 1 VIEW     Result Date: 9/24/2021  EXAM: XR CHEST PA OR AP 1 VIEW CLINICAL INDICATION: Shortness of breath and  abdominal pain COMPARISON: 07/16/2019 FINDINGS: Study is limited due to a poor inspiratory effort.  The heart and pulmonary vessels are normal.  The lungs are clear.  No pneumothorax. Patient has undergone prior cervical spine fusion.      Limited study although no radiographic evidence for pneumonia. Electronically Signed by: ANGEL ROMERO M.D. Signed on: 9/24/2021 1:18 PM      US GUIDED PARACENTESIS     Result Date: 9/25/2021       1.    Successful ultrasound-guided paracentesis. Electronically Signed by: OZZIE QUARLES M.D. Signed on: 9/25/2021 5:46 AM     Assessment and Plan       Tense Symptomatic Ascites secondary to liver cirrhosis -- likely secondary to portal hypertension due to alcoholic liver disease.  IR consulted and paracentesis performed with 4.8 L removed.  .  Repeat ultrasound for ascites screen -> recurrence of large ascites.  GI consulted.   Spironolactone increased to 100 mg daily. Low salt diet. On Rocephin.  Repeat U/S guided paracentesis completed 9/27 with 5 liters fluid removed .  Will likely continue to accumulate due to portal HTN.  Portal doppler ultrasound ordered by GI to r/o Budd-Chiari syndrome - WNL  -Repeat ultrasound confirmed moderate to large ascites.  Had repeat u/s guided paracentesis on 10/2 w/ 6L removed.  Planned for another drainage with albumin infusion for BP support -> 6 more Liters removed -> BP was maintained thus no albumin needed.    Hypokalemia  Monitor closely pt is also on aldactone   Potassium 3.6  Today.  Will give daily oral 20 mEq K+    Hypomagnesemia -  give IV magnesium sulfate per replacement protocols     Hypoalbuminemia --severe, secondary to hepatic failure and malnutrtion    Hypertension -- BP has been stable on home regimen of Norvasc 10 mg daily     Alcohol Abuse -- patient is ready to quit; states no drink in last 9 days.  Needs formal support program to remain abstinent.   Receiving Thiamine, Folic acid.   Cont. CIWA protocol if needed for withdrawal  symptoms  Has been in various AA groups but believes he can abstain from alcohol on his own this time.  We discussed importance of having additional support.     Gastritis, Esophagitis -- likely secondary to alcohol use.  GI on consult.  Recommend PPI    Functional Weakness - Patient working with PT - very unsteady. Recommending SNF - patient awaiting placement.  Has reached out to [facility], but no beds.  Now pursuing [facility]     Disposition -- discussed with patient about functional status - will likely need rehab.  SW is making arrangements for placement to SNF.  Patient and wife are hoping for [facility].    DVT Prophylaxis  SCDs    Smoking Cessation  Ready to quit: Not Answered  Counseling given: Not Answered         BRUNO Cortez MD  Hospitalist  [facility]  24 Hr Hospitalist Service with Same Physician Continuity of Care  [phone] [facility] 24hr Answering Service

## 2022-11-08 NOTE — TELEPHONE ENCOUNTER
Good morning,   Pt called to check on status of his EMG. EMG was not ordered; Please enter RX for this patient.

## 2022-11-09 NOTE — TELEPHONE ENCOUNTER
Correction to my earlier note. EMG had already been ordered at earlier visit. For pt to schedule, to call Dr Sneha Stevens office at [phone]. Left detailed msg for pt. To call back if any questions. No-Patient/Caregiver offered and refused free interpretation services.

## 2022-11-10 ENCOUNTER — HOSPITAL ENCOUNTER (OUTPATIENT)
Age: 47
Discharge: HOME | End: 2022-11-10
Payer: COMMERCIAL

## 2022-11-10 VITALS
HEIGHT: 72 IN | RESPIRATION RATE: 18 BRPM | HEART RATE: 66 BPM | BODY MASS INDEX: 42.66 KG/M2 | SYSTOLIC BLOOD PRESSURE: 156 MMHG | DIASTOLIC BLOOD PRESSURE: 96 MMHG | TEMPERATURE: 98 F | OXYGEN SATURATION: 98 % | WEIGHT: 315 LBS

## 2022-11-10 DIAGNOSIS — J11.1 INFLUENZA: ICD-10-CM

## 2022-11-10 DIAGNOSIS — H66.41 SUPPURATIVE OTITIS MEDIA OF RIGHT EAR, UNSPECIFIED CHRONICITY: Primary | ICD-10-CM

## 2022-11-10 DIAGNOSIS — H60.501 ACUTE OTITIS EXTERNA OF RIGHT EAR, UNSPECIFIED TYPE: ICD-10-CM

## 2022-11-10 PROCEDURE — 99213 OFFICE O/P EST LOW 20 MIN: CPT

## 2022-11-10 RX ORDER — NEOMYCIN SULFATE, POLYMYXIN B SULFATE, HYDROCORTISONE 3.5; 10000; 1 MG/ML; [USP'U]/ML; MG/ML
3 SOLUTION/ DROPS AURICULAR (OTIC) 3 TIMES DAILY
Qty: 1 EACH | Refills: 0 | Status: SHIPPED | OUTPATIENT
Start: 2022-11-10 | End: 2022-11-20

## 2022-11-10 RX ORDER — DEXAMETHASONE 4 MG/1
16 TABLET ORAL ONCE
Status: COMPLETED | OUTPATIENT
Start: 2022-11-10 | End: 2022-11-10

## 2022-11-10 RX ORDER — AMOXICILLIN AND CLAVULANATE POTASSIUM 875; 125 MG/1; MG/1
1 TABLET, FILM COATED ORAL 2 TIMES DAILY
Qty: 20 TABLET | Refills: 0 | Status: SHIPPED | OUTPATIENT
Start: 2022-11-10 | End: 2022-11-20

## 2022-11-10 RX ORDER — PREDNISONE 20 MG/1
40 TABLET ORAL DAILY
Qty: 6 TABLET | Refills: 0 | Status: SHIPPED | OUTPATIENT
Start: 2022-11-10 | End: 2022-11-13

## 2022-11-10 NOTE — ED INITIAL ASSESSMENT (HPI)
C/o dry cough since Monday with right ear pain, sore throat and fever-tmax 103 yesterday. Daughter is positive for Influenza A on Saturday.

## 2022-11-10 NOTE — DISCHARGE INSTRUCTIONS
Please return to the ER/clinic if symptoms worsen. Follow-up with your PCP in 24-48 hours as needed. The decadron will work in your system the next several days. Will write for symptom additional prednisone to start on day 2 or 3 if symptoms persist.  Recommend an over-the-counter antihistamine daily i.e. Zyrtec or Claritin. Take the full course of antibiotics as prescribed in tandem with a probiotic daily. Your inhaler every 4-6 hours as needed. Follow the Dash dietary plan and log your blood pressures at different intervals. Use full course eardrops as prescribed.   Symptoms persist or worsen make a follow-up appointment with the valuation treatment

## 2022-11-15 ENCOUNTER — TELEPHONE (OUTPATIENT)
Dept: ORTHOPEDICS CLINIC | Facility: CLINIC | Age: 47
End: 2022-11-15

## 2022-11-15 NOTE — TELEPHONE ENCOUNTER
Spoke to patient and told him I would fax the order (referrals are not generated for EMG for us to authorize) to his  case - patient verbalized understanding

## 2022-11-15 NOTE — TELEPHONE ENCOUNTER
Patient called stating that his Workman's comp has not received any information regarding EMG order from our office. Patient would like an update on this. Patient can be reached at [phone].

## 2022-12-20 NOTE — TELEPHONE ENCOUNTER
Patient has been vomiting since 4am, 8 times along loose BM's that are somewhat normal, just having more than usual BM's. She is taking pantoprazole and recently had entyvio.   No fever, not able to hold liquids down. Does not have meds at home for nausea, vomiting or abdominal pain. She stated that her intestines hurt, believes she is having a crohn's flare. Asking what should she do for her symptoms?  Thank you   Pt was seen today with Dr. Guerline Hernandez for a Hospital follow up. This is a workman's comp situation, and pt brought paperwork for us to fill out for FMLA. Pt was given FELTON to sign, and was it was completed in office.   Paperwork was placed in RN station [facility]

## 2022-12-21 ENCOUNTER — OFFICE VISIT (OUTPATIENT)
Dept: ORTHOPEDICS CLINIC | Facility: CLINIC | Age: 47
End: 2022-12-21
Payer: OTHER MISCELLANEOUS

## 2022-12-21 DIAGNOSIS — G56.21 ULNAR NEUROPATHY AT ELBOW OF RIGHT UPPER EXTREMITY: Primary | ICD-10-CM

## 2022-12-21 PROCEDURE — 99212 OFFICE O/P EST SF 10 MIN: CPT | Performed by: ORTHOPAEDIC SURGERY

## 2022-12-29 ENCOUNTER — MED REC SCAN ONLY (OUTPATIENT)
Dept: ORTHOPEDICS CLINIC | Facility: CLINIC | Age: 47
End: 2022-12-29

## 2023-01-23 ENCOUNTER — TELEPHONE (OUTPATIENT)
Dept: ORTHOPEDICS CLINIC | Facility: CLINIC | Age: 48
End: 2023-01-23

## 2023-01-23 ENCOUNTER — OFFICE VISIT (OUTPATIENT)
Dept: ORTHOPEDICS CLINIC | Facility: CLINIC | Age: 48
End: 2023-01-23
Payer: OTHER MISCELLANEOUS

## 2023-01-23 DIAGNOSIS — M77.01 MEDIAL EPICONDYLITIS, RIGHT ELBOW: Primary | ICD-10-CM

## 2023-01-23 DIAGNOSIS — G56.21 CUBITAL TUNNEL SYNDROME ON RIGHT: ICD-10-CM

## 2023-01-23 NOTE — PROGRESS NOTES
OR BOOKING SHEET   Name: Vi Londono  MRN: [id number]   : 1975  Surgical Consent:  Right elbow common flexor tendon debridement and repair, right cubital tunnel release  Diagnosis:   (M77.01) Medial epicondylitis, right elbow  (primary encounter diagnosis)  (G56.21) Cubital tunnel syndrome on right   Laterality:   Right  Procedure Codes:  Cubital Tunnel Release (28137) and Medial epicondyle debridement w/ tendon repair (CPT 78164)  Disposition:  Outpatient  Operative Time:  1.5 hrs  Antibiotics:  Ancef 3g  Anesthesia Type:  Regional  Clearance:   NONE  Equipment:  Medial Epicondyle Debridement: Sterile Tourniquet, Mini C-arm, Wells blade, [employer] JuggerKnot Suture Kipton 1.45 mm Short Rigid,  Small Power, Small Mallet, 3-0 Monocryl, 4-0 Monocryl, Skin Glue, Steri-Strips, and 5 x 30 Ortho-Glass, Two 3 inch Ace wraps, One 2 inch Ace wrap, sling   Positioning:  Supine with arm table on OR table  Assistant request:   Assistant: Elo Guerra PA-C  Follow Up    7-10 days post op with Marcos Leger    Other:     Do not shave or clip arm hair

## 2023-01-27 ENCOUNTER — TELEPHONE (OUTPATIENT)
Dept: ORTHOPEDICS CLINIC | Facility: CLINIC | Age: 48
End: 2023-01-27

## 2023-03-06 RX ORDER — SCOLOPAMINE TRANSDERMAL SYSTEM 1 MG/1
1 PATCH, EXTENDED RELEASE TRANSDERMAL ONCE
OUTPATIENT
Start: 2023-03-06 | End: 2023-03-06

## 2023-03-15 ENCOUNTER — ANESTHESIA (OUTPATIENT)
Dept: SURGERY | Facility: HOSPITAL | Age: 48
End: 2023-03-15
Payer: OTHER MISCELLANEOUS

## 2023-03-15 ENCOUNTER — HOSPITAL ENCOUNTER (OUTPATIENT)
Facility: HOSPITAL | Age: 48
Setting detail: HOSPITAL OUTPATIENT SURGERY
Discharge: HOME | End: 2023-03-15
Attending: ORTHOPAEDIC SURGERY | Admitting: ORTHOPAEDIC SURGERY
Payer: OTHER MISCELLANEOUS

## 2023-03-15 ENCOUNTER — ANESTHESIA EVENT (OUTPATIENT)
Dept: SURGERY | Facility: HOSPITAL | Age: 48
End: 2023-03-15
Payer: OTHER MISCELLANEOUS

## 2023-03-15 ENCOUNTER — TELEPHONE (OUTPATIENT)
Dept: ORTHOPEDICS CLINIC | Facility: CLINIC | Age: 48
End: 2023-03-15

## 2023-03-15 VITALS
WEIGHT: 315 LBS | BODY MASS INDEX: 42.66 KG/M2 | TEMPERATURE: 97 F | SYSTOLIC BLOOD PRESSURE: 156 MMHG | RESPIRATION RATE: 16 BRPM | HEIGHT: 72 IN | DIASTOLIC BLOOD PRESSURE: 84 MMHG | OXYGEN SATURATION: 96 % | HEART RATE: 76 BPM

## 2023-03-15 DIAGNOSIS — M77.01 GOLFER'S ELBOW, RIGHT: Primary | ICD-10-CM

## 2023-03-15 PROCEDURE — 0RNLXZZ RELEASE RIGHT ELBOW JOINT, EXTERNAL APPROACH: ICD-10-PCS | Performed by: ORTHOPAEDIC SURGERY

## 2023-03-15 PROCEDURE — 76942 ECHO GUIDE FOR BIOPSY: CPT | Performed by: ANESTHESIOLOGY

## 2023-03-15 DEVICE — IMPLANTABLE DEVICE
Type: IMPLANTABLE DEVICE | Site: ELBOW | Status: FUNCTIONAL
Brand: JUGGERKNOT SOFT ANCHORS

## 2023-03-15 RX ORDER — HYDROCODONE BITARTRATE AND ACETAMINOPHEN 5; 325 MG/1; MG/1
1 TABLET ORAL ONCE AS NEEDED
Status: DISCONTINUED | OUTPATIENT
Start: 2023-03-15 | End: 2023-03-15

## 2023-03-15 RX ORDER — KETAMINE HYDROCHLORIDE 50 MG/ML
INJECTION, SOLUTION, CONCENTRATE INTRAMUSCULAR; INTRAVENOUS AS NEEDED
Status: DISCONTINUED | OUTPATIENT
Start: 2023-03-15 | End: 2023-03-15 | Stop reason: SURG

## 2023-03-15 RX ORDER — TRANEXAMIC ACID 10 MG/ML
INJECTION, SOLUTION INTRAVENOUS AS NEEDED
Status: DISCONTINUED | OUTPATIENT
Start: 2023-03-15 | End: 2023-03-15 | Stop reason: SURG

## 2023-03-15 RX ORDER — CEFAZOLIN SODIUM IN 0.9 % NACL 3 G/100 ML
3 INTRAVENOUS SOLUTION, PIGGYBACK (ML) INTRAVENOUS ONCE
Status: COMPLETED | OUTPATIENT
Start: 2023-03-15 | End: 2023-03-15

## 2023-03-15 RX ORDER — LIDOCAINE HYDROCHLORIDE AND EPINEPHRINE 10; 10 MG/ML; UG/ML
INJECTION, SOLUTION INFILTRATION; PERINEURAL AS NEEDED
Status: DISCONTINUED | OUTPATIENT
Start: 2023-03-15 | End: 2023-03-15 | Stop reason: HOSPADM

## 2023-03-15 RX ORDER — ACETAMINOPHEN 500 MG
1000 TABLET ORAL ONCE AS NEEDED
Status: DISCONTINUED | OUTPATIENT
Start: 2023-03-15 | End: 2023-03-15

## 2023-03-15 RX ORDER — GLYCOPYRROLATE 0.2 MG/ML
INJECTION, SOLUTION INTRAMUSCULAR; INTRAVENOUS AS NEEDED
Status: DISCONTINUED | OUTPATIENT
Start: 2023-03-15 | End: 2023-03-15 | Stop reason: SURG

## 2023-03-15 RX ORDER — HYDROMORPHONE HYDROCHLORIDE 1 MG/ML
0.2 INJECTION, SOLUTION INTRAMUSCULAR; INTRAVENOUS; SUBCUTANEOUS EVERY 5 MIN PRN
Status: DISCONTINUED | OUTPATIENT
Start: 2023-03-15 | End: 2023-03-15

## 2023-03-15 RX ORDER — DEXAMETHASONE SODIUM PHOSPHATE 10 MG/ML
INJECTION, SOLUTION INTRAMUSCULAR; INTRAVENOUS AS NEEDED
Status: DISCONTINUED | OUTPATIENT
Start: 2023-03-15 | End: 2023-03-15 | Stop reason: SURG

## 2023-03-15 RX ORDER — NALOXONE HYDROCHLORIDE 0.4 MG/ML
80 INJECTION, SOLUTION INTRAMUSCULAR; INTRAVENOUS; SUBCUTANEOUS AS NEEDED
Status: DISCONTINUED | OUTPATIENT
Start: 2023-03-15 | End: 2023-03-15

## 2023-03-15 RX ORDER — ONDANSETRON 4 MG/1
4 TABLET, ORALLY DISINTEGRATING ORAL EVERY 4 HOURS PRN
Qty: 10 TABLET | Refills: 1 | Status: SHIPPED | OUTPATIENT
Start: 2023-03-15

## 2023-03-15 RX ORDER — SODIUM CHLORIDE, SODIUM LACTATE, POTASSIUM CHLORIDE, CALCIUM CHLORIDE 600; 310; 30; 20 MG/100ML; MG/100ML; MG/100ML; MG/100ML
INJECTION, SOLUTION INTRAVENOUS CONTINUOUS
Status: DISCONTINUED | OUTPATIENT
Start: 2023-03-15 | End: 2023-03-15

## 2023-03-15 RX ORDER — MIDAZOLAM HYDROCHLORIDE 1 MG/ML
1 INJECTION INTRAMUSCULAR; INTRAVENOUS EVERY 5 MIN PRN
Status: DISCONTINUED | OUTPATIENT
Start: 2023-03-15 | End: 2023-03-15

## 2023-03-15 RX ORDER — PROCHLORPERAZINE EDISYLATE 5 MG/ML
5 INJECTION INTRAMUSCULAR; INTRAVENOUS EVERY 8 HOURS PRN
Status: DISCONTINUED | OUTPATIENT
Start: 2023-03-15 | End: 2023-03-15

## 2023-03-15 RX ORDER — ONDANSETRON 2 MG/ML
4 INJECTION INTRAMUSCULAR; INTRAVENOUS EVERY 6 HOURS PRN
Status: DISCONTINUED | OUTPATIENT
Start: 2023-03-15 | End: 2023-03-15

## 2023-03-15 RX ORDER — TRAMADOL HYDROCHLORIDE 50 MG/1
50 TABLET ORAL EVERY 6 HOURS PRN
Qty: 30 TABLET | Refills: 1 | Status: SHIPPED | OUTPATIENT
Start: 2023-03-15

## 2023-03-15 RX ORDER — ROPIVACAINE HYDROCHLORIDE 5 MG/ML
INJECTION, SOLUTION EPIDURAL; INFILTRATION; PERINEURAL AS NEEDED
Status: DISCONTINUED | OUTPATIENT
Start: 2023-03-15 | End: 2023-03-15 | Stop reason: SURG

## 2023-03-15 RX ORDER — HYDROCODONE BITARTRATE AND ACETAMINOPHEN 5; 325 MG/1; MG/1
2 TABLET ORAL ONCE AS NEEDED
Status: DISCONTINUED | OUTPATIENT
Start: 2023-03-15 | End: 2023-03-15

## 2023-03-15 RX ORDER — HYDROMORPHONE HYDROCHLORIDE 1 MG/ML
0.6 INJECTION, SOLUTION INTRAMUSCULAR; INTRAVENOUS; SUBCUTANEOUS EVERY 5 MIN PRN
Status: DISCONTINUED | OUTPATIENT
Start: 2023-03-15 | End: 2023-03-15

## 2023-03-15 RX ORDER — MIDAZOLAM HYDROCHLORIDE 1 MG/ML
INJECTION INTRAMUSCULAR; INTRAVENOUS AS NEEDED
Status: DISCONTINUED | OUTPATIENT
Start: 2023-03-15 | End: 2023-03-15 | Stop reason: SURG

## 2023-03-15 RX ORDER — HYDROMORPHONE HYDROCHLORIDE 1 MG/ML
0.4 INJECTION, SOLUTION INTRAMUSCULAR; INTRAVENOUS; SUBCUTANEOUS EVERY 5 MIN PRN
Status: DISCONTINUED | OUTPATIENT
Start: 2023-03-15 | End: 2023-03-15

## 2023-03-15 RX ORDER — DEXAMETHASONE SODIUM PHOSPHATE 4 MG/ML
VIAL (ML) INJECTION AS NEEDED
Status: DISCONTINUED | OUTPATIENT
Start: 2023-03-15 | End: 2023-03-15 | Stop reason: SURG

## 2023-03-15 RX ORDER — ACETAMINOPHEN 500 MG
1000 TABLET ORAL ONCE
Status: DISCONTINUED | OUTPATIENT
Start: 2023-03-15 | End: 2023-03-15 | Stop reason: HOSPADM

## 2023-03-15 RX ORDER — CEFAZOLIN SODIUM IN 0.9 % NACL 3 G/100 ML
INTRAVENOUS SOLUTION, PIGGYBACK (ML) INTRAVENOUS
Status: DISCONTINUED
Start: 2023-03-15 | End: 2023-03-15

## 2023-03-15 RX ADMIN — DEXAMETHASONE SODIUM PHOSPHATE 4 MG: 4 MG/ML VIAL (ML) INJECTION at 11:31:00

## 2023-03-15 RX ADMIN — MIDAZOLAM HYDROCHLORIDE 2 MG: 1 INJECTION INTRAMUSCULAR; INTRAVENOUS at 11:19:00

## 2023-03-15 RX ADMIN — TRANEXAMIC ACID 1000 MG: 10 INJECTION, SOLUTION INTRAVENOUS at 11:32:00

## 2023-03-15 RX ADMIN — SODIUM CHLORIDE, SODIUM LACTATE, POTASSIUM CHLORIDE, CALCIUM CHLORIDE: 600; 310; 30; 20 INJECTION, SOLUTION INTRAVENOUS at 13:21:00

## 2023-03-15 RX ADMIN — DEXAMETHASONE SODIUM PHOSPHATE 2 MG: 10 INJECTION, SOLUTION INTRAMUSCULAR; INTRAVENOUS at 11:27:00

## 2023-03-15 RX ADMIN — ROPIVACAINE HYDROCHLORIDE 30 ML: 5 INJECTION, SOLUTION EPIDURAL; INFILTRATION; PERINEURAL at 11:27:00

## 2023-03-15 RX ADMIN — KETAMINE HYDROCHLORIDE 25 MG: 50 INJECTION, SOLUTION, CONCENTRATE INTRAMUSCULAR; INTRAVENOUS at 11:34:00

## 2023-03-15 RX ADMIN — SODIUM CHLORIDE, SODIUM LACTATE, POTASSIUM CHLORIDE, CALCIUM CHLORIDE: 600; 310; 30; 20 INJECTION, SOLUTION INTRAVENOUS at 11:19:00

## 2023-03-15 RX ADMIN — CEFAZOLIN SODIUM IN 0.9 % NACL 3 G: 3 G/100 ML INTRAVENOUS SOLUTION, PIGGYBACK (ML) INTRAVENOUS at 11:28:00

## 2023-03-15 RX ADMIN — GLYCOPYRROLATE 0.2 MG: 0.2 INJECTION, SOLUTION INTRAMUSCULAR; INTRAVENOUS at 11:31:00

## 2023-03-15 RX ADMIN — SODIUM CHLORIDE, SODIUM LACTATE, POTASSIUM CHLORIDE, CALCIUM CHLORIDE: 600; 310; 30; 20 INJECTION, SOLUTION INTRAVENOUS at 12:51:00

## 2023-03-15 NOTE — OPERATIVE REPORT
Operative Note    Patient Name: Jacque Marin    Preoperative Diagnosis:     1. Medial epicondylitis, right elbow [M77.01]  2. Cubital tunnel syndrome on right [G56.21]    Postoperative Diagnosis:     1. Medial epicondylitis, right elbow [M77.01]  2. Cubital tunnel syndrome on right [G56.21]    Surgeon(s) and Role:     Jeffrey Rider MD - Primary     Assistant: None    Procedures:     1. Right cubital tunnel release with subcutaneous transposition (CPT 15973)  2. Right medial epicondyle debridement and common flexor pronator tenon repair (CPT 64185)    Antibiotics: Ancef 2g    Surgical Findings: degenerative changes of common flexor tendon, ulnar nerve instability    Anesthesia: Regional + Local    Complications: None    Implants: None    Specimen: None    Condition: Stable    Estimated Blood Loss: 15 mL    Indications:  49year old male with medial epicondylitis and cubital tunnel syndrome refractory to nonsurgical management. Using a shared decision making process, a decision was was made to proceed with ulnar nerve decompression and medial epicondyle debridement with tendon repair. Patient consented to the operation having understood the risks associated with surgery, expected outcome, time to recovery and the need for possible rehab. Risks that were discussed but not limited to infection, nerve injury, tendon injury, artery injury, need for additional surgery, and no improvements of present symptoms. Procedure:  Patient was met in the preoperative holding area where consent was verified, laterality was marked with the surgeon's initials, and the H&P was updated. Patient was brought to the operating room on a transport cart. Patient was then transferred onto the operating room table and placed in supine position with an arm table and with bony prominences well-padded. SCDs were placed. Antibiotics were fully infused. The arm was then prepped and draped in the usual sterile fashion.  A surgical timeout was performed. A sterile upper arm tourniquet was placed and the limb was exsanguinated using Esmarch bandage. Tourniquet was raised to 250mmHg. 1% lidocaine with epi was infiltrated along the medial aspect of elbow along the planned incision. A 15 blade was used to make incision through the dermis to the subcutaneous tissue. Bipolar cautery was used to cauterize any small crossing vessels. Adson and carr scissors were used to dissect through the subcutaneous tissue until wu fascia was identified. Wu fascia was divided and the ulnar was identified. Any fascial elements compressing the nerve proximally were identified and divided. The nerve was then traced distally, dividing fascial elements compressing the nerve. The fascia overlying the FCU was isolated from the FCU muscle underneath with carr scissors. The fascia was then divided. Danella Riedel scissor was used to spread the FCU muscle fiber. The nerve was fully decompressed with no compressive points. The elbow was fully flexed and extended with subluxation of the ulnar nerve. Decision to proceed with a ulnar nerve subcutaneous transposition was made. The ulnar nerve was circumferentially released of its soft tissue attachments to allow mobilization. The intermuscular septum was identified proximally and excised with Danella Riedel scissors. The common flexor tendon origin was identified and marked. A 15 blade was used to elevate the common flexor tendon off the medial epicondyle. The undersurface of the tendon was debrided of degenerated tendon. The attachment site of common flexor tendon of the medial epicondyle was curetted. Small perforation where made at the origin using kwire. A guidepin was used to drill the 2 holes for the [employer] Juggerknot suture anchor. The anchor was seated at the proximal knee epicondyle and 1 at the distal aspect of the medial condyle.  The suture limbs were tugged on to ensure the anchor was fully seated/deployed. The #2 maxibraid suture was used to repair the common flexor tendon down to its origin. Good apposition of tendon to its footprint was noted. The suture tails were then tied to each other further opposing the tendon between the anchor sites. The ulnar nerve was then transposed anterior to the medial epicondyle. A proximal based fascial flap was created and sutured to the adipose tissue of the anterior skin flap. A 0 Vicryl was used to repair the fascial flap to the adipose tissue of the anterior skin flap. The ulnar nerve was noted to be in a stable position anterior to the medial epicondyle. The elbow was taken through range of motion and was noted to have 1 finger breath of space in all elbow positions. The tourniquet was then lowered and bipolar cautery was used to achieve hemostasis. Closure: The cubital tunnel release incision was closed using deep 3-0 vicryl and superficial 3-0 Monocryl in a buried simple interrupted fashion. 3-0 Monocryl was used in a subcuticular fashion. Exofilm skin glue was applied and Steri-Strips were placed. Dressing/Splint:  Adaptic, 4 x 4 gauze and web roll was applied. The patient's arm was placed in a long-arm postmold splint and gently wrapped with an Ace wrap. The arm was placed in a sling. Post Operative:  Patient was woken up from anesthesia and taken to PACU for further recovery and discharge home. Cintia Jacobson MD  Hand, Wrist, & Elbow Surgery  [facility]  [facility] [address], [address] [facility], [address] [place] [address]  [email or web address]  t: [id number]  f: [phone]

## 2023-03-15 NOTE — ANESTHESIA POSTPROCEDURE EVALUATION
Ginny [address] Patient Status:  Hospital Outpatient Surgery   Age/Gender 49year old male MRN [id number]   [facility] SURGERY Attending Miguel Flood MD   Hosp Day # 0 PCP Chen Jurado MD       Anesthesia Post-op Note    RIGHT ELBOW COMMON FLEXOR TENDON DEBRIDEMENT AND REPAIR, RIGHT CUBITAL TUNNEL RELEASE. Procedure Summary     Date: 03/15/23 Room / Location: [facility] OR 05 / 1404 [facility] OR    Anesthesia Start: 1119 Anesthesia Stop: 6070    Procedure: RIGHT ELBOW COMMON FLEXOR TENDON DEBRIDEMENT AND REPAIR, RIGHT CUBITAL TUNNEL RELEASE. (Right: Elbow) Diagnosis:       Medial epicondylitis, right elbow      Cubital tunnel syndrome on right      (Medial epicondylitis, right elbow [M77.01]Cubital tunnel syndrome on right [G56.21])    Surgeons: Miguel Flood MD Anesthesiologist: Tiffanie Ramos MD    Anesthesia Type: MAC, regional ASA Status: 3          Anesthesia Type: MAC, regional    Vitals Value Taken Time   /90 03/15/23 1331   Temp 97.3 03/15/23 1331   Pulse 79 03/15/23 1331   Resp 14 03/15/23 1331   SpO2 96 03/15/23 1331       Patient Location: Same Day Surgery    Anesthesia Type: MAC and spinal    Airway Patency: patent    Postop Pain Control: adequate    Mental Status: preanesthetic baseline    Nausea/Vomiting: none    Cardiopulmonary/Hydration status: stable euvolemic    Complications: no apparent anesthesia related complications    Postop vital signs: stable    Dental Exam: Unchanged from Preop    Patient to be discharged home when criteria met.

## 2023-03-15 NOTE — ANESTHESIA PROCEDURE NOTES
Regional Block    Performed by: Tony Singh CRNA  Authorized by: Effie Bojorquez MD      General Information and Staff    Start Time:   Anesthesiologist:  Effie Bojorquez MD  CRNA:  Tony Singh CRNA  Performed by:  CRNA  Patient Location:  OR    Block Placement: Pre Induction  Site Identification: real time ultrasound guided and image stored and retrievable    Block site/laterality marked before start: site marked  Reason for Block: at surgeon's request and post-op pain management    Preanesthetic Checklist: 2 patient identifers, IV checked, site marked, risks and benefits discussed, monitors and equipment checked, pre-op evaluation, timeout performed, anesthesia consent, sterile technique used, no prohibitive neurological deficits and no local skin infection at insertion site      Procedure Details    Patient Position:  Supine  Prep: ChloraPrep    Monitoring:  Cardiac monitor, continuous pulse ox and blood pressure cuff  Block Type:  Supraclavicular  Laterality:  Right  Injection Technique:  Single-shot    Needle    Needle Type:  Short-bevel and echogenic  Needle Gauge:  21 G  Needle Length:  100 mm  Needle Localization:  Ultrasound guidance  Reason for Ultrasound Use: appropriate spread of the medication was noted in real time and no ultrasound evidence of intravascular and/or intraneural injection            Assessment    Injection Assessment:  Good spread noted, negative resistance, negative aspiration for heme, incremental injection, low pressure, local visualized surrounding nerve on ultrasound and no pain on injection  Paresthesia Pain:  None  Heart Rate Change: No    - Patient tolerated block procedure well without evidence of immediate block related complications.      Medications      Additional Comments    Medication:  Ropivacaine 0.5% 30mL & PF Decadron 2mg

## 2023-03-15 NOTE — DISCHARGE INSTRUCTIONS
Dressing: Keep splint on until visit with Occupational Therapist. Keep splint clean/dry/intact. Okay to shower with splint covered/protected from getting wet. Weight Bearing: No lifting greater than 1 pound    Activity: You can use your hands for activities of daily living within the 1 pound restriction. Pain: Take acetaminophen and ibuprofen for pain.  Norco as needed

## 2023-03-22 ENCOUNTER — TELEPHONE (OUTPATIENT)
Dept: PHYSICAL THERAPY | Facility: HOSPITAL | Age: 48
End: 2023-03-22

## 2023-03-27 ENCOUNTER — OFFICE VISIT (OUTPATIENT)
Dept: ORTHOPEDICS CLINIC | Facility: CLINIC | Age: 48
End: 2023-03-27
Payer: OTHER MISCELLANEOUS

## 2023-03-27 VITALS — HEIGHT: 72 IN | BODY MASS INDEX: 42.66 KG/M2 | WEIGHT: 315 LBS

## 2023-03-27 DIAGNOSIS — M77.01 MEDIAL EPICONDYLITIS, RIGHT ELBOW: Primary | ICD-10-CM

## 2023-03-27 PROCEDURE — 99024 POSTOP FOLLOW-UP VISIT: CPT | Performed by: PHYSICIAN ASSISTANT

## 2023-03-27 PROCEDURE — 3008F BODY MASS INDEX DOCD: CPT | Performed by: PHYSICIAN ASSISTANT

## 2023-03-27 RX ORDER — MELOXICAM 15 MG/1
TABLET ORAL
Qty: 90 TABLET | Refills: 0 | OUTPATIENT
Start: 2023-03-27

## 2023-03-27 RX ORDER — MELOXICAM 15 MG/1
15 TABLET ORAL DAILY
Qty: 30 TABLET | Refills: 0 | Status: SHIPPED | OUTPATIENT
Start: 2023-03-27

## 2023-04-03 ENCOUNTER — TELEPHONE (OUTPATIENT)
Dept: ORTHOPEDICS CLINIC | Facility: CLINIC | Age: 48
End: 2023-04-03

## 2023-04-03 NOTE — TELEPHONE ENCOUNTER
Basilio Roblero is calling from [facility] physical therapy wanting to get clarification on therapy order.

## 2023-04-03 NOTE — TELEPHONE ENCOUNTER
Patient told Physical therapist he could not do ROM on wrist.  Physical therapist called to clarify. Per LOV: 3/27/23 - \"we have given him a removable wrist brace to be worn to protect the repair site. He can work on gentle range of motion of the elbow and wrist.  No heavy lifting or strengthening. \"    Physical therapist notified of Current plan.

## 2023-04-24 ENCOUNTER — OFFICE VISIT (OUTPATIENT)
Dept: ORTHOPEDICS CLINIC | Facility: CLINIC | Age: 48
End: 2023-04-24
Payer: OTHER MISCELLANEOUS

## 2023-04-24 VITALS — BODY MASS INDEX: 42.66 KG/M2 | HEIGHT: 72 IN | WEIGHT: 315 LBS

## 2023-04-24 DIAGNOSIS — M77.01 MEDIAL EPICONDYLITIS, RIGHT ELBOW: Primary | ICD-10-CM

## 2023-04-24 DIAGNOSIS — G56.21 CUBITAL TUNNEL SYNDROME ON RIGHT: ICD-10-CM

## 2023-04-24 PROCEDURE — 99024 POSTOP FOLLOW-UP VISIT: CPT | Performed by: PHYSICIAN ASSISTANT

## 2023-04-24 PROCEDURE — 3008F BODY MASS INDEX DOCD: CPT | Performed by: PHYSICIAN ASSISTANT

## 2023-05-02 ENCOUNTER — TELEPHONE (OUTPATIENT)
Dept: FAMILY MEDICINE CLINIC | Facility: CLINIC | Age: 48
End: 2023-05-02

## 2023-05-19 NOTE — TELEPHONE ENCOUNTER
Request received for 1/27/23. Faxed notes to workers comp  at [phone]. Received fax confirmation.

## 2023-05-22 RX ORDER — MELOXICAM 15 MG/1
TABLET ORAL
Qty: 30 TABLET | Refills: 0 | Status: SHIPPED | OUTPATIENT
Start: 2023-05-22

## 2023-05-22 NOTE — TELEPHONE ENCOUNTER
DOS: 03/15/23  Last OV: 04/24/23  Last refill date: 03/27/23     #/refills: 30/0  Upcoming appt:    Future Appointments   Date Time Provider Mendy Bailon   6/5/2023 10:00 AM Carolina Cutler MD [facility] [id number]

## 2023-06-05 ENCOUNTER — OFFICE VISIT (OUTPATIENT)
Dept: ORTHOPEDICS CLINIC | Facility: CLINIC | Age: 48
End: 2023-06-05
Payer: OTHER MISCELLANEOUS

## 2023-06-05 VITALS — BODY MASS INDEX: 42.66 KG/M2 | WEIGHT: 315 LBS | HEIGHT: 72 IN

## 2023-06-05 DIAGNOSIS — G56.21 CUBITAL TUNNEL SYNDROME ON RIGHT: ICD-10-CM

## 2023-06-05 DIAGNOSIS — M77.01 MEDIAL EPICONDYLITIS, RIGHT ELBOW: Primary | ICD-10-CM

## 2023-06-05 PROCEDURE — 99213 OFFICE O/P EST LOW 20 MIN: CPT | Performed by: ORTHOPAEDIC SURGERY

## 2023-06-05 PROCEDURE — 3008F BODY MASS INDEX DOCD: CPT | Performed by: ORTHOPAEDIC SURGERY

## 2023-07-10 ENCOUNTER — OFFICE VISIT (OUTPATIENT)
Dept: ORTHOPEDICS CLINIC | Facility: CLINIC | Age: 48
End: 2023-07-10
Payer: OTHER MISCELLANEOUS

## 2023-07-10 VITALS — WEIGHT: 294 LBS | BODY MASS INDEX: 39.82 KG/M2 | HEIGHT: 72 IN

## 2023-07-10 DIAGNOSIS — M77.01 MEDIAL EPICONDYLITIS, RIGHT ELBOW: Primary | ICD-10-CM

## 2023-07-10 DIAGNOSIS — G56.21 CUBITAL TUNNEL SYNDROME ON RIGHT: ICD-10-CM

## 2023-07-10 PROCEDURE — 99213 OFFICE O/P EST LOW 20 MIN: CPT | Performed by: ORTHOPAEDIC SURGERY

## 2023-07-10 PROCEDURE — 3008F BODY MASS INDEX DOCD: CPT | Performed by: ORTHOPAEDIC SURGERY

## 2023-08-01 ENCOUNTER — MED REC SCAN ONLY (OUTPATIENT)
Dept: ORTHOPEDICS CLINIC | Facility: CLINIC | Age: 48
End: 2023-08-01

## 2023-08-10 ENCOUNTER — TELEPHONE (OUTPATIENT)
Dept: ORTHOPEDICS CLINIC | Facility: CLINIC | Age: 48
End: 2023-08-10

## 2023-08-10 DIAGNOSIS — G56.21 CUBITAL TUNNEL SYNDROME ON RIGHT: ICD-10-CM

## 2023-08-10 DIAGNOSIS — M77.01 MEDIAL EPICONDYLITIS, RIGHT ELBOW: Primary | ICD-10-CM

## 2023-08-10 NOTE — TELEPHONE ENCOUNTER
Paolo Weaver, pts [facility] manager, is requesting an earlier appt for pt as he needs to be re-evaluated and have updated OT orders. Paolo Weaver is requesting the pt be seen on 08/14. Please advise. Thanks!      Paolo Weaver: [phone]

## 2023-08-11 NOTE — TELEPHONE ENCOUNTER
WC requesting a new OT order (order pending)    Needs a new Occupational therapy order  - with specifics as it is WC of 2-3 times week per therapist discretion until 8/21 when re evaluated    - when is he ready to do work conditioning. (Will you eval on the 21st)    Future Appointments   Date Time Provider Mendy Bailon   8/21/2023  9:00 AM Georgia Bueno MD [facility] [id number]     Please fax to   [phone] [facility]   Fax: [phone]

## 2023-08-14 ENCOUNTER — TELEPHONE (OUTPATIENT)
Dept: ORTHOPEDICS CLINIC | Facility: CLINIC | Age: 48
End: 2023-08-14

## 2023-08-14 NOTE — TELEPHONE ENCOUNTER
Patient is already scheduled for the 21st.  His PT order needs to be renewed as he is out of visits (per [employer]) and will not be seen until the 21 st.    Order Pending   - do you want him to take a break in PT until seen on 8/21?    Future Appointments   Date Time Provider Mendy Bailon   8/21/2023  9:00 AM Jocelyne Francis MD [facility] [id number]

## 2023-08-21 ENCOUNTER — OFFICE VISIT (OUTPATIENT)
Dept: ORTHOPEDICS CLINIC | Facility: CLINIC | Age: 48
End: 2023-08-21
Payer: OTHER MISCELLANEOUS

## 2023-08-21 VITALS — WEIGHT: 290 LBS | BODY MASS INDEX: 39.28 KG/M2 | HEIGHT: 72 IN

## 2023-08-21 DIAGNOSIS — M77.01 MEDIAL EPICONDYLITIS, RIGHT ELBOW: Primary | ICD-10-CM

## 2023-08-21 DIAGNOSIS — G56.21 CUBITAL TUNNEL SYNDROME ON RIGHT: ICD-10-CM

## 2023-08-21 PROCEDURE — 3008F BODY MASS INDEX DOCD: CPT | Performed by: ORTHOPAEDIC SURGERY

## 2023-08-21 PROCEDURE — 99214 OFFICE O/P EST MOD 30 MIN: CPT | Performed by: ORTHOPAEDIC SURGERY

## 2023-09-01 ENCOUNTER — MED REC SCAN ONLY (OUTPATIENT)
Dept: ORTHOPEDICS CLINIC | Facility: CLINIC | Age: 48
End: 2023-09-01

## 2023-10-01 ENCOUNTER — MED REC SCAN ONLY (OUTPATIENT)
Dept: ORTHOPEDICS CLINIC | Facility: CLINIC | Age: 48
End: 2023-10-01

## 2023-10-02 ENCOUNTER — OFFICE VISIT (OUTPATIENT)
Facility: LOCATION | Age: 48
End: 2023-10-02
Payer: COMMERCIAL

## 2023-10-02 ENCOUNTER — OFFICE VISIT (OUTPATIENT)
Dept: ORTHOPEDICS CLINIC | Facility: CLINIC | Age: 48
End: 2023-10-02
Payer: COMMERCIAL

## 2023-10-02 VITALS — TEMPERATURE: 96 F | HEART RATE: 54 BPM

## 2023-10-02 VITALS — BODY MASS INDEX: 38.19 KG/M2 | WEIGHT: 282 LBS | HEIGHT: 72 IN

## 2023-10-02 DIAGNOSIS — Z12.11 ENCOUNTER FOR SCREENING COLONOSCOPY: Primary | ICD-10-CM

## 2023-10-02 DIAGNOSIS — G56.21 CUBITAL TUNNEL SYNDROME ON RIGHT: ICD-10-CM

## 2023-10-02 DIAGNOSIS — M77.01 MEDIAL EPICONDYLITIS, RIGHT ELBOW: Primary | ICD-10-CM

## 2023-10-02 PROCEDURE — S0285 CNSLT BEFORE SCREEN COLONOSC: HCPCS | Performed by: STUDENT IN AN ORGANIZED HEALTH CARE EDUCATION/TRAINING PROGRAM

## 2023-10-02 PROCEDURE — 3008F BODY MASS INDEX DOCD: CPT | Performed by: ORTHOPAEDIC SURGERY

## 2023-10-02 PROCEDURE — 99213 OFFICE O/P EST LOW 20 MIN: CPT | Performed by: ORTHOPAEDIC SURGERY

## 2023-10-02 RX ORDER — POLYETHYLENE GLYCOL 3350, SODIUM CHLORIDE, SODIUM BICARBONATE, POTASSIUM CHLORIDE 420; 11.2; 5.72; 1.48 G/4L; G/4L; G/4L; G/4L
POWDER, FOR SOLUTION ORAL
Qty: 1 EACH | Refills: 0 | Status: SHIPPED | OUTPATIENT
Start: 2023-10-02

## 2023-11-01 ENCOUNTER — MED REC SCAN ONLY (OUTPATIENT)
Dept: ORTHOPEDICS CLINIC | Facility: CLINIC | Age: 48
End: 2023-11-01

## 2023-11-06 ENCOUNTER — OFFICE VISIT (OUTPATIENT)
Dept: ORTHOPEDICS CLINIC | Facility: CLINIC | Age: 48
End: 2023-11-06
Payer: OTHER MISCELLANEOUS

## 2023-11-06 VITALS — BODY MASS INDEX: 37.93 KG/M2 | WEIGHT: 280 LBS | HEIGHT: 72 IN

## 2023-11-06 DIAGNOSIS — M77.01 MEDIAL EPICONDYLITIS, RIGHT ELBOW: Primary | ICD-10-CM

## 2023-11-06 DIAGNOSIS — G56.21 CUBITAL TUNNEL SYNDROME ON RIGHT: ICD-10-CM

## 2023-11-06 PROCEDURE — 99214 OFFICE O/P EST MOD 30 MIN: CPT | Performed by: ORTHOPAEDIC SURGERY

## 2023-11-06 PROCEDURE — 3008F BODY MASS INDEX DOCD: CPT | Performed by: ORTHOPAEDIC SURGERY

## 2023-12-01 ENCOUNTER — MED REC SCAN ONLY (OUTPATIENT)
Dept: ORTHOPEDICS CLINIC | Facility: CLINIC | Age: 48
End: 2023-12-01

## 2023-12-04 ENCOUNTER — OFFICE VISIT (OUTPATIENT)
Dept: ORTHOPEDICS CLINIC | Facility: CLINIC | Age: 48
End: 2023-12-04
Payer: OTHER MISCELLANEOUS

## 2023-12-04 VITALS — WEIGHT: 280 LBS | BODY MASS INDEX: 37.93 KG/M2 | HEIGHT: 72 IN

## 2023-12-04 DIAGNOSIS — M77.01 MEDIAL EPICONDYLITIS, RIGHT ELBOW: ICD-10-CM

## 2023-12-04 DIAGNOSIS — G56.21 CUBITAL TUNNEL SYNDROME ON RIGHT: Primary | ICD-10-CM

## 2023-12-04 PROCEDURE — 3008F BODY MASS INDEX DOCD: CPT | Performed by: ORTHOPAEDIC SURGERY

## 2023-12-04 PROCEDURE — 99213 OFFICE O/P EST LOW 20 MIN: CPT | Performed by: ORTHOPAEDIC SURGERY

## 2023-12-20 ENCOUNTER — TELEPHONE (OUTPATIENT)
Facility: LOCATION | Age: 48
End: 2023-12-20

## 2023-12-20 DIAGNOSIS — Z12.11 ENCOUNTER FOR SCREENING COLONOSCOPY: Primary | ICD-10-CM

## 2024-01-30 ENCOUNTER — PATIENT MESSAGE (OUTPATIENT)
Dept: ORTHOPEDICS CLINIC | Facility: CLINIC | Age: 49
End: 2024-01-30

## 2024-01-31 DIAGNOSIS — M77.01 MEDIAL EPICONDYLITIS, RIGHT ELBOW: ICD-10-CM

## 2024-01-31 DIAGNOSIS — G56.21 ULNAR NEUROPATHY AT ELBOW OF RIGHT UPPER EXTREMITY: ICD-10-CM

## 2024-01-31 DIAGNOSIS — M77.01 MEDIAL EPICONDYLITIS OF RIGHT ELBOW: Primary | ICD-10-CM

## 2024-01-31 DIAGNOSIS — G56.21 CUBITAL TUNNEL SYNDROME ON RIGHT: ICD-10-CM

## 2024-01-31 NOTE — TELEPHONE ENCOUNTER
LOV 12/04/2023     Right elbow medial epicondyle debridement with common flexor tendon repair and ulnar nerve transposition 3/15/2023     Pended letter. Please advise if okay to provide (went off last restrictions in November)

## 2024-01-31 NOTE — TELEPHONE ENCOUNTER
Meloxicam 15 mg oral tablet    RIGHT ELBOW COMMON FLEXOR TENDON DEBRIDEMENT AND REPAIR, RIGHT CUBITAL TUNNEL RELEASE.     DOS: 03/15/2023   Last OV: 12/04/2023  Last refill date: 05/22/2023     #/refills: 30/0  Upcoming appt: 02/12/2024-

## 2024-01-31 NOTE — TELEPHONE ENCOUNTER
From: Bryant Mariscal  To: Jad Little  Sent: 1/30/2024 8:19 PM CST  Subject: My Elbow    Hello Dr. Little,    I have been having pain with certain movements in my elbow. When I lift with my arm bent it hurts and a certain twist up. 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 We have an appointment coming up on 2/12 and I would like to discuss if anything else can be done. Is there any chance I can have a letter explaining this and with restrictions until our appointment? I just don't want this to get worse before I even see you. Thank you.

## 2024-02-01 NOTE — TELEPHONE ENCOUNTER
Pt called stating he can't see the letter in his mychart. Can we try sending it again?    Thanks!

## 2024-02-02 RX ORDER — MELOXICAM 15 MG/1
15 TABLET ORAL DAILY
Qty: 30 TABLET | Refills: 0 | Status: SHIPPED | OUTPATIENT
Start: 2024-02-02

## 2024-02-12 ENCOUNTER — OFFICE VISIT (OUTPATIENT)
Dept: ORTHOPEDICS CLINIC | Facility: CLINIC | Age: 49
End: 2024-02-12
Payer: OTHER MISCELLANEOUS

## 2024-02-12 VITALS — WEIGHT: 280 LBS | HEIGHT: 72 IN | BODY MASS INDEX: 37.93 KG/M2

## 2024-02-12 DIAGNOSIS — M77.01 MEDIAL EPICONDYLITIS OF RIGHT ELBOW: ICD-10-CM

## 2024-02-12 DIAGNOSIS — G56.21 ULNAR NEUROPATHY AT ELBOW OF RIGHT UPPER EXTREMITY: Primary | ICD-10-CM

## 2024-02-12 PROCEDURE — 99213 OFFICE O/P EST LOW 20 MIN: CPT | Performed by: ORTHOPAEDIC SURGERY

## 2024-02-12 NOTE — PROGRESS NOTES
Clinic Note [facility]     Assessment/Plan:  48 year old male    Right elbow medial epicondyle debridement with common flexor tendon repair and ulnar nerve transposition 3/15/2023-tingling is still prominent and intermittent in small finger. Continue with therapy to improve his range of motion and strengthen his muscles. Likely not medial epicondylitis is causing the tingling. Could be nerve is irritated due to scar tissue. MRI will be ordered to verify findings. Can consider revision surgery -neurolysis with nerve wrapping dependent on MRI findings with nerve wrapping.   Work status: Continue work with 25 pound restrictions.     Follow Up: 2-3 weeks    Diagnostic Studies:  MRI right elbow 3 views: Patient has moderate tendinosis of the common flexor tendon at the medial epicondyle with a small interstitial tear.  There is also a full-thickness tear of the common extensor tendon at its origin  EMG/NCV:  No ulnar neuropathy, mild to moderate CTS    Physical Exam:    Ht 6' (1.829 m)   Wt 280 lb (127 kg)   BMI 37.97 kg/m²     Constitutional: NAD. AOx3. Well-developed and Well-nourished.   Psychiatric: Normal mood/ affect/ behavior. Judgment and thought content normal.     Right upper Extremity:   Inspection:  Incision healed   Palpation: Tenderness over the ulnar nerve   Mild discomfort with resisted pronation. No pain with resisted supination.  Sensation normal in small finger  Tingling at base of pinky finger to tip - intermittent   Motion: Elbow: Full elbow extension flexion. Full elbow motion, full pronation and supination.   Wrist: normal bilateral symmetric ext/flex/sup/pro  Finger: full composite fist.       Median Nerve Exam Right   Phdurkan neg   Sensation normal   PCBr Sensation normal   APB Weakness No   Thenar Atrophy No     Ulnar Nerve Exam Right   Tinels +   EF -   Hypermobility @ elbow neg   Sensation normal     1st JIM Weakness No   Hypothenar Atrophy No     Radial Nerve Exam  Right Left    Radial Sensory normal normal       CC: Numbness and tingling    HPI: This 48 year old right-hand-dominant male presents with medial sided elbow pain with numbness and tingling in the ring finger and small finger.  It started on June 29.  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  Since then he has developed constant numbness and constant tingling in the ring finger and small finger.  He does have intermittent numbness in the dorsal ulnar sensory nerve branch.  He has shooting pain down the forearm into his fingers.  He has tried a corticosteroid injection, brace, and therapy without significant improvement in symptoms.  His pain is mild to moderate.  Occupation: Maintenance      Interval Hx (10/2/23): Latest numbness and tingling episode on Saturday (9/30/23) for the whole day. Strength is improving. He is able to withstand 10 lbs in therapy. Overall, symptoms are improving.    Interval history 11/6/23: He states that he is doing his strength training as instructed. He notes that it is slightly difficult. He states that his strength is notably improving.   He states that the tingling occurs every once in a while.     Interval history (12/4/23): Patient reports improvement in numbness and tingling, with tingling only occurring occasionally. Reports that he is continuing with strength training 5 times a week. He does not feel ready to return to work at this time, and would like to follow through with returning in 2 weeks based on therapist's recommendation.     Interval history 2/12/24: Pt states that his symptoms are the same. He does get some tingling at times. Pt is still taking Meloxicam as prescribed.  Sharp pain described along the path of the ulnar nerve.      Past Medical History:   Diagnosis Date    ADD (attention deficit disorder)     Anesthesia complication     severe headaches every time he gets anesthesia    Essential hypertension     Focal  hemorrhagic contusion of cerebrum (HCC)     Intraparenchymal hemorrhage of brain (HCC)     Obstructive sleep apnea (adult) (pediatric) Edward PSG 2-10-17 [place] 4-14-17    AHI 8 REM AHI 36 SaO2 tiarra 73 % CPAP 10 Premier     Osteoarthrosis, unspecified whether generalized or localized, unspecified site     Sleep apnea     CPAP    Spondylosis of lumbar region without myelopathy or radiculopathy      Past Surgical History:   Procedure Laterality Date    ANKLE FRACTURE SURGERY      DRAIN/INJECT LARGE JOINT/BURSA Bilateral 10/26/2015    Procedure: BURSA INJECTION;  Surgeon: Taco Rea MD;  Location: [facility]    FLUOROSCOPIC GUIDANCE NEEDLE PLACEMENT Bilateral 10/26/2015    Procedure: BURSA INJECTION;  Surgeon: Taco Rea MD;  Location: [facility]    OTHER Left 09/2017    work injury , left wrist     OTHER SURGICAL HISTORY      left wrist      Current Outpatient Medications   Medication Sig Dispense Refill    Meloxicam 15 MG Oral Tab Take 1 tablet (15 mg total) by mouth daily. 30 tablet 0    PEG 3350-KCl-Na Bicarb-NaCl (TRILYTE) 420 g Oral Recon Soln Starting at 4:00 pm the night before procedure, drink 8 ounces of the prep every 15-20 minutes until finished 1 each 0    Tadalafil 10 MG Oral Tab Take 1 tablet (10 mg total) by mouth daily as needed for Erectile Dysfunction. 20 tablet 3     No Known Allergies  Family History   Problem Relation Age of Onset    Personality Disorder Mother     Depression Mother     Anxiety Mother      Social History     Occupational History    Not on file   Tobacco Use    Smoking status: Never    Smokeless tobacco: Never   Vaping Use    Vaping Use: Never used   Substance and Sexual Activity    Alcohol use: Yes     Comment: Rarely    Drug use: No    Sexual activity: Not on file        Review of Systems (negative unless bolded):  General: fevers, chills, fatigue  CV:  chest pain, palpitations, leg swelling  Msk: bodyaches, neck pain, neck  stiffness  Skin: rashes, open wounds, nonhealing ulcers  Hem: bleeds easily, bruise easily, immunocompromised  Neuro: dizziness, light headedness, headaches  Psych: anxious, depressed, anger issues    Attention: This note has been scribed by Shawna Izaguirre under the supervision of Jda Little MD.        Jad Little MD  Hand, Wrist, & Elbow Surgery  [facility]  [address] [place] [address], [address], [email or web address]  [email or web address]  t: [phone]  f: [phone]

## 2024-03-06 ENCOUNTER — OFFICE VISIT (OUTPATIENT)
Dept: ORTHOPEDICS CLINIC | Facility: CLINIC | Age: 49
End: 2024-03-06
Payer: OTHER MISCELLANEOUS

## 2024-03-06 VITALS — WEIGHT: 280 LBS | HEIGHT: 72 IN | BODY MASS INDEX: 37.93 KG/M2

## 2024-03-06 DIAGNOSIS — G56.21 ULNAR NEUROPATHY AT ELBOW OF RIGHT UPPER EXTREMITY: Primary | ICD-10-CM

## 2024-03-06 PROCEDURE — 99215 OFFICE O/P EST HI 40 MIN: CPT | Performed by: ORTHOPAEDIC SURGERY

## 2024-03-06 NOTE — PROGRESS NOTES
Clinic Note       Assessment/Plan:  48 year old male    Right elbow medial epicondyle debridement with common flexor tendon repair and ulnar nerve transposition 3/15/2023-healed and recovered from commong flexor tendon repair  Recurrence of cubital tunnel syndrome secondary to scar tissue formation- Evident on MRI described as mild edema and fascicular thickening. Surgical treatment recommend is revision cubital tunnel release (ulnar nerve neurolysis) with nerve wrapping recommended at this time. Patient is in agreement with plan - surgery scheduled 4/1/2024.  Work status: Continue work with 25 pound restrictions.       Follow Up: For surgery on  4/1/24    Diagnostic Studies:  MRI right elbow: Status post ulnar transposition. Mildly enlarged caliber of the ulnar nerve with mild edema and fascicular thickening is of uncertain clinical significance, and can be seen with neuritis. Status post common flexor tendon repair. Heterogeneity compatible with a combination of postsurgical change and mild tendinosis.  Ill-defined high-grade partial tear of the humeral attachment of the ulnar collateral ligament and adjacent common extensor tendon. Mild biceps tendinosis.   MRI right elbow 3 views: Patient has moderate tendinosis of the common flexor tendon at the medial epicondyle with a small interstitial tear.  There is also a full-thickness tear of the common extensor tendon at its origin  EMG/NCV:  No ulnar neuropathy, mild to moderate CTS    Physical Exam:    Ht 6' (1.829 m)   Wt 280 lb (127 kg)   BMI 37.97 kg/m²     Constitutional: NAD. AOx3. Well-developed and Well-nourished.   Psychiatric: Normal mood/ affect/ behavior. Judgment and thought content normal.     Right upper Extremity:   Inspection:  Incision healed   Palpation: Tenderness over the ulnar nerve   Mild discomfort with resisted pronation. No pain with resisted supination.  Sensation normal in small finger  Tingling at base of pinky finger to tip -  intermittent   Motion: Elbow: Full elbow extension flexion. Full elbow motion, full pronation and supination.   Wrist: normal bilateral symmetric ext/flex/sup/pro  Finger: full composite fist.       Median Nerve Exam Right   Phdurkan neg   Sensation normal   PCBr Sensation normal   APB Weakness No   Thenar Atrophy No     Ulnar Nerve Exam Right   Tinels +   EF -   Hypermobility @ elbow neg   Sensation normal     1st JIM Weakness No   Hypothenar Atrophy No     Radial Nerve Exam  Right Left   Radial Sensory normal normal       CC: Numbness and tingling    HPI: This 48 year old right-hand-dominant male presents with medial sided elbow pain with numbness and tingling in the ring finger and small finger.  It started on June 29.  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  Since then he has developed constant numbness and constant tingling in the ring finger and small finger.  He does have intermittent numbness in the dorsal ulnar sensory nerve branch.  He has shooting pain down the forearm into his fingers.  He has tried a corticosteroid injection, brace, and therapy without significant improvement in symptoms.  His pain is mild to moderate.  Occupation: Maintenance      Interval Hx (10/2/23): Latest numbness and tingling episode on Saturday (9/30/23) for the whole day. Strength is improving. He is able to withstand 10 lbs in therapy. Overall, symptoms are improving.    Interval history 11/6/23: He states that he is doing his strength training as instructed. He notes that it is slightly difficult. He states that his strength is notably improving.   He states that the tingling occurs every once in a while.     Interval history (12/4/23): Patient reports improvement in numbness and tingling, with tingling only occurring occasionally. Reports that he is continuing with strength training 5 times a week. He does not feel ready to return to work at this time, and would like  to follow through with returning in 2 weeks based on therapist's recommendation.     Interval history 2/12/24: Pt states that his symptoms are the same. He does get some tingling at times. Pt is still taking Meloxicam as prescribed.  Sharp pain described along the path of the ulnar nerve.    Interval history (3/6/2024): Symptoms unchanged from last visit.     Past Medical History:   Diagnosis Date    ADD (attention deficit disorder)     Anesthesia complication     severe headaches every time he gets anesthesia    Essential hypertension     Focal hemorrhagic contusion of cerebrum (HCC)     Intraparenchymal hemorrhage of brain (HCC)     Obstructive sleep apnea (adult) (pediatric) Edward PSG 2-10-17 [place] 4-14-17    AHI 8 REM AHI 36 SaO2 tiarra 73 % CPAP 10 Premier     Osteoarthrosis, unspecified whether generalized or localized, unspecified site     Sleep apnea     CPAP    Spondylosis of lumbar region without myelopathy or radiculopathy      Past Surgical History:   Procedure Laterality Date    ANKLE FRACTURE SURGERY      DRAIN/INJECT LARGE JOINT/BURSA Bilateral 10/26/2015    Procedure: BURSA INJECTION;  Surgeon: Taco Rea MD;  Location: [facility]    FLUOROSCOPIC GUIDANCE NEEDLE PLACEMENT Bilateral 10/26/2015    Procedure: BURSA INJECTION;  Surgeon: Taco Rea MD;  Location: [facility]    OTHER Left 09/2017    work injury , left wrist     OTHER SURGICAL HISTORY      left wrist      Current Outpatient Medications   Medication Sig Dispense Refill    Meloxicam 15 MG Oral Tab Take 1 tablet (15 mg total) by mouth daily. 30 tablet 0    PEG 3350-KCl-Na Bicarb-NaCl (TRILYTE) 420 g Oral Recon Soln Starting at 4:00 pm the night before procedure, drink 8 ounces of the prep every 15-20 minutes until finished 1 each 0    Tadalafil 10 MG Oral Tab Take 1 tablet (10 mg total) by mouth daily as needed for Erectile Dysfunction. 20 tablet 3     No Known Allergies  Family History   Problem  Relation Age of Onset    Personality Disorder Mother     Depression Mother     Anxiety Mother      Social History     Occupational History    Not on file   Tobacco Use    Smoking status: Never    Smokeless tobacco: Never   Vaping Use    Vaping Use: Never used   Substance and Sexual Activity    Alcohol use: Yes     Comment: Rarely    Drug use: No    Sexual activity: Not on file        Review of Systems (negative unless bolded):  General: fevers, chills, fatigue  CV:  chest pain, palpitations, leg swelling  Msk: bodyaches, neck pain, neck stiffness  Skin: rashes, open wounds, nonhealing ulcers  Hem: bleeds easily, bruise easily, immunocompromised  Neuro: dizziness, light headedness, headaches  Psych: anxious, depressed, anger issues    Attention: This note has been scribed by Sasha Thomson under the supervision of Jad Little MD.     Jad Little MD   Hand, Wrist, & Elbow Surgery  [email or web address]  t: [phone]  f: [phone]

## 2024-03-07 ENCOUNTER — TELEPHONE (OUTPATIENT)
Dept: ORTHOPEDICS CLINIC | Facility: CLINIC | Age: 49
End: 2024-03-07

## 2024-03-07 DIAGNOSIS — G56.21 CUBITAL TUNNEL SYNDROME ON RIGHT: ICD-10-CM

## 2024-03-07 DIAGNOSIS — M77.01 MEDIAL EPICONDYLITIS OF RIGHT ELBOW: ICD-10-CM

## 2024-03-07 DIAGNOSIS — G56.21 ULNAR NEUROPATHY AT ELBOW OF RIGHT UPPER EXTREMITY: Primary | ICD-10-CM

## 2024-03-07 DIAGNOSIS — G56.21 ULNAR NEUROPATHY AT ELBOW OF RIGHT UPPER EXTREMITY: ICD-10-CM

## 2024-03-07 DIAGNOSIS — M77.01 MEDIAL EPICONDYLITIS, RIGHT ELBOW: ICD-10-CM

## 2024-03-07 NOTE — TELEPHONE ENCOUNTER
Date of Surgery: 24       Post Op Appt:  24 @     Case ID: [id number]    Notes:       SURGICAL BOOKING SHEET   Name: Bryant Mariscal  MRN: [id number]   : 1975     Surgical Date:    24   Surgical Consent:    Neurolysis of right ulnar nerve at elbow with nerve wrapping   Diagnosis:         ICD-10-CM   1. Ulnar neuropathy at elbow of right upper extremity  G56.21       Procedure Codes:    Cubital tunnel release (CPT 12191  Nerve wrap (CPT 96460)   Disposition:    Outpatient   Operative Time:    1 hr   Antibiotics:    Ancef 2g   Anesthesia Type:     Monitored Anesthesia Care (MAC) and Regional - Supraclavicular   Clearance:     NONE   Equipment:    Sterile Tourniquet, Axogen Nerve Wrap, Checkpoint nerve stimulator (standby)1% lidocaine with epinephrine, Vessel loops, Suture: 0 Vicryl UR-6,  3-0 Monocryl, Skin glue & Steri-Strips , and Sling   Assistant:    Sidney Assistant: Yes, Giovanna Lane PA-C   Follow Up:     or 10-14 days post op with Giovanna   Pain Medication:    Norco 325-5mg   Therapy:    None

## 2024-03-07 NOTE — PROGRESS NOTES
SURGICAL BOOKING SHEET   Name: Bryant Mariscal  MRN: [id number]   : 1975    Surgical Date:   24   Surgical Consent:   Neurolysis of right ulnar nerve at elbow with nerve wrapping   Diagnosis:      ICD-10-CM   1. Ulnar neuropathy at elbow of right upper extremity  G56.21       Procedure Codes:   Cubital tunnel release (CPT 11698  Nerve wrap (CPT 38914)   Disposition:   Outpatient   Operative Time:   1 hr   Antibiotics:   Ancef 2g   Anesthesia Type:    Monitored Anesthesia Care (MAC) and Regional - Supraclavicular   Clearance:    NONE   Equipment:   Sterile Tourniquet, Axogen Nerve Wrap, Checkpoint nerve stimulator (standby)1% lidocaine with epinephrine, Vessel loops, Suture: 0 Vicryl UR-6,  3-0 Monocryl, Skin glue & Steri-Strips , and Sling   Assistant:   Sidney Assistant: Yes, Giovanna Lane PA-C   Follow Up:    or 10-14 days post op with Giovanna   Pain Medication:   Norco 325-5mg   Therapy:   None

## 2024-03-08 RX ORDER — MELOXICAM 15 MG/1
15 TABLET ORAL DAILY
Qty: 30 TABLET | Refills: 0 | Status: SHIPPED | OUTPATIENT
Start: 2024-03-08

## 2024-03-08 NOTE — TELEPHONE ENCOUNTER
Meloxicam 15 mg  DOS: 03/15/23  Last OV: 03/06/24  Last refill date: 02/02/24     #/refills: 30/0  Upcoming appt: No future appointments.

## 2024-03-13 NOTE — TELEPHONE ENCOUNTER
Spoke with Bryant in regards to his upcoming surgery, however he states that he needs to postpone his surgery due to workers comp wanting him to complete and JM. He states that he does have a date scheduled for this being 4/18/24. I did let him know that I would send a message out to Dr. Little in regards to this to see when we are able to get him rescheduled for surgery. I did let him know once I receive a response back I will give him a call back to confirm a new date for surgery with him. He states understanding with no further concerns or questions.

## 2024-03-25 NOTE — TELEPHONE ENCOUNTER
CALLED AND SPOKE WITH PATIENT AND HE WILL CALL US WHEN HE IS READY TO SCHEDULE AGAIN.   PATIENT WANTS TO WAIT UNTIL WE KNOW HE CAN HAVE SURGERY

## 2024-03-25 NOTE — TELEPHONE ENCOUNTER
I LET PATIENT KNOW HE CAN HAVE SURGERY MAY 6 OR 7TH. HE STILL WOULD LIKE TO WAIT BEFORE HE SCHEDULES

## 2024-04-11 ENCOUNTER — OFFICE VISIT (OUTPATIENT)
Dept: OCCUPATIONAL MEDICINE | Age: 49
End: 2024-04-11
Attending: PHYSICIAN ASSISTANT

## 2024-04-11 ENCOUNTER — HOSPITAL ENCOUNTER (OUTPATIENT)
Dept: GENERAL RADIOLOGY | Age: 49
Discharge: HOME | End: 2024-04-11
Attending: PHYSICIAN ASSISTANT

## 2024-04-11 DIAGNOSIS — S46.912A ELBOW STRAIN, LEFT, INITIAL ENCOUNTER: ICD-10-CM

## 2024-04-11 DIAGNOSIS — S46.912A LEFT SHOULDER STRAIN, INITIAL ENCOUNTER: Primary | ICD-10-CM

## 2024-04-11 DIAGNOSIS — S46.912A LEFT SHOULDER STRAIN, INITIAL ENCOUNTER: ICD-10-CM

## 2024-04-11 PROCEDURE — 73080 X-RAY EXAM OF ELBOW: CPT | Performed by: PHYSICIAN ASSISTANT

## 2024-04-11 PROCEDURE — 73030 X-RAY EXAM OF SHOULDER: CPT | Performed by: PHYSICIAN ASSISTANT

## 2024-04-18 ENCOUNTER — OFFICE VISIT (OUTPATIENT)
Dept: OCCUPATIONAL MEDICINE | Age: 49
End: 2024-04-18
Attending: PHYSICIAN ASSISTANT

## 2024-04-18 DIAGNOSIS — M77.12 LATERAL EPICONDYLITIS, LEFT ELBOW: ICD-10-CM

## 2024-04-18 DIAGNOSIS — M24.812 INTERNAL DERANGEMENT OF LEFT SHOULDER: Primary | ICD-10-CM

## 2024-04-25 ENCOUNTER — TELEPHONE (OUTPATIENT)
Dept: ORTHOPEDICS CLINIC | Facility: CLINIC | Age: 49
End: 2024-04-25

## 2024-04-25 NOTE — TELEPHONE ENCOUNTER
Patient is coming in for right elbow. X-rays in epic from 4/11/24. Please advise if updated imaging is needed.  Future Appointments   Date Time Provider Department Center   6/19/2024  8:30 AM Jad Little MD [facility]

## 2024-05-01 ENCOUNTER — OFFICE VISIT (OUTPATIENT)
Dept: OCCUPATIONAL MEDICINE | Age: 49
End: 2024-05-01
Attending: FAMILY MEDICINE

## 2024-05-09 ENCOUNTER — PATIENT MESSAGE (OUTPATIENT)
Dept: ORTHOPEDICS CLINIC | Facility: CLINIC | Age: 49
End: 2024-05-09

## 2024-05-09 DIAGNOSIS — G56.21 CUBITAL TUNNEL SYNDROME ON RIGHT: Primary | ICD-10-CM

## 2024-05-09 NOTE — TELEPHONE ENCOUNTER
From: Bryant Mariscal  To: Jad Little  Sent: 5/9/2024 9:55 AM CDT  Subject: JM results     Good morning doctor Sidney and staff. I just following if you guys receive the fax from the  nurse about the JM results. Thank you

## 2024-05-10 ENCOUNTER — TELEPHONE (OUTPATIENT)
Dept: ORTHOPEDICS CLINIC | Facility: CLINIC | Age: 49
End: 2024-05-10

## 2024-05-10 DIAGNOSIS — T14.90XA INJURY: Primary | ICD-10-CM

## 2024-05-13 ENCOUNTER — OFFICE VISIT (OUTPATIENT)
Dept: ORTHOPEDICS CLINIC | Facility: CLINIC | Age: 49
End: 2024-05-13

## 2024-05-13 VITALS — HEIGHT: 72 IN | WEIGHT: 280 LBS | BODY MASS INDEX: 37.93 KG/M2

## 2024-05-13 DIAGNOSIS — M77.12 LATERAL EPICONDYLITIS OF LEFT ELBOW: ICD-10-CM

## 2024-05-13 DIAGNOSIS — S43.432A SUPERIOR GLENOID LABRUM LESION OF LEFT SHOULDER, INITIAL ENCOUNTER: Primary | ICD-10-CM

## 2024-05-13 PROCEDURE — 20610 DRAIN/INJ JOINT/BURSA W/O US: CPT | Performed by: ORTHOPAEDIC SURGERY

## 2024-05-13 PROCEDURE — 99214 OFFICE O/P EST MOD 30 MIN: CPT | Performed by: ORTHOPAEDIC SURGERY

## 2024-05-13 RX ORDER — TRIAMCINOLONE ACETONIDE 40 MG/ML
40 INJECTION, SUSPENSION INTRA-ARTICULAR; INTRAMUSCULAR ONCE
Status: COMPLETED | OUTPATIENT
Start: 2024-05-13 | End: 2024-05-13

## 2024-05-13 RX ORDER — KETOROLAC TROMETHAMINE 30 MG/ML
30 INJECTION, SOLUTION INTRAMUSCULAR; INTRAVENOUS ONCE
Status: COMPLETED | OUTPATIENT
Start: 2024-05-13 | End: 2024-05-13

## 2024-05-13 RX ADMIN — KETOROLAC TROMETHAMINE 30 MG: 30 INJECTION, SOLUTION INTRAMUSCULAR; INTRAVENOUS at 12:41:00

## 2024-05-13 RX ADMIN — TRIAMCINOLONE ACETONIDE 40 MG: 40 INJECTION, SUSPENSION INTRA-ARTICULAR; INTRAMUSCULAR at 12:41:00

## 2024-05-13 NOTE — H&P
Orthopaedic Surgery  [address] [place] [id number]  [phone]     NEW PATIENT VISIT - HISTORY AND PHYSICAL EXAMINATION     Name: Bryant Mariscal   MRN: [id number]  Date: 5/13/2024     CC: Left shoulder and elbow injury    REFERRED BY: Doron Mueller MD    HPI:   Bryant Mariscal is a very pleasant 48 year old right-hand dominant male who presents today for evaluation of left shoulder and elbow injury sustained April 11, 2024. 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 In an attempt to pull on the branch, he heard a pop and felt pain immediately after. Pain is 5/10 with limitations in movement.    He is well known to our team for conservative treatment of right elbow ulnar neuropathy.      PMH:   Past Medical History:    ADD (attention deficit disorder)    Anesthesia complication    severe headaches every time he gets anesthesia    Essential hypertension    Focal hemorrhagic contusion of cerebrum (HCC)    Intraparenchymal hemorrhage of brain (HCC)    Migraines    Obstructive sleep apnea (adult) (pediatric)    AHI 8 REM AHI 36 SaO2 tiarra 73 % CPAP 10 Premier     Osteoarthrosis, unspecified whether generalized or localized, unspecified site    Sleep apnea    CPAP    Spondylosis of lumbar region without myelopathy or radiculopathy       PAST SURGICAL HX:  Past Surgical History:   Procedure Laterality Date    Ankle fracture surgery      Drain/inject large joint/bursa Bilateral 10/26/2015    Procedure: BURSA INJECTION;  Surgeon: Taco Rea MD;  Location: [facility]    Fluoroscopic guidance needle placement Bilateral 10/26/2015    Procedure: BURSA INJECTION;  Surgeon: Taco Rea MD;  Location: [facility]    Other Left 09/2017    work injury , left wrist     Other surgical history      left wrist     Other surgical history Right 03/15/2023    elbow repair       FAMILY HX:  Family History   Problem Relation Age of Onset     Personality Disorder Mother     Depression Mother     Anxiety Mother        ALLERGIES:  Patient has no known allergies.    MEDICATIONS:   Current Outpatient Medications   Medication Sig Dispense Refill    Meloxicam 15 MG Oral Tab Take 1 tablet (15 mg total) by mouth daily. 30 tablet 0    PEG 3350-KCl-Na Bicarb-NaCl (TRILYTE) 420 g Oral Recon Soln Starting at 4:00 pm the night before procedure, drink 8 ounces of the prep every 15-20 minutes until finished (Patient not taking: Reported on 5/13/2024) 1 each 0    Tadalafil 10 MG Oral Tab Take 1 tablet (10 mg total) by mouth daily as needed for Erectile Dysfunction. 20 tablet 3       ROS: A comprehensive 14 point review of systems was performed and was negative aside from the aforementioned per history of present illness.    SOCIAL HX:  Social History     Tobacco Use    Smoking status: Never    Smokeless tobacco: Never   Substance Use Topics    Alcohol use: Yes     Comment: Rarely       PE:   Vitals:    05/13/24 1142   Weight: 280 lb   Height: 6' (1.829 m)     Estimated body mass index is 37.97 kg/m² as calculated from the following:    Height as of this encounter: 6' (1.829 m).    Weight as of this encounter: 280 lb.    Physical Exam  Constitutional:       Appearance: Normal appearance.   HENT:      Head: Normocephalic and atraumatic.   Eyes:      Extraocular Movements: Extraocular movements intact.   Neck:      Musculoskeletal: Normal range of motion and neck supple.   Cardiovascular:      Pulses: Normal pulses.   Pulmonary:      Effort: Pulmonary effort is normal. No respiratory distress.   Abdominal:      General: There is no distension.   Skin:     General: Skin is warm.      Capillary Refill: Capillary refill takes less than 2 seconds.      Findings: No bruising.   Neurological:      General: No focal deficit present.      Mental Status: Alert.   Psychiatric:         Mood and Affect: Mood normal.     Examination of the left shoulder demonstrates:   Skin is intact,  warm and dry.   Cervical:  Full ROM  Spurling's  Negative    Deformity:   none  Atrophy:   none    Scapular winging: Negative    Palpation:     AC Joint   Negative  Biceps Tendon  Positive  Greater Tuberosity Negative    ROM:   Forward Flexion:  150°  Abduction:   full and symmetric  External Rotation:  full and symmetric  Internal Rotation:  full and symmetric    Rotator Cuff Strength:   Supraspinatus:   5/5  Subscapularis:   5/5  Infraspinatus/Teres: 5/5    Provocative Tests:   Lee:   Negative  Speed's:   Positive  Ukiah's:   Positive  Lift-off:    Negative  Apprehension:  Negative  Sulcus Sign:   Negative    Neurovascular Upper Extremity (Bilateral)  Motor:    5/5 EPL, Finger Abduction, , Pinch, Deltoid  Sensation:   intact to light touch median, ulnar, radial and axillary nerve  Circulation:   Normal, 2+ radial pulse    The contralateral upper extremity is without limitation in range of motion or strength, no positive provocative maneuvers.     Radiographic Examination/Diagnostics:    Shoulder and elbow XR personally viewed, independently interpreted and radiology report was reviewed.    XR SHOULDER, COMPLETE (MIN 2 VIEWS), LEFT (CPT=73030)    Result Date: 4/11/2024  PROCEDURE:  XR SHOULDER, COMPLETE (MIN 2 VIEWS), LEFT (CPT=73030) TECHNIQUE:  Multiple views were obtained. COMPARISON:  None. INDICATIONS:  S46.912A Elbow strain, left, initial encounter PATIENT STATED HISTORY: (As transcribed by Technologist)  Patient states at work today he was raking branches and one was still stuck in the ground so pulled really hard and now has left shoulder joint pain along with posterior lateral pain to the elbow. Limited range of movement.      CONCLUSION:    Negative for fracture or malalignment.  The glenohumeral joint space is preserved.  Mild acromioclavicular joint arthrosis with preservation of the subacromial interval and coracoclavicular distance.  The visualized bony thorax and regional soft tissues  are  within normal limits.   LOCATION:  [facility] Dictated by (CST): Ez Foster MD on 4/11/2024 at 12:30 PM Finalized by (CST): Ez Foster MD on 4/11/2024 at 12:31 PM       XR ELBOW, COMPLETE (MIN 3 VIEWS), LEFT (CPT=73080)    Result Date: 4/11/2024  PROCEDURE:  XR ELBOW, COMPLETE (MIN 3 VIEWS), LEFT (CPT=73080) TECHNIQUE:  Three views were obtained. COMPARISON:  None. INDICATIONS:  S46.912A Left shoulder strain, initial encounter PATIENT STATED HISTORY: (As transcribed by Technologist)  Patient states at work today he was raking branches and one was still stuck in the ground so pulled really hard and now has left shoulder joint pain along with posterior lateral pain to the elbow. Limited range of movement.      CONCLUSION:    Negative for fracture or malalignment.  Normal mineralization.  Joint spaces are preserved.  No elbow joint effusion or focal soft tissue swelling.  LOCATION:  [facility] Dictated by (CST): Ez Foster MD on 4/11/2024 at 12:30 PM    Finalized by (CST): Ez Foster MD on 4/11/2024 at 12:30 PM          MRI of the left shoulder, obtained at outside facility, independently reviewed:   Demonstrates linear SLAP tear of the left shoulder in the setting of mild subacromial bursitis / impingement. Intact rotator cuff.     IMPRESSION: Bryant Mariscal is a 48 year old Right hand dominant male with left superior glenoid labrum lesion / SLAP tear and lateral epicondylitis sustained April 11, 2024 while raking branched at work.    We elected to maximize conservative management with left shoulder intra-articular corticosteroid and ketorolac injection coupled with physical therapy for both shoulder and elbow.     PLAN:   We had a detailed discussion outlining the etiology, anatomy, pathophysiology, and natural history of patient's findings. Imaging was reviewed in detail and correlated to a 3-dimensional model of the shoulder.     We reviewed the treatment of this disease condition.  Fortunately, treatment is  amenable to conservative treatment which we chose to optimize at today's visit.  After a discussion of a variety of conservative treatment options, I recommended intra-articular injection with corticosteroid and ketorolac coupled with physical therapy to aid in strengthening, range of motion, functional improvement, and return to baseline activity.       We elected to proceed with the injection procedure at today's visit. We discussed the risk and benefits of the procedure; including, but not limited to: infection, injury to blood vessels, nerve injury, prolonged pain, swelling, site soreness, failure to progress, and need for advanced treatments.  The patient voiced understanding and agreed to proceed with the treatment plan.      Patient was given a work note for light work duty without use of the left upper extremity.     I spent 30 minutes in preparation to see the patient, counseling/education of relevant pathology, discussing imaging results, administering injection, therapy referral, care coordination, and documentation into the electronic medical record.    FOLLOW-UP:   Proceed with physical therapy and return for repeat evaluation in 6-10 weeks. No imaging required at next visit.       Ede Sarmiento MD  Knee, Shoulder, & Elbow Surgery / Sports Medicine Specialist  Orthopaedic Surgery  [address] [place] [address] [id number] [place] [email or web address]  [email or web address]  t: [phone]  o: [phone]  f: [phone]    This note was dictated using Dragon software.  While it was briefly proofread prior to completion, some grammatical, spelling, and word choice errors due to dictation may still occur.

## 2024-05-13 NOTE — PROCEDURES
Left Shoulder Glenohumeral Joint Injection    Name: Bryant Mariscal   MRN: [id number]  Date: 5/13/2024     Clinical Indications:   SLAP tear    After informed consent, the injection site was marked, sterilized with topical chlorhexidine antiseptic, and locally anesthetized with skin refrigerant.    The patient was seated upright and the shoulder was exposed. Using sterile technique: 1 mL of 30mg/mL of Ketorolac, 2 mL of 0.5% Bupivicaine, 2 mL of 1% Lidocaine, and 1 mg of 40mg/mL of Triamcinolone (Kenalog) was injected with a Anterior approach utilizing a 22 gauge needle.  A band-aid was applied.  The patient tolerated the procedure well.        Ede Sarmiento MD  Knee, Shoulder, & Elbow Surgery / Sports Medicine Specialist  Orthopaedic Surgery  [address] [place] [address].org  [email or web address]  t: [phone]  o: [phone]  f: [phone]         40

## 2024-05-23 ENCOUNTER — ANESTHESIA (OUTPATIENT)
Dept: ENDOSCOPY | Facility: HOSPITAL | Age: 49
End: 2024-05-23

## 2024-05-23 ENCOUNTER — ANESTHESIA EVENT (OUTPATIENT)
Dept: ENDOSCOPY | Facility: HOSPITAL | Age: 49
End: 2024-05-23

## 2024-05-23 ENCOUNTER — HOSPITAL ENCOUNTER (OUTPATIENT)
Facility: HOSPITAL | Age: 49
Setting detail: HOSPITAL OUTPATIENT SURGERY
Discharge: HOME | End: 2024-05-23
Attending: STUDENT IN AN ORGANIZED HEALTH CARE EDUCATION/TRAINING PROGRAM | Admitting: STUDENT IN AN ORGANIZED HEALTH CARE EDUCATION/TRAINING PROGRAM

## 2024-05-23 VITALS
TEMPERATURE: 98 F | DIASTOLIC BLOOD PRESSURE: 64 MMHG | WEIGHT: 273 LBS | RESPIRATION RATE: 18 BRPM | SYSTOLIC BLOOD PRESSURE: 114 MMHG | HEIGHT: 72 IN | HEART RATE: 46 BPM | BODY MASS INDEX: 36.98 KG/M2 | OXYGEN SATURATION: 95 %

## 2024-05-23 DIAGNOSIS — Z12.11 ENCOUNTER FOR SCREENING COLONOSCOPY: ICD-10-CM

## 2024-05-23 PROCEDURE — 0DJD8ZZ INSPECTION OF LOWER INTESTINAL TRACT, VIA NATURAL OR ARTIFICIAL OPENING ENDOSCOPIC: ICD-10-PCS | Performed by: STUDENT IN AN ORGANIZED HEALTH CARE EDUCATION/TRAINING PROGRAM

## 2024-05-23 RX ORDER — SODIUM CHLORIDE, SODIUM LACTATE, POTASSIUM CHLORIDE, CALCIUM CHLORIDE 600; 310; 30; 20 MG/100ML; MG/100ML; MG/100ML; MG/100ML
INJECTION, SOLUTION INTRAVENOUS CONTINUOUS
Status: DISCONTINUED | OUTPATIENT
Start: 2024-05-23 | End: 2024-05-23

## 2024-05-23 RX ORDER — LIDOCAINE HYDROCHLORIDE 10 MG/ML
INJECTION, SOLUTION EPIDURAL; INFILTRATION; INTRACAUDAL; PERINEURAL AS NEEDED
Status: DISCONTINUED | OUTPATIENT
Start: 2024-05-23 | End: 2024-05-23 | Stop reason: SURG

## 2024-05-23 RX ADMIN — LIDOCAINE HYDROCHLORIDE 50 MG: 10 INJECTION, SOLUTION EPIDURAL; INFILTRATION; INTRACAUDAL; PERINEURAL at 09:27:00

## 2024-05-23 RX ADMIN — SODIUM CHLORIDE, SODIUM LACTATE, POTASSIUM CHLORIDE, CALCIUM CHLORIDE: 600; 310; 30; 20 INJECTION, SOLUTION INTRAVENOUS at 10:07:00

## 2024-05-23 NOTE — H&P
New Patient Visit Note     Active Problems      1. Encounter for screening colonoscopy          Chief Complaint   Patient presents with:  Colonoscopy: NP- Cscope Consult- denies family hx of colon cancer, denies symptoms, denies previous cscopes        PCP  Doron Mueller MD      History of Present Illness   Bryant Mariscal is a 48 year old male who presents for evaluation for colonoscopy.     The patient has not had previous colonoscopy.      The patient's family history is negative .     The patient denies nausea, vomiting, abdominal pain or cramping or bloating, diarrhea, constipation, bright red blood in the stool, dark tarry stools, other recent changes in bowel habits, or recent weight loss.     The patient's past medical history   Past Medical History        Past Medical History:   Diagnosis Date    ADD (attention deficit disorder)      Anesthesia complication       severe headaches every time he gets anesthesia    Essential hypertension      Focal hemorrhagic contusion of cerebrum (HCC)      Intraparenchymal hemorrhage of brain (HCC)      Obstructive sleep apnea (adult) (pediatric) [facility] 2-10-17 [place] 4-14-17     AHI 8 REM AHI 36 SaO2 tiarra 73 % CPAP 10 Premier     Osteoarthrosis, unspecified whether generalized or localized, unspecified site      Sleep apnea       CPAP    Spondylosis of lumbar region without myelopathy or radiculopathy                The patient's past surgical history   Past Surgical History         Past Surgical History:   Procedure Laterality Date    ANKLE FRACTURE SURGERY        DRAIN/INJECT LARGE JOINT/BURSA Bilateral 10/26/2015     Procedure: BURSA INJECTION;  Surgeon: Taco Rea MD;  Location: [facility]    FLUOROSCOPIC GUIDANCE NEEDLE PLACEMENT Bilateral 10/26/2015     Procedure: BURSA INJECTION;  Surgeon: Taco Rea MD;  Location: [facility]    OTHER Left 09/2017     work injury , left wrist     OTHER SURGICAL HISTORY          left wrist                The patient takes no blood thinners..        Past Medical / Surgical / Social / Family History    The past medical history, past surgical history, family history, social history, allergies, and medications have been reviewed by me today.       Medications Ordered Prior to this Encounter   MELOXICAM 15 MG Oral Tab, TAKE 1 TABLET(15 MG) BY MOUTH EVERY DAY FOR 4 WEEKS  Tadalafil 10 MG Oral Tab, Take 1 tablet (10 mg total) by mouth daily as needed for Erectile Dysfunction.     No current facility-administered medications on file prior to visit.         Review of Systems  Constitutional: No Fever, No Chills, No Diaphoresis, No fatigue, No weight loss, No anorexia  Eyes: No burry vision, No icterus  ENT: No tinnitus, No rhinorrhea, No dysphagia, No odynophagia  Respiratory: No dyspnea, No wheezing  Cardiovascular: No chest pain, No palpitations, No leg swelling  Gastrointestinal: No abdominal pain, No abdominal distention, No bloating, No nausea, No vomiting, No diarrhea, No constipation, No melena, No hematochezia  Endocrine: No polydipsia, No polyuria  Genitoruinary: No dysuria, No Hematuria  Musculoskeletal: No joint pain, No muscle pain  Neurologic: No dizzyness, No syncope, No headaches, No numbness  Hematologic: No easy bruising, No easy bleeding  Psychiatric: No anxiety, No depression        Physical Findings    Constitutional: Well nourished, well kempt  Eyes: EOMI, no scleral icterus  ENT: Nares moist, oropharynx clear  Neck: Supple, no tracheal deviation   Cardiac: Normal rate, no JVD  Respiratory: No respiratory distress, No audible wheezing  Abdominal: Soft, Nontender, Nondistended  Muskuloskeletal: Normal gait, Full ROM in all extremities  Lymphatic: no cervical or supraclavicular adenopathy  Skin: No rashes,  No lesions  Neurologic: No focal deficits  Psych: Appropriate mood and affect, oriented x3           Assessment   Encounter for screening colonoscopy  (primary encounter  diagnosis)        Plan   The patient is 48 year old and has not had a colonoscopy. The patient's family history is negative. The patient does not have gastrointestinal symptoms.      Recommend proceeding with colonoscopy.     The benefits of colonoscopy, including detection of cancer and polyp removal prior to progression to cancer were discussed. The details of the procedure which include navigation of the colonoscope through the anus, rectum, and colon to evaluate the mucosa and removal of any polyps encountered were discussed as well. The risks of colonoscopy were discussed with the patient and include but are not limited to post-procedure bleeding, infection, colorectal perforation, splenic injury, missed polyps, need for additional surgeries or interventions. All questions were answered and the patient voiced understanding and agreed to proceed with colonoscopy.                 Toya Linn MD

## 2024-05-23 NOTE — ANESTHESIA POSTPROCEDURE EVALUATION
[facility]    Bryant Mariscal Patient Status:  Hospital Outpatient Surgery   Age/Gender 48 year old male MRN [id number]   Location [facility] Attending Toya Linn MD   Hosp Day # 0 PCP Doron Mueller MD       Anesthesia Post-op Note    COLONOSCOPY    Procedure Summary       Date: 05/23/24 Room / Location:  ENDOSCOPY 04 / [facility]    Anesthesia Start: 0924 Anesthesia Stop: 1007    Procedure: COLONOSCOPY Diagnosis:       Encounter for screening colonoscopy      (diverticulosis, normal)    Surgeons: Toya Linn MD Anesthesiologist: Myerson, David, MD    Anesthesia Type: MAC ASA Status: 2            Anesthesia Type: MAC    Vitals Value Taken Time   /61 05/23/24 1008   Temp 97.6 °F (36.4 °C) 05/23/24 1007   Pulse 52 05/23/24 1008   Resp 16 05/23/24 1007   SpO2 96 % 05/23/24 1008   Vitals shown include unfiled device data.    Patient Location: Endoscopy    Anesthesia Type: MAC    Airway Patency: patent    Postop Pain Control: adequate    Mental Status: mildly sedated but able to meaningfully participate in the post-anesthesia evaluation    Nausea/Vomiting: none    Cardiopulmonary/Hydration status: stable euvolemic    Complications: no apparent anesthesia related complications    Postop vital signs: stable    Dental Exam: Unchanged from Preop    Patient to be discharged home when criteria met.

## 2024-05-23 NOTE — OPERATIVE REPORT
[facility]  Operative Note    Bryant Mariscal Location: OR   [facility] [id number] MRN [id number]    1975 Age 48 year old   Admission Date 2024 Operation Date 2024   Attending Physician Toya Linn MD Operating Physician Toya Linn MD   PCP Doron Mueller MD          Patient Name: Bryant Mariscal    Preoperative Diagnosis: Encounter for screening colonoscopy [Z12.11]    Postoperative Diagnosis: Same as pre-op diagnosis.    Primary Surgeon: Toya Linn MD    Anesthesia: MAC    Anesthesiologist: Anesthesiologist.: Myerson, David, MD    Procedures: Colonoscopy to the cecum     Specimen:     Estimated Blood Loss: None    Complications: None immediate    Condition: Good    Indications for Surgery:   Bryant Mariscal is a 48 year old male here for screening colonoscopy    Surgical Findings:   The quality of the bowel prep was good. 2,2,3    Description of Procedure:   The Patient was taken to the endoscopy suite and positioned in the left lateral decubitus position with knees flexed. Monitored anesthesia care was administered. A time-out was performed.    The perineum and perianal skin were examined. A digital rectal examination was performed. No masses felt. A well-lubricated adult colonoscope was then inserted and carefully navigated to the cecum. Advancement was accomplished with moderate difficulty . The appendiceal orifice and ileocecal valve were identified and photographed. The terminal ileum was not intubated. The scope was then withdrawn as the mucosa was circumferentially examined. Normal colon and rectum was noted. In the rectum the scope was retroflexed to evaluate the anorectal junction.  The scope was straightened and excess gas was suctioned from the colon and the scope removed, terminating the procedure.    Anesthesia was terminated and the patient transported to the recovery unit in good condition. The patient tolerated the procedure well without apparent  intraoperative complication.    Follow up:   Patient will need next colonoscopy 10 years.       Toya Linn MD  5/23/2024  10:05 AM

## 2024-05-23 NOTE — DISCHARGE INSTRUCTIONS
Home Care Instructions for Colonoscopy with Sedation    Diet:  - Resume your regular diet   - Start with light meals to minimize bloating.  - Do not drink alcohol today.    Medication:  - If you have questions about resuming your normal medications, please contact your Primary Care Physician.    Activities:  - Take it easy today. Do not return to work today.  - Do not drive today.  - Do not operate any machinery today (including kitchen equipment).    Colonoscopy:  - You may notice some rectal \"spotting\" (a little blood on the toilet tissue) for a day or two after the exam. This is normal.  - If you experience any rectal bleeding (not spotting), persistent tenderness or sharp severe abdominal pains, oral temperature over 100 degrees Fahrenheit, light-headedness or dizziness, or any other problems, contact your doctor.    **If unable to reach your doctor, please go to the [facility] Emergency Room**    - Your referring physician will receive a full report of your examination.  - If you do not hear from your doctor's office within two weeks of your biopsy, please call them for your results.

## 2024-05-23 NOTE — ANESTHESIA PREPROCEDURE EVALUATION
PRE-OP EVALUATION    Patient Name: Bryant Mariscal    Admit Diagnosis: Encounter for screening colonoscopy [Z12.11]    Pre-op Diagnosis: Encounter for screening colonoscopy [Z12.11]    COLONOSCOPY    Anesthesia Procedure: COLONOSCOPY    Surgeons and Role:     * Toya Linn MD - Primary    Pre-op vitals reviewed.        Body mass index is 37.97 kg/m².    Current medications reviewed.  Hospital Medications:  No current facility-administered medications on file as of .       Outpatient Medications:     No medications prior to admission.       Allergies: Patient has no known allergies.      Anesthesia Evaluation    Patient summary reviewed.    Anesthetic Complications  (-) history of anesthetic complications         GI/Hepatic/Renal    Negative GI/hepatic/renal ROS.                             Cardiovascular        Exercise tolerance: good     MET: >4      (+) hypertension                                     Endo/Other    Negative endo/other ROS.                              Pulmonary                    (+) sleep apnea       Neuro/Psych                   (+) headaches           Patient Active Problem List:     Bursitis of both hips     Trochanteric bursitis of both hips     DDD (degenerative disc disease), lumbar     Spondylosis of lumbar region without myelopathy or radiculopathy     ADD (attention deficit disorder)     RADHA on CPAP     Sprain of left ankle, unspecified ligament, initial encounter     Tear of peroneal tendon, left, initial encounter     Severe obesity (BMI >= 40) (HCC)     BLANCHE (generalized anxiety disorder)     Moderate episode of recurrent major depressive disorder (HCC)     Anxiety and depression     Focal hemorrhagic contusion of cerebrum (HCC)     Closed fracture of left orbital floor, initial encounter (HCC)     Prepatellar bursitis, right knee     Effusion of knee joint right     Severe ankle sprain, right, initial encounter     Essential hypertension     Obstructive sleep apnea (adult)  (pediatric)     Intraparenchymal hemorrhage of brain (HCC)            Past Surgical History:   Procedure Laterality Date    Ankle fracture surgery      Drain/inject large joint/bursa Bilateral 10/26/2015    Procedure: BURSA INJECTION;  Surgeon: Taco Rea MD;  Location: [facility]    Fluoroscopic guidance needle placement Bilateral 10/26/2015    Procedure: BURSA INJECTION;  Surgeon: Taco Rea MD;  Location: [facility]    Other Left 09/2017    work injury , left wrist     Other surgical history      left wrist     Other surgical history Right 03/15/2023    elbow repair     Social History     Socioeconomic History    Marital status:    Tobacco Use    Smoking status: Never    Smokeless tobacco: Never   Vaping Use    Vaping status: Never Used   Substance and Sexual Activity    Alcohol use: Yes     Comment: Rarely    Drug use: No     History   Drug Use No     Available pre-op labs reviewed.               Airway      Mallampati: II  Mouth opening: >3 FB  TM distance: > 6 cm  Neck ROM: full Cardiovascular    Cardiovascular exam normal.         Dental    Dentition appears grossly intact         Pulmonary    Pulmonary exam normal.                 Other findings              ASA: 2   Plan: MAC  NPO status verified and patient meets guidelines.    Post-procedure pain management plan discussed with surgeon and patient.      Plan/risks discussed with: patient                Present on Admission:  **None**

## 2024-06-06 ENCOUNTER — PROCEDURE VISIT (OUTPATIENT)
Dept: PHYSICAL MEDICINE AND REHAB | Facility: CLINIC | Age: 49
End: 2024-06-06
Payer: OTHER MISCELLANEOUS

## 2024-06-06 DIAGNOSIS — R20.0 NUMBNESS AND TINGLING: Primary | ICD-10-CM

## 2024-06-06 DIAGNOSIS — R20.2 NUMBNESS AND TINGLING: Primary | ICD-10-CM

## 2024-06-06 PROCEDURE — 95909 NRV CNDJ TST 5-6 STUDIES: CPT | Performed by: PHYSICAL MEDICINE & REHABILITATION

## 2024-06-06 PROCEDURE — 95886 MUSC TEST DONE W/N TEST COMP: CPT | Performed by: PHYSICAL MEDICINE & REHABILITATION

## 2024-06-06 NOTE — PROGRESS NOTES
[facility]  Electromyography Consultation      History of Present Illness:    Dear Dr. Little,  Thank you for the opportunity to see Bryant Mariscal for electrodiagnostic consultation today. As you know the patient is a 48 year old male with a chief complaint of numbness and tingling of the ulnar digits of the right hand.  He has a history of right elbow medial epicondyle debridement and flexor tendon repair with ulnar nerve transposition in March 2023.  Recently he has had recurrence of symptoms thought to be due to scar tissue formation.  He was scheduled for surgery which was delayed due to to an JM requiring an EMG prior to further surgery.  He is here today for that.    PAST MEDICAL HISTORY:  Past Medical History:    ADD (attention deficit disorder)    Anesthesia complication    severe headaches every time he gets anesthesia    Essential hypertension    Focal hemorrhagic contusion of cerebrum (HCC)    Intraparenchymal hemorrhage of brain (HCC)    Migraines    Obstructive sleep apnea (adult) (pediatric)    AHI 8 REM AHI 36 SaO2 tiarra 73 % CPAP 10 Premier     Osteoarthrosis, unspecified whether generalized or localized, unspecified site    Sleep apnea    CPAP    Spondylosis of lumbar region without myelopathy or radiculopathy       SURGICAL HISTORY:  Past Surgical History:   Procedure Laterality Date    Ankle fracture surgery      Colonoscopy N/A 5/23/2024    Procedure: COLONOSCOPY;  Surgeon: Toya Linn MD;  Location:  ENDOSCOPY    Drain/inject large joint/bursa Bilateral 10/26/2015    Procedure: BURSA INJECTION;  Surgeon: Taco Rea MD;  Location: [facility]    Fluoroscopic guidance needle placement Bilateral 10/26/2015    Procedure: BURSA INJECTION;  Surgeon: Taco Rea MD;  Location: [facility]    Other Left 09/2017    work injury , left wrist     Other surgical history      left wrist     Other  surgical history Right 03/15/2023    elbow repair       SOCIAL HISTORY:   Social History     Occupational History    Not on file   Tobacco Use    Smoking status: Never    Smokeless tobacco: Never   Vaping Use    Vaping status: Never Used   Substance and Sexual Activity    Alcohol use: Yes     Comment: Rarely    Drug use: No    Sexual activity: Not on file       FAMILY HISTORY:   Family History   Problem Relation Age of Onset    Personality Disorder Mother     Depression Mother     Anxiety Mother        CURRENT MEDICATIONS:   Current Outpatient Medications   Medication Sig Dispense Refill    Meloxicam 15 MG Oral Tab Take 1 tablet (15 mg total) by mouth daily. 30 tablet 0    PEG 3350-KCl-Na Bicarb-NaCl (TRILYTE) 420 g Oral Recon Soln Starting at 4:00 pm the night before procedure, drink 8 ounces of the prep every 15-20 minutes until finished 1 each 0    Tadalafil 10 MG Oral Tab Take 1 tablet (10 mg total) by mouth daily as needed for Erectile Dysfunction. 20 tablet 3       PHYSICAL EXAM:   There were no vitals taken for this visit.    There is no height or weight on file to calculate BMI.      General: No immediate distress   Extremities: peripheral pulses intact, no lower extremity edema bilaterally, well-healed medial right elbow incision  Skin: No lesions noted.   Neuro:   Strength: Upper extremities have 5/5 strength  Muscle bulk: No atrophy  Sensation: Normal upper extremities  Reflexes: Symmetrically areflexic upper extremities      EMG/NCV  Sensory NCS      Nerve / Sites Distance Segments Peak Lat NP Amp    cm  ms µV   R MEDIAN - Dig III Antidr      Wrist 14 Wrist - Dig III 5.55 10.4      Ref.  Ref. 3.80 20.0      Palm 7 Palm - Dig III 2.05 11.0   R ULNAR - Dig V Antidr      Wrist 14 Wrist - Dig V 3.35 16.9      Ref.  Ref. 3.80 10.0   L ULNAR - Dig V Antidr      Wrist 14 Wrist - Dig V 3.30 30.5      Ref.  Ref. 3.80 10.0       Motor NCS      Nerve / Sites Distance Segments Latency Amplitude Velocity Amp.1-2 Peak  Dur. Area    cm  ms mV m/s % ms mVms   R MEDIAN - APB      Wrist 8 Wrist - APB 5.35 9.6  100 5.80 31.3      Ref.  Ref. 4.40 4.0          Elbow  Elbow - Wrist 10.15 6.5  67.6 6.20 22.2      Ref.  Ref.   49.0         Elbow  Elbow - FDI 10.60 0.9  9.45 5.25 2.9   R ULNAR - ADM      Wrist 8 Wrist - ADM 2.60 7.2  100 6.30 28.6      Ref.  Ref. 4.20 5.0          B.Elbow 22.5 B.Elbow - Wrist 7.35 6.9 47.4 95.8 6.80 27.6      Ref.  Ref.   50.0         A.Elbow 10.5 A.Elbow - B.Elbow 9.15 6.7 58.3 93.5 6.95 27.8   R ULNAR - FDI      Wrist  Wrist - FDI 3.20 19.7  100 5.80 51.3      B.Elbow 22.5 B.Elbow - Wrist 7.45 18.5 52.9 93.8 6.10 51.6      A.Elbow 10.5 A.Elbow - B.Elbow 9.65 18.1 47.7 92.1 6.10 51.5       EMG Summary Table     Spontaneous MUAP Recruitment    IA Fib PSW Fasc H.F. Amp Dur. PPP Pattern   R. DELTOID N None None None None N N N N   R. TRICEPS N None None None None N N N N   R. BICEPS N None None None None N N N N   R. FLEX CARPI RAD N None None None None N N N N   R. FIRST D INTEROSS N None None None None N N N N   R. ABD POLL BREVIS N None None None None N N N N   R. FLEX DIG PROF IV N None None None None N N N N   R. ABD DIG MIN (UL) N None None None None N N N N       Findings: Extremities were warmed with hot packs for 15 minutes prior to testing.  Sensory nerve conduction studies revealed a right median response with prolonged latency and small amplitude.  Conduction delay across the transverse carpal ligament noted.  The bilateral ulnar sensory responses had symmetric latencies and amplitudes.  Motor nerve conduction studies revealed a prolonged right median distal latency with normal amplitudes and evidence of Harshil-Stewart anastomosis.  The right ulnar motor response to the epic chart digiti minimi had a slow distal forearm conduction velocity.  Latencies and amplitudes were normal without conduction block or conduction delay across the elbow.  The right ulnar motor response to the first dorsal  interosseous also exhibited no significant delay across the elbow or conduction block across the elbow.  Needle EMG of the left upper extremity revealed a normal study in all muscles tested including 3 ulnar innervated muscles.  Please refer to the EMG summary table above.  Impression:  1.  Abnormal study.  2.  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 This is characterized by demyelination of motor and sensory fibers along with sensory axon loss.  3.  Electrodiagnostic evidence is suggestive of but not diagnostic for right ulnar neuropathy at the elbow.  The only abnormality noted is decreased conduction velocity distally which in isolation is nondiagnostic.  Conduction slowing could be equally impacted by focal demyelination near the cubital tunnel or by measurement error due to surgical repositioning of the nerve.  4.  No electrodiagnostic evidence of right cervical radiculopathy.      ASSESSMENT AND PLAN:  1. Numbness and tingling  The patient has definitive evidence of right median neuropathy but is only suggestive of ulnar neuropathy on this EMG.  As EMG measures the electrophysiologic function of the nerve, it does not rule out ulnar neuropathy causing pain, which is a sensory modality not measured by EMG/NCS.        Thank you for the opportunity to participate in the care of this patient.  Sincerely,    Christofer Henderson M.D.  Diplomate [employer] Board of Physical Medicine and Rehabilitation

## 2024-06-17 ENCOUNTER — TELEPHONE (OUTPATIENT)
Dept: PHYSICAL MEDICINE AND REHAB | Facility: CLINIC | Age: 49
End: 2024-06-17

## 2024-06-19 ENCOUNTER — OFFICE VISIT (OUTPATIENT)
Dept: ORTHOPEDICS CLINIC | Facility: CLINIC | Age: 49
End: 2024-06-19

## 2024-06-19 VITALS — BODY MASS INDEX: 36.98 KG/M2 | HEIGHT: 72 IN | WEIGHT: 273 LBS

## 2024-06-19 DIAGNOSIS — G56.21 CUBITAL TUNNEL SYNDROME ON RIGHT: Primary | ICD-10-CM

## 2024-06-19 PROCEDURE — 3008F BODY MASS INDEX DOCD: CPT | Performed by: ORTHOPAEDIC SURGERY

## 2024-06-19 PROCEDURE — 99214 OFFICE O/P EST MOD 30 MIN: CPT | Performed by: ORTHOPAEDIC SURGERY

## 2024-06-19 NOTE — PROGRESS NOTES
Clinic Note       Assessment/Plan:  48 year old male    Right elbow medial epicondyle debridement with common flexor tendon repair and ulnar nerve transposition 3/15/2023-persistent intermittent ulnar nerve symptoms are present.  Based on the nerve conduction velocity there is slowing in the area where clinically I suspect the nerve to be scarred down. Evident on MRI described as mild edema and fascicular thickening.  I do think the patient would benefit from the surgery that was planned earlier which includes a ulnar nerve neurolysis and nerve wrapping.  Patient is scheduled for surgery on July 30, 2024.  Work status: 25 pound work restrictions noted    Follow Up: 6-8 weeks    Diagnostic Studies:  MRI right elbow 3 views: Patient has moderate tendinosis of the common flexor tendon at the medial epicondyle with a small interstitial tear.  There is also a full-thickness tear of the common extensor tendon at its origin  EMG/NCV:    1.  Abnormal study.  2.  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 This is characterized by demyelination of motor and sensory fibers along with sensory axon loss.  3.  Electrodiagnostic evidence is suggestive of but not diagnostic for right ulnar neuropathy at the elbow.  The only abnormality noted is decreased conduction velocity distally which in isolation is nondiagnostic.  Conduction slowing could be equally impacted by focal demyelination near the cubital tunnel or by measurement error due to surgical repositioning of the nerve.  4.  No electrodiagnostic evidence of right cervical radiculopathy.     Physical Exam:    Ht 6' (1.829 m)   Wt 273 lb (123.8 kg)   BMI 37.03 kg/m²     Constitutional: NAD. AOx3. Well-developed and Well-nourished.   Psychiatric: Normal mood/ affect/ behavior. Judgment and thought content normal.     Right upper Extremity:   Inspection:  Incision healed   Palpation: Mild to moderate tenderness over the medial  epicondyle and incision is noted   Motion: Elbow: Full elbow extension flexion. Full elbow motion, full pronation and supination.   Wrist: normal bilateral symmetric ext/flex/sup/pro  Finger: full composite fist     Median Nerve Exam Right   Phdurkan neg   Sensation normal   PCBr Sensation normal   APB Weakness No   Thenar Atrophy No     Ulnar Nerve Exam Right   Tinels Over the ulnar nerve just anterior to the medial epicondyle   EF -   Hypermobility @ elbow neg   Sensation normal  Tingling in the small finger   1st JIM Weakness No   Hypothenar Atrophy No     Radial Nerve Exam  Right Left   Radial Sensory normal normal     CC: Numbness and tingling    HPI: This 48 year old right-hand-dominant male presents with medial sided elbow pain with numbness and tingling in the ring finger and small finger.  It started on June 29.  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  Since then he has developed constant numbness and constant tingling in the ring finger and small finger.  He does have intermittent numbness in the dorsal ulnar sensory nerve branch.  He has shooting pain down the forearm into his fingers.  He has tried a corticosteroid injection, brace, and therapy without significant improvement in symptoms.  His pain is mild to moderate.    Interval Hx (7/10/23): No numbness.  Resolution of tingling in the dorsal ulnar sensory nerve branch distribution.  Slight tingling in the small finger today.  He is very slowly making progress from a strength standpoint.  He does get ulnar nerve irritation with certain therapy related activities.    Interval Hx (8/21/23): Patient reports intermittent tingling in entire finger. He is making progress in therapy but is not back to normal yet. He reports pain everyday.     Interval Hx (6/19/24): Patient is in office for a follow-up visit for right elbow pain. He mentions the discomfort has been relatively consistent and is in  office for further evaluation.     Occupation: Maintenance    Past Medical History:    ADD (attention deficit disorder)    Anesthesia complication    severe headaches every time he gets anesthesia    Essential hypertension    Focal hemorrhagic contusion of cerebrum (HCC)    Intraparenchymal hemorrhage of brain (HCC)    Migraines    Obstructive sleep apnea (adult) (pediatric)    AHI 8 REM AHI 36 SaO2 tiarra 73 % CPAP 10 Premier     Osteoarthrosis, unspecified whether generalized or localized, unspecified site    Sleep apnea    CPAP    Spondylosis of lumbar region without myelopathy or radiculopathy     Past Surgical History:   Procedure Laterality Date    Ankle fracture surgery      Colonoscopy N/A 5/23/2024    Procedure: COLONOSCOPY;  Surgeon: Toya Linn MD;  Location:  ENDOSCOPY    Drain/inject large joint/bursa Bilateral 10/26/2015    Procedure: BURSA INJECTION;  Surgeon: Taco Rea MD;  Location: [facility]    Fluoroscopic guidance needle placement Bilateral 10/26/2015    Procedure: BURSA INJECTION;  Surgeon: Taco Rea MD;  Location: [facility]    Other Left 09/2017    work injury , left wrist     Other surgical history      left wrist     Other surgical history Right 03/15/2023    elbow repair     Current Outpatient Medications   Medication Sig Dispense Refill    Meloxicam 15 MG Oral Tab Take 1 tablet (15 mg total) by mouth daily. 30 tablet 0    Tadalafil 10 MG Oral Tab Take 1 tablet (10 mg total) by mouth daily as needed for Erectile Dysfunction. 20 tablet 3    PEG 3350-KCl-Na Bicarb-NaCl (TRILYTE) 420 g Oral Recon Soln Starting at 4:00 pm the night before procedure, drink 8 ounces of the prep every 15-20 minutes until finished 1 each 0     No Known Allergies  Family History   Problem Relation Age of Onset    Personality Disorder Mother     Depression Mother     Anxiety Mother      Social History     Occupational History    Not on file   Tobacco Use     Smoking status: Never    Smokeless tobacco: Never   Vaping Use    Vaping status: Never Used   Substance and Sexual Activity    Alcohol use: Yes     Comment: Rarely    Drug use: No    Sexual activity: Not on file      Review of Systems (negative unless bolded):  General: fevers, chills, fatigue  CV:  chest pain, palpitations, leg swelling  Msk: bodyaches, neck pain, neck stiffness  Skin: rashes, open wounds, nonhealing ulcers  Hem: bleeds easily, bruise easily, immunocompromised  Neuro: dizziness, light headedness, headaches  Psych: anxious, depressed, anger issues    Attention: This note has been scribed by Belinda Bernal under the supervision of Jad Little MD.      Jad Little MD   Hand, Wrist, & Elbow Surgery  [email or web address]  t: [phone]  f: [phone]

## 2024-06-24 ENCOUNTER — OFFICE VISIT (OUTPATIENT)
Dept: ORTHOPEDICS CLINIC | Facility: CLINIC | Age: 49
End: 2024-06-24

## 2024-06-24 DIAGNOSIS — M75.22 BICEPS TENDINITIS OF LEFT UPPER EXTREMITY: ICD-10-CM

## 2024-06-24 DIAGNOSIS — S43.432A SUPERIOR GLENOID LABRUM LESION OF LEFT SHOULDER, INITIAL ENCOUNTER: Primary | ICD-10-CM

## 2024-06-24 DIAGNOSIS — M75.42 SUBACROMIAL IMPINGEMENT OF LEFT SHOULDER: ICD-10-CM

## 2024-06-24 DIAGNOSIS — M77.12 LATERAL EPICONDYLITIS OF LEFT ELBOW: ICD-10-CM

## 2024-06-24 PROCEDURE — 99417 PROLNG OP E/M EACH 15 MIN: CPT | Performed by: ORTHOPAEDIC SURGERY

## 2024-06-24 PROCEDURE — 20605 DRAIN/INJ JOINT/BURSA W/O US: CPT | Performed by: ORTHOPAEDIC SURGERY

## 2024-06-24 PROCEDURE — 99215 OFFICE O/P EST HI 40 MIN: CPT | Performed by: ORTHOPAEDIC SURGERY

## 2024-06-24 RX ORDER — KETOROLAC TROMETHAMINE 30 MG/ML
30 INJECTION, SOLUTION INTRAMUSCULAR; INTRAVENOUS ONCE
Status: COMPLETED | OUTPATIENT
Start: 2024-06-24 | End: 2024-06-24

## 2024-06-24 RX ORDER — TRIAMCINOLONE ACETONIDE 40 MG/ML
40 INJECTION, SUSPENSION INTRA-ARTICULAR; INTRAMUSCULAR ONCE
Status: COMPLETED | OUTPATIENT
Start: 2024-06-24 | End: 2024-06-24

## 2024-06-24 RX ADMIN — KETOROLAC TROMETHAMINE 30 MG: 30 INJECTION, SOLUTION INTRAMUSCULAR; INTRAVENOUS at 08:20:00

## 2024-06-24 RX ADMIN — TRIAMCINOLONE ACETONIDE 40 MG: 40 INJECTION, SUSPENSION INTRA-ARTICULAR; INTRAMUSCULAR at 08:20:00

## 2024-06-24 NOTE — PROGRESS NOTES
Orthopaedic Surgery  [address] [place] [id number]  [phone]       Name: Bryant Mariscal   MRN: [id number]  Date: 6/24/2024     REASON FOR VISIT: Follow up of left shoulder SLAP pathology and lateral epicondylitis    INTERVAL HISTORY:  Bryant Mariscal is a 48 year old right-hand dominant male who presents today for repeat evaluation of left shoulder SLAP tear and left lateral epicondylitis.     To summarize: left shoulder and elbow injury sustained April 11, 2024. 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 In an attempt to pull on the branch, he heard a pop and felt pain immediately after.      We have been managing conservatively with a steroid injection in the shoulder on 5/13/24.     Today, he reports his shoulder has improved. However, his elbow symptoms have worsened. Pain is 5/10.     He is well known to our team for treatment of right elbow ulnar neuropathy.     PE:   There were no vitals filed for this visit.  Estimated body mass index is 37.03 kg/m² as calculated from the following:    Height as of 6/19/24: 6' (1.829 m).    Weight as of 6/19/24: 273 lb.    Physical Exam  Constitutional:       Appearance: Normal appearance.   HENT:      Head: Normocephalic and atraumatic.   Eyes:      Extraocular Movements: Extraocular movements intact.   Neck:      Musculoskeletal: Normal range of motion and neck supple.   Cardiovascular:      Pulses: Normal pulses.   Pulmonary:      Effort: Pulmonary effort is normal. No respiratory distress.   Abdominal:      General: There is no distension.   Skin:     General: Skin is warm.      Capillary Refill: Capillary refill takes less than 2 seconds.      Findings: No bruising.   Neurological:      General: No focal deficit present.      Mental Status: She is alert.   Psychiatric:         Mood and Affect: Mood normal.     Examination of the left shoulder demonstrates:     Skin is intact, warm and dry.   Cervical:  Full  ROM  Spurling's                           Negative     Deformity:                         none  Atrophy:                             none    Scapular winging:Negative     Palpation:                            AC Joint                                             Negative  Biceps Tendon                  Positive  Greater Tuberosity          Negative     ROM:   Forward Flexion:              150°  Abduction:                         full and symmetric  External Rotation:           full and symmetric  Internal Rotation:            full and symmetric     Rotator Cuff Strength:   Supraspinatus:                  5/5  Subscapularis:                   5/5  Infraspinatus/Teres:        5/5     Provocative Tests:   Lee:                            Negative  Speed's:                             Positive  Honeydew's:                           Positive  Lift-off:                                Negative  Apprehension:                  Negative  Sulcus Sign:                        Negative     Neurovascular Upper Extremity (Bilateral)  Motor:                                5/5 EPL, Finger Abduction, , Pinch, Deltoid  Sensation:                          intact to light touch median, ulnar, radial and axillary nerve  Circulation:                        Normal, 2+ radial pulse     The contralateral upper extremity is without limitation in range of motion or strength, no positive provocative maneuvers.     Radiographic Examination/Diagnostics:    Shoulder XR and MRI personally viewed, independently interpreted and radiology report was reviewed.     XR SHOULDER, COMPLETE (MIN 2 VIEWS), LEFT (CPT=73030)     Result Date: 4/11/2024  PROCEDURE:  XR SHOULDER, COMPLETE (MIN 2 VIEWS), LEFT (CPT=73030) TECHNIQUE:  Multiple views were obtained. COMPARISON:  None. INDICATIONS:  S46.912A Elbow strain, left, initial encounter PATIENT STATED HISTORY: (As transcribed by Technologist)  Patient states at work today he was raking [employer] and one was  still stuck in the ground so pulled really hard and now has left shoulder joint pain along with posterior lateral pain to the elbow. Limited range of movement.      CONCLUSION:    Negative for fracture or malalignment.  The glenohumeral joint space is preserved.  Mild acromioclavicular joint arthrosis with preservation of the subacromial interval and coracoclavicular distance.  The visualized bony thorax and regional soft tissues  are within normal limits.   LOCATION:  [facility] Dictated by (CST): Ez Foster MD on 4/11/2024 at 12:30 PM Finalized by (CST): Ez Foster MD on 4/11/2024 at 12:31 PM      MRI of the left shoulder, obtained at outside facility, independently reviewed:   Demonstrates linear SLAP tear of the left shoulder in the setting of mild subacromial bursitis / impingement. Intact rotator cuff.       IMPRESSION: Bryant Mariscal is a 48 year old with left superior glenoid labrum lesion/SLAP tear and lateral epicondylitis sustained April 11, 2024 while raking branched at work. He underwent a corticosteroid injection on 5/13/24.     PLAN:   ***    ***    FOLLOW-UP:   {Disposition clinic:8101}      Ede Sarmiento MD  Knee, Shoulder, & Elbow Surgery / Sports Medicine Specialist  Orthopaedic Surgery  [address] [place] [email or web address]  t: [phone]  o: [phone]  f: [phone]    This note was dictated using Dragon software.  While it was briefly proofread prior to completion, some grammatical, spelling, and word choice errors due to dictation may still occur.

## 2024-06-24 NOTE — PROCEDURES
Left Elbow Joint Injection    Name: Bryant Mariscal   MRN: [id number]  Date: 6/24/2024     Clinical Indications:   Lateral Epicondylitis    After informed consent, the injection site was marked, sterilized with topical chlorhexidine antiseptic, and locally anesthetized with skin refrigerant.    The patient was placed in a comfortable position and the elbow was exposed. Using sterile technique: 1 mL of 30mg/mL of Ketorolac, 1 mL of 0.5% Bupivicaine, 1 mL of 1% Lidocaine, and 1 mL of 40mg/mL Triamcinolone was injected with direct approach utilizing a 25 gauge needle.  A band-aid was applied.  The patient tolerated the procedure well.    Disposition:   Continue with physical/occupational therapy and follow up in clinic if symptoms do not resolve in 8 weeks.         Ede Sarmiento MD  Knee, Shoulder, & Elbow Surgery / Sports Medicine Specialist  Orthopaedic Surgery  [address] [place] [address] [id number] [place] [email or web address]  t: [phone]  o: [phone]  f: [phone]

## 2024-06-24 NOTE — H&P
[facility] - ORTHOPEDICS  [address] [place] [id number]  [phone]     PRE SURGICAL - HISTORY AND PHYSICAL EXAMINATION     Name: Bryant Mariscal   MRN: [id number]  Date: 6/24/2024     CC: Left shoulder pain and weakness in the setting of SLAP/biceps tendon pathology.     HPI:   Bryant Mariscal is a very pleasant 48 year old right-hand dominant male who presents today for evaluation of MRI scan of the shoulder and discussion regarding definitive management plan.     To summarize: left shoulder and elbow injury sustained April 11, 2024. 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 In an attempt to pull on the branch, he heard a pop and felt pain immediately after.      We have been managing conservatively with a steroid injection in the shoulder on 5/13/24 coupled with physical therapy at [facility] in [place].     Today, he reports his left elbow symptoms have worsened and shoulder symptoms have remained unchanged. Pain is 5/10. He has difficulty driving.      PMH:   Past Medical History:    ADD (attention deficit disorder)    Anesthesia complication    severe headaches every time he gets anesthesia    Essential hypertension    Focal hemorrhagic contusion of cerebrum (HCC)    Intraparenchymal hemorrhage of brain (HCC)    Migraines    Obstructive sleep apnea (adult) (pediatric)    AHI 8 REM AHI 36 SaO2 tiarra 73 % CPAP 10 Premier     Osteoarthrosis, unspecified whether generalized or localized, unspecified site    Sleep apnea    CPAP    Spondylosis of lumbar region without myelopathy or radiculopathy       PAST SURGICAL HX:  Past Surgical History:   Procedure Laterality Date    Ankle fracture surgery      Colonoscopy N/A 5/23/2024    Procedure: COLONOSCOPY;  Surgeon: Toya Linn MD;  Location:  ENDOSCOPY    Drain/inject large joint/bursa Bilateral 10/26/2015    Procedure: BURSA INJECTION;  Surgeon: Taco Rea MD;  Location: [facility]  FOR PAIN MANAGEMENT    Fluoroscopic guidance needle placement Bilateral 10/26/2015    Procedure: BURSA INJECTION;  Surgeon: Taco Rea MD;  Location: [facility]    Other Left 09/2017    work injury , left wrist     Other surgical history      left wrist     Other surgical history Right 03/15/2023    elbow repair       FAMILY HX:  Family History   Problem Relation Age of Onset    Personality Disorder Mother     Depression Mother     Anxiety Mother        ALLERGIES:  Patient has no known allergies.    MEDICATIONS:   Current Outpatient Medications   Medication Sig Dispense Refill    Meloxicam 15 MG Oral Tab Take 1 tablet (15 mg total) by mouth daily. 30 tablet 0    Tadalafil 10 MG Oral Tab Take 1 tablet (10 mg total) by mouth daily as needed for Erectile Dysfunction. 20 tablet 3       ROS: A comprehensive 14 point review of systems was performed and was negative aside from the aforementioned per history of present illness.    SOCIAL HX:  Social History     Tobacco Use    Smoking status: Never    Smokeless tobacco: Never   Substance Use Topics    Alcohol use: Yes     Comment: Rarely       PE:   There were no vitals filed for this visit.  Estimated body mass index is 37.03 kg/m² as calculated from the following:    Height as of 6/19/24: 6' (1.829 m).    Weight as of 6/19/24: 273 lb.    Physical Exam  Constitutional:       Appearance: Normal appearance.   HENT:      Head: Normocephalic and atraumatic.   Eyes:      Extraocular Movements: Extraocular movements intact.   Neck:      Musculoskeletal: Normal range of motion and neck supple.   Cardiovascular:      Pulses: Normal pulses.   Pulmonary:      Effort: Pulmonary effort is normal. No respiratory distress.   Abdominal:      General: There is no distension.   Skin:     General: Skin is warm.      Capillary Refill: Capillary refill takes less than 2 seconds.      Findings: No bruising.   Neurological:      General: No focal deficit present.       Mental Status: Alert.   Psychiatric:         Mood and Affect: Mood normal.     Examination of the left shoulder demonstrates:     Skin is intact, warm and dry.   Cervical:  Full ROM  Spurling's                           Negative     Deformity:                         none  Atrophy:                             none    Scapular winging:Negative     Palpation:                            AC Joint                              Negative  Biceps Tendon                  Positive  Greater Tuberosity          Negative     ROM:   Forward Flexion:              full and symmetric  Abduction:                         full and symmetric  External Rotation:           full and symmetric  Internal Rotation:            full and symmetric     Rotator Cuff Strength:   Supraspinatus:                  5/5  Subscapularis:                   5/5  Infraspinatus/Teres:        5/5     Provocative Tests:   Lee:                            Positive  Speed's:                             Negative  Atlanta's:                           Positive  Lift-off:                                Negative  Apprehension:                  Negative  Sulcus Sign:                        Negative     Neurovascular Upper Extremity (Bilateral)  Motor:                                5/5 EPL, Finger Abduction, , Pinch, Deltoid  Sensation:                          intact to light touch median, ulnar, radial and axillary nerve  Circulation:                        Normal, 2+ radial pulse     The contralateral upper extremity is without limitation in range of motion or strength, no positive provocative maneuvers.     Radiographic Examination/Diagnostics:    Shoulder XR and MRI personally viewed, independently interpreted and radiology report was reviewed.     XR SHOULDER, COMPLETE (MIN 2 VIEWS), LEFT (CPT=73030)     Result Date: 4/11/2024  PROCEDURE:  XR SHOULDER, COMPLETE (MIN 2 VIEWS), LEFT (CPT=73030) TECHNIQUE:  Multiple views were obtained. COMPARISON:  None.  INDICATIONS:  S46.912A Elbow strain, left, initial encounter PATIENT STATED HISTORY: (As transcribed by Technologist)  Patient states at work today he was raking branches and one was still stuck in the ground so pulled really hard and now has left shoulder joint pain along with posterior lateral pain to the elbow. Limited range of movement.      CONCLUSION:    Negative for fracture or malalignment.  The glenohumeral joint space is preserved.  Mild acromioclavicular joint arthrosis with preservation of the subacromial interval and coracoclavicular distance.  The visualized bony thorax and regional soft tissues  are within normal limits.   LOCATION:  [facility] Dictated by (CST): zE Foster MD on 4/11/2024 at 12:30 PM Finalized by (CST): Ez Foster MD on 4/11/2024 at 12:31 PM      MRI of the left shoulder, obtained at outside facility, independently reviewed:   Demonstrates linear SLAP tear of the left shoulder in the setting of mild subacromial bursitis / impingement. Intact rotator cuff.       IMPRESSION: Bryant Mariscal is a 48 year old male with Left shoulder superior glenoid labrum lesion/SLAP tear, subacromial impingement, and biceps tendinitis sustained April 11, 2024 while raking branches at work.     The patient has failed an appropriate course of nonsurgical conservative management and is a candidate for arthroscopic biceps tenodesis, subacromial decompression, and extensive debridement.    Patient also presents with left lateral epicondylitis which is amenable to a corticosteroid and ketorolac injection.     Notably, planning right elbow surgery.  We will defer left shoulder surgery until this is addressed / completed.     PLAN:   We had a detailed discussion outlining the etiology, anatomy, pathophysiology, and natural history of superior labral and biceps tendon pathology of the shoulder. Imaging was reviewed in detail and correlated to a 3-dimensional model of the shoulder.     I had a lengthy  discussion with Bryant about the diagnosis and options, both surgical and nonsurgical. I have recommended that we proceed with arthroscopic biceps tenodesis and likely subacromial decompression as we agree surgical intervention would likely offer the best opportunity for symptomatic relief and functional recovery. I used diagrams, imaging studies, and a 3-dimensional model to outline his pathology, as well as general surgical principles. We reviewed the risks associated with shoulder arthroscopy.   In particular we discussed risks that include, but are not limited to infection, blood loss, potential transient or permanent injury to nerves or blood vessels, joint stiffness, persistent pain, need for future operation, failure of healing, wound complications, failure of therapeutic intervention, risk of re-injury, fixation failure, deep vein thrombosis and pulmonary embolism. We discussed the proposed rehabilitation timeline as well as expected postoperative restrictions.     Most post-surgical patients after arthroscopic biceps tenodesis utilize a shoulder immobilizer/sling for approximately ~2 weeks.  Physical therapy is initiated immediately postsurgically with initial passive range of motion, followed by active assisted range of motion, and ultimately active range of motion after the 4 to 6-week boy.  After the 3-month boy postsurgically the restrictions are lifted and continued strengthening is recommended.    Bryant voiced a good understanding of treatment options, risks and benefits, postoperative instructions, rehabilitation timeline, and restrictions. He was given the opportunity to ask questions, which were all answered to the best of my ability and to his satisfaction. Bryant will work with my office to arrange a time for surgery and obtain any medical clearance information necessary. My pre-operative information packet, which details the process and answers many FAQ's will be provided. He was encouraged  to call the office with any further questions or concerns.  Regarding the left elbow, we reviewed the treatment of this disease condition.  Fortunately, treatment is amenable to conservative treatment which we chose to optimize at today's visit.  After a discussion of a variety of conservative treatment options, I recommended intra-articular injection with corticosteroid and ketorolac.       We elected to proceed with the injection procedure at today's visit. We discussed the risk and benefits of the procedure; including, but not limited to: infection, injury to blood vessels, nerve injury, prolonged pain, swelling, site soreness, failure to progress, and need for advanced treatments.       I spent 60 minutes in preparation to see the patient, counseling/education of relevant pathology, discussing imaging results, surgical counseling, DME fitting, and care coordination.      FOLLOW-UP:   Post-Operative Visit, POD 6 with Idar SRUTHI Mcbride MD  Knee, Shoulder, & Elbow Surgery / Sports Medicine Specialist  Orthopaedic Surgery  [address] [place] [address].[email or web address]  t: [phone]  o: [phone]  f: [phone]    This note was dictated using Dragon software.  While it was briefly proofread prior to completion, some grammatical, spelling, and word choice errors due to dictation may still occur.

## 2024-06-24 NOTE — PROGRESS NOTES
OR BOOKING SHEET SHOULDER  Location: [x] [facility]   [x] [facility]  Name: Bryant Mariscal  MRN: [id number]   : 1975  Diagnos  [x] Superior glenoid labrum lesion of left shoulder, initial encounter [S43.930V]  Disposition:    [x] Ambulatory  [] Overnight for RADHA  [] Overnight for observation and pain control  [] Inpatient procedure    Operative Time Required:  1.5 hours ([facility])  Antibiotics: 2 g cefazolin within 60 minutes of surgical incision  Procedure:   Laterality: [] RIGHT [x] LEFT                  [] BILATERAL  Procedures:   [x] Shoulder Arthroscopy    [] Rotator Cuff Repair (98909)  [x] Arthroscopic Biceps Tenodesis (00364)  [x] Subacromial Decompression (12985)  [x] Distal Clavicle Excision (05143)  [] Extensive Debridement (40593)    [x] SLAP repair (43390)  [] Capsulorraphy / Labrum Repair (61152)    Additional info:   [x] PCP Clearance Needed  [] MRSA  [] C-Arm  [x] TXA at time surgery  [x] Physical Therapy [facility]  [x] DME Rx Needed  [] Appt with Dr. Sarmiento needed  Implants needed: Arthrex  Positioning Equipment: Beach Chair Setup, Tiarra

## 2024-07-03 ENCOUNTER — MED REC SCAN ONLY (OUTPATIENT)
Dept: ORTHOPEDICS CLINIC | Facility: CLINIC | Age: 49
End: 2024-07-03

## 2024-07-24 ENCOUNTER — TELEPHONE (OUTPATIENT)
Dept: ORTHOPEDICS CLINIC | Facility: CLINIC | Age: 49
End: 2024-07-24

## 2024-07-24 DIAGNOSIS — G56.21 ULNAR NEUROPATHY AT ELBOW OF RIGHT UPPER EXTREMITY: Primary | ICD-10-CM

## 2024-07-24 NOTE — TELEPHONE ENCOUNTER
Date of Surgery: 24       Post Op Appt:      Case ID: [id number]    Notes:       SURGICAL BOOKING SHEET   Name: Bryant Mariscal  MRN: [id number]   : 1975     Surgical Date:    24   Surgical Consent:    Neurolysis of right ulnar nerve at elbow with nerve wrapping   Diagnosis:         ICD-10-CM   1. Ulnar neuropathy at elbow of right upper extremity  G56.21       Procedure Codes:    Cubital tunnel release (CPT 91602  Nerve wrap (CPT 03486)   Disposition:    Outpatient   Operative Time:    1 hr   Antibiotics:    Ancef 2g   Anesthesia Type:     Monitored Anesthesia Care (MAC) and Regional - Supraclavicular   Clearance:     NONE   Equipment:    Sterile Tourniquet, Axogen Nerve Wrap, Checkpoint nerve stimulator (standby)1% lidocaine with epinephrine, Vessel loops, Suture: 0 Vicryl UR-6,  3-0 Monocryl, Skin glue & Steri-Strips , and Sling   Assistant:    Sidney Assistant: Yes, Giovanna Lane PA-C   Follow Up:     or 10-14 days post op with Giovanna   Pain Medication:    Norco 325-5mg   Therapy:    None

## 2024-07-24 NOTE — TELEPHONE ENCOUNTER
S/W PT WENT OVER SURGICAL PROTOCOL AND SCHEDULED POST OP APPT. PT HAD NO FURTHER QUESTIONS OR CONCERNS

## 2024-07-26 ENCOUNTER — TELEPHONE (OUTPATIENT)
Dept: FAMILY MEDICINE CLINIC | Facility: CLINIC | Age: 49
End: 2024-07-26

## 2024-07-26 ENCOUNTER — MED REC SCAN ONLY (OUTPATIENT)
Dept: FAMILY MEDICINE CLINIC | Facility: CLINIC | Age: 49
End: 2024-07-26

## 2024-07-31 ENCOUNTER — MED REC SCAN ONLY (OUTPATIENT)
Dept: ORTHOPEDICS CLINIC | Facility: CLINIC | Age: 49
End: 2024-07-31

## 2024-08-12 ENCOUNTER — OFFICE VISIT (OUTPATIENT)
Dept: ORTHOPEDICS CLINIC | Facility: CLINIC | Age: 49
End: 2024-08-12
Payer: OTHER MISCELLANEOUS

## 2024-08-12 VITALS — HEIGHT: 72 IN | WEIGHT: 273 LBS | BODY MASS INDEX: 36.98 KG/M2

## 2024-08-12 DIAGNOSIS — G56.21 ULNAR NEUROPATHY AT ELBOW OF RIGHT UPPER EXTREMITY: Primary | ICD-10-CM

## 2024-08-12 NOTE — PROGRESS NOTES
Clinic Note       Assessment/Plan:  48 year old male    Right elbow medial epicondyle debridement with common flexor tendon repair and ulnar nerve transposition 3/15/2023 s/p neurolysis of ulnar nerve with nerve wrapping on 7/30/24- post operatively has numbness over the whole hand likely secondary to nerve block. Expect the numbness to slow improve. It appears only sensory with minimal motor involvement at this time. Consider starting therapy at next visit.  Work status: off work    Follow Up: 2-3 weeks    Diagnostic Studies:  MRI right elbow 3 views: Patient has moderate tendinosis of the common flexor tendon at the medial epicondyle with a small interstitial tear.  There is also a full-thickness tear of the common extensor tendon at its origin  EMG/NCV:    1.  Abnormal study.  2.  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 This is characterized by demyelination of motor and sensory fibers along with sensory axon loss.  3.  Electrodiagnostic evidence is suggestive of but not diagnostic for right ulnar neuropathy at the elbow.  The only abnormality noted is decreased conduction velocity distally which in isolation is nondiagnostic.  Conduction slowing could be equally impacted by focal demyelination near the cubital tunnel or by measurement error due to surgical repositioning of the nerve.  4.  No electrodiagnostic evidence of right cervical radiculopathy.     Physical Exam:    Ht 6' (1.829 m)   Wt 273 lb (123.8 kg)   BMI 37.03 kg/m²     Constitutional: NAD. AOx3. Well-developed and Well-nourished.   Psychiatric: Normal mood/ affect/ behavior. Judgment and thought content normal.     Right upper Extremity:   Inspection:  Incision healed   Palpation: Mild to moderate tenderness over the medial epicondyle and incision is noted   Motion: Elbow: Full elbow extension flexion. Full elbow motion, full pronation and supination.   Wrist: normal bilateral symmetric  ext/flex/sup/pro  Finger: full composite fist     Median Nerve Exam Right   Phdurkan neg   Sensation Decreased sensation in  Thumb 10%, IF15, MF50, RF75, SF 90    DRS- 10    PCBr Sensation normal   APB Weakness No   Thenar Atrophy No     Ulnar Nerve Exam Right   Tinels No tinels    EF -   Hypermobility @ elbow neg   Sensation Abnormal see above   1st JIM Weakness No   Hypothenar Atrophy No     Radial Nerve Exam  Right Left   Radial Sensory normal normal     CC: Numbness and tingling    HPI: This 48 year old right-hand-dominant male presents with medial sided elbow pain with numbness and tingling in the ring finger and small finger.  It started on June 29.  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  Since then he has developed constant numbness and constant tingling in the ring finger and small finger.  He does have intermittent numbness in the dorsal ulnar sensory nerve branch.  He has shooting pain down the forearm into his fingers.  He has tried a corticosteroid injection, brace, and therapy without significant improvement in symptoms.  His pain is mild to moderate.    Interval Hx (7/10/23): No numbness.  Resolution of tingling in the dorsal ulnar sensory nerve branch distribution.  Slight tingling in the small finger today.  He is very slowly making progress from a strength standpoint.  He does get ulnar nerve irritation with certain therapy related activities.    Interval Hx (8/21/23): Patient reports intermittent tingling in entire finger. He is making progress in therapy but is not back to normal yet. He reports pain everyday.     Interval Hx (6/19/24): Patient is in office for a follow-up visit for right elbow pain. He mentions the discomfort has been relatively consistent and is in office for further evaluation.     Interval Hx (8/12/2024): Patient reports numbness in the whole hand with it being most dense in SF/RF. It feels like nerve block has not  worn off fully.    Occupation: Maintenance    Past Medical History:    ADD (attention deficit disorder)    Essential hypertension    Focal hemorrhagic contusion of cerebrum (HCC)    Intraparenchymal hemorrhage of brain (HCC)    Migraines    Obstructive sleep apnea (adult) (pediatric)    AHI 8 REM AHI 36 SaO2 tiarra 73 % CPAP 10 Premier     Osteoarthrosis, unspecified whether generalized or localized, unspecified site    Sleep apnea    CPAP    Spondylosis of lumbar region without myelopathy or radiculopathy     Past Surgical History:   Procedure Laterality Date    Ankle fracture surgery      Colonoscopy N/A 5/23/2024    Procedure: COLONOSCOPY;  Surgeon: Toya Linn MD;  Location:  ENDOSCOPY    Drain/inject large joint/bursa Bilateral 10/26/2015    Procedure: BURSA INJECTION;  Surgeon: Taco Rea MD;  Location: [facility] PAIN MANAGEMENT    Fluoroscopic guidance needle placement Bilateral 10/26/2015    Procedure: BURSA INJECTION;  Surgeon: Taco Rea MD;  Location: [facility] PAIN [facility]NAG[facility]    Other Left 09/2017    work injury , left wrist     Other surgical history      left wrist     Other surgical history Right 03/15/2023    elbow repair     Current Outpatient Medications   Medication Sig Dispense Refill    HYDROcodone-acetaminophen (NORCO) 5-325 MG Oral Tab Take 1 tablet by mouth every 6 (six) hours as needed for Pain. 20 tablet 0    Meloxicam 15 MG Oral Tab Take 1 tablet (15 mg total) by mouth daily. 30 tablet 0    Tadalafil 10 MG Oral Tab Take 1 tablet (10 mg total) by mouth daily as needed for Erectile Dysfunction. 20 tablet 3     No Known Allergies  Family History   Problem Relation Age of Onset    Personality Disorder Mother     Depression Mother     Anxiety Mother      Social History     Occupational History    Not on file   Tobacco Use    Smoking status: Never    Smokeless tobacco: Never   Vaping Use    Vaping status: Never Used   Substance and Sexual Activity    Alcohol use: Yes      Comment: Rarely    Drug use: No    Sexual activity: Not on file      Review of Systems (negative unless bolded):  General: fevers, chills, fatigue  CV:  chest pain, palpitations, leg swelling  Msk: bodyaches, neck pain, neck stiffness  Skin: rashes, open wounds, nonhealing ulcers  Hem: bleeds easily, bruise easily, immunocompromised  Neuro: dizziness, light headedness, headaches  Psych: anxious, depressed, anger issues    Jad Little MD   Hand, Wrist, & Elbow Surgery  [email or web address]  t: [phone]  f: [phone]

## 2024-08-22 ENCOUNTER — TELEPHONE (OUTPATIENT)
Dept: ORTHOPEDICS CLINIC | Facility: CLINIC | Age: 49
End: 2024-08-22

## 2024-08-22 NOTE — TELEPHONE ENCOUNTER
Received in the mail medical records subpoena from Loni Griffiths on behalf of patient. Sent to [employer].

## 2024-08-26 ENCOUNTER — OFFICE VISIT (OUTPATIENT)
Dept: ORTHOPEDICS CLINIC | Facility: CLINIC | Age: 49
End: 2024-08-26
Payer: OTHER MISCELLANEOUS

## 2024-08-26 VITALS — WEIGHT: 273 LBS | HEIGHT: 72 IN | BODY MASS INDEX: 36.98 KG/M2

## 2024-08-26 DIAGNOSIS — G56.21 ULNAR NEUROPATHY AT ELBOW OF RIGHT UPPER EXTREMITY: Primary | ICD-10-CM

## 2024-08-26 NOTE — PROGRESS NOTES
Clinic Note       Assessment/Plan:  48 year old male    Right elbow medial epicondyle debridement with common flexor tendon repair and ulnar nerve transposition 3/15/2023 s/p neurolysis of ulnar nerve with nerve wrapping on 7/30/24- post operatively has numbness over the whole hand likely secondary to nerve block.  Improvement is noted.  Continue to follow for sensory recovery.  Patient can begin therapy  Work status: off work    Follow Up: 5 weeks    Diagnostic Studies:  MRI right elbow 3 views: Patient has moderate tendinosis of the common flexor tendon at the medial epicondyle with a small interstitial tear.  There is also a full-thickness tear of the common extensor tendon at its origin  EMG/NCV:    1.  Abnormal study.  2.  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 This is characterized by demyelination of motor and sensory fibers along with sensory axon loss.  3.  Electrodiagnostic evidence is suggestive of but not diagnostic for right ulnar neuropathy at the elbow.  The only abnormality noted is decreased conduction velocity distally which in isolation is nondiagnostic.  Conduction slowing could be equally impacted by focal demyelination near the cubital tunnel or by measurement error due to surgical repositioning of the nerve.  4.  No electrodiagnostic evidence of right cervical radiculopathy.     Physical Exam:    Ht 6' (1.829 m)   Wt 273 lb (123.8 kg)   BMI 37.03 kg/m²     Constitutional: NAD. AOx3. Well-developed and Well-nourished.   Psychiatric: Normal mood/ affect/ behavior. Judgment and thought content normal.     Right upper Extremity:   Inspection:  Incision healed   Palpation: Mild to moderate tenderness over the medial epicondyle and incision is noted   Motion: Elbow: Full elbow extension flexion. Full elbow motion, full pronation and supination.   Wrist: normal bilateral symmetric ext/flex/sup/pro  Finger: full composite fist     Median Nerve Exam  Right   Phdurkan neg   Sensation Decreased sensation in  Thumb 10%, IF15, MF50, RF50-60, SF 50-60    DRS- 10    PCBr Sensation normal   APB Weakness No   Thenar Atrophy No     Ulnar Nerve Exam Right   Tinels No tinels    EF -   Hypermobility @ elbow neg   Sensation Abnormal see above   1st JIM Weakness No   Hypothenar Atrophy No     Radial Nerve Exam  Right Left   Radial Sensory normal normal     CC: Numbness and tingling    HPI: This 48 year old right-hand-dominant male presents with medial sided elbow pain with numbness and tingling in the ring finger and small finger.  It started on June 29.  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  Since then he has developed constant numbness and constant tingling in the ring finger and small finger.  He does have intermittent numbness in the dorsal ulnar sensory nerve branch.  He has shooting pain down the forearm into his fingers.  He has tried a corticosteroid injection, brace, and therapy without significant improvement in symptoms.  His pain is mild to moderate.    Interval Hx (7/10/23): No numbness.  Resolution of tingling in the dorsal ulnar sensory nerve branch distribution.  Slight tingling in the small finger today.  He is very slowly making progress from a strength standpoint.  He does get ulnar nerve irritation with certain therapy related activities.    Interval Hx (8/21/23): Patient reports intermittent tingling in entire finger. He is making progress in therapy but is not back to normal yet. He reports pain everyday.     Interval Hx (6/19/24): Patient is in office for a follow-up visit for right elbow pain. He mentions the discomfort has been relatively consistent and is in office for further evaluation.     Interval Hx (8/12/2024): Patient reports numbness in the whole hand with it being most dense in SF/RF. It feels like nerve block has not worn off fully.    Interval Hx (8/26/2024): Patient reports  improvement in likely nerve block related numbness and tingling.  With full elbow flexion he does report some tightness that he feels on the inside part of the elbow.      Occupation: Maintenance    Past Medical History:    ADD (attention deficit disorder)    Essential hypertension    Focal hemorrhagic contusion of cerebrum (HCC)    Intraparenchymal hemorrhage of brain (HCC)    Migraines    Obstructive sleep apnea (adult) (pediatric)    AHI 8 REM AHI 36 SaO2 tiarra 73 % CPAP 10 Premier     Osteoarthrosis, unspecified whether generalized or localized, unspecified site    Sleep apnea    CPAP    Spondylosis of lumbar region without myelopathy or radiculopathy     Past Surgical History:   Procedure Laterality Date    Ankle fracture surgery      Colonoscopy N/A 5/23/2024    Procedure: COLONOSCOPY;  Surgeon: Toya Linn MD;  Location:  ENDOSCOPY    Drain/inject large joint/bursa Bilateral 10/26/2015    Procedure: BURSA INJECTION;  Surgeon: Taco Rea MD;  Location: [facility]    Fluoroscopic guidance needle placement Bilateral 10/26/2015    Procedure: BURSA INJECTION;  Surgeon: Taco Rea MD;  Location: [facility]    Other Left 09/2017    work injury , left wrist     Other surgical history      left wrist     Other surgical history Right 03/15/2023    elbow repair     Current Outpatient Medications   Medication Sig Dispense Refill    Meloxicam 15 MG Oral Tab Take 1 tablet (15 mg total) by mouth daily. 30 tablet 0    Tadalafil 10 MG Oral Tab Take 1 tablet (10 mg total) by mouth daily as needed for Erectile Dysfunction. 20 tablet 3    HYDROcodone-acetaminophen (NORCO) 5-325 MG Oral Tab Take 1 tablet by mouth every 6 (six) hours as needed for Pain. (Patient not taking: Reported on 8/26/2024) 20 tablet 0     No Known Allergies  Family History   Problem Relation Age of Onset    Personality Disorder Mother     Depression Mother     Anxiety Mother      Social History      Occupational History    Not on file   Tobacco Use    Smoking status: Never    Smokeless tobacco: Never   Vaping Use    Vaping status: Never Used   Substance and Sexual Activity    Alcohol use: Yes     Comment: Rarely    Drug use: No    Sexual activity: Not on file      Review of Systems (negative unless bolded):  General: fevers, chills, fatigue  CV:  chest pain, palpitations, leg swelling  Msk: bodyaches, neck pain, neck stiffness  Skin: rashes, open wounds, nonhealing ulcers  Hem: bleeds easily, bruise easily, immunocompromised  Neuro: dizziness, light headedness, headaches  Psych: anxious, depressed, anger issues    Yola Little MD   Hand, Wrist, & Elbow Surgery  [email or web address]  t: [phone]  f: [phone]

## 2024-09-05 ENCOUNTER — MED REC SCAN ONLY (OUTPATIENT)
Dept: ORTHOPEDICS CLINIC | Facility: CLINIC | Age: 49
End: 2024-09-05

## 2024-09-24 ENCOUNTER — MED REC SCAN ONLY (OUTPATIENT)
Facility: CLINIC | Age: 49
End: 2024-09-24

## 2024-09-30 ENCOUNTER — OFFICE VISIT (OUTPATIENT)
Dept: ORTHOPEDICS CLINIC | Facility: CLINIC | Age: 49
End: 2024-09-30
Payer: OTHER MISCELLANEOUS

## 2024-09-30 VITALS — WEIGHT: 273 LBS | BODY MASS INDEX: 36.98 KG/M2 | HEIGHT: 72 IN

## 2024-09-30 DIAGNOSIS — G56.21 ULNAR NEUROPATHY AT ELBOW OF RIGHT UPPER EXTREMITY: Primary | ICD-10-CM

## 2024-09-30 PROCEDURE — 99024 POSTOP FOLLOW-UP VISIT: CPT | Performed by: ORTHOPAEDIC SURGERY

## 2024-09-30 NOTE — PROGRESS NOTES
Clinic Note       Assessment/Plan:  49 year old male    Right elbow medial epicondyle debridement with common flexor tendon repair and ulnar nerve transposition 3/15/2023 s/p neurolysis of ulnar nerve with nerve wrapping on 7/30/24-sensory abnormality continues to improve with resolution of numbness in the volar fingertips.  Some residual numbness in the dorsal ulnar sensory nerve branch.  Tingling is present.  Continue therapy.  At next visit hopefully he will be strong enough to transition to work conditioning  Work status: Patient can return to work with 5 pound restrictions.    Follow Up: 4 weeks    Diagnostic Studies:  MRI right elbow 3 views: Patient has moderate tendinosis of the common flexor tendon at the medial epicondyle with a small interstitial tear.  There is also a full-thickness tear of the common extensor tendon at its origin  EMG/NCV:    1.  Abnormal study.  2.  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 This is characterized by demyelination of motor and sensory fibers along with sensory axon loss.  3.  Electrodiagnostic evidence is suggestive of but not diagnostic for right ulnar neuropathy at the elbow.  The only abnormality noted is decreased conduction velocity distally which in isolation is nondiagnostic.  Conduction slowing could be equally impacted by focal demyelination near the cubital tunnel or by measurement error due to surgical repositioning of the nerve.  4.  No electrodiagnostic evidence of right cervical radiculopathy.     Physical Exam:    Ht 6' (1.829 m)   Wt 273 lb (123.8 kg)   BMI 37.03 kg/m²     Constitutional: NAD. AOx3. Well-developed and Well-nourished.   Psychiatric: Normal mood/ affect/ behavior. Judgment and thought content normal.     Right upper Extremity:   Inspection:  Incision healed   Palpation: Mild to moderate tenderness over the medial epicondyle and incision is noted   Motion: Elbow: Full elbow extension flexion.  Full elbow motion, full pronation and supination.   Wrist: normal bilateral symmetric ext/flex/sup/pro  Finger: full composite fist     Median Nerve Exam Right   Phdurkan neg   Sensation Resolution of numbness in Thumb/IF/MF/RF/SF volarly    Tingling in all the fingers    DUSN - numb   PCBr Sensation normal   APB Weakness No   Thenar Atrophy No     Ulnar Nerve Exam Right   Tinels No tinels    EF -   Hypermobility @ elbow neg   Sensation Abnormal see above   1st JIM Weakness No   Hypothenar Atrophy No     Radial Nerve Exam  Right Left   Radial Sensory normal normal     CC: Numbness and tingling    HPI: This 49 year old right-hand-dominant male presents with medial sided elbow pain with numbness and tingling in the ring finger and small finger.  It started on June 29.  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  Since then he has developed constant numbness and constant tingling in the ring finger and small finger.  He does have intermittent numbness in the dorsal ulnar sensory nerve branch.  He has shooting pain down the forearm into his fingers.  He has tried a corticosteroid injection, brace, and therapy without significant improvement in symptoms.  His pain is mild to moderate.    Interval Hx (7/10/23): No numbness.  Resolution of tingling in the dorsal ulnar sensory nerve branch distribution.  Slight tingling in the small finger today.  He is very slowly making progress from a strength standpoint.  He does get ulnar nerve irritation with certain therapy related activities.    Interval Hx (8/21/23): Patient reports intermittent tingling in entire finger. He is making progress in therapy but is not back to normal yet. He reports pain everyday.     Interval Hx (6/19/24): Patient is in office for a follow-up visit for right elbow pain. He mentions the discomfort has been relatively consistent and is in office for further evaluation.     Interval Hx (8/12/2024):  Patient reports numbness in the whole hand with it being most dense in SF/RF. It feels like nerve block has not worn off fully.    Interval Hx (8/26/2024): Patient reports improvement in likely nerve block related numbness and tingling.  With full elbow flexion he does report some tightness that he feels on the inside part of the elbow.    Interval Hx (9/30/2024): Numbness in the volar fingertips is resolved.  Still has some numbness in the dorsal ulnar sensory nerve branch.  Tingling is present in all the fingers.    Occupation: Maintenance    Past Medical History:    ADD (attention deficit disorder)    Essential hypertension    Focal hemorrhagic contusion of cerebrum (HCC)    Intraparenchymal hemorrhage of brain (HCC)    Migraines    Obstructive sleep apnea (adult) (pediatric)    AHI 8 REM AHI 36 SaO2 tiarra 73 % CPAP 10 Premier     Osteoarthrosis, unspecified whether generalized or localized, unspecified site    Sleep apnea    CPAP    Spondylosis of lumbar region without myelopathy or radiculopathy     Past Surgical History:   Procedure Laterality Date    Ankle fracture surgery      Colonoscopy N/A 5/23/2024    Procedure: COLONOSCOPY;  Surgeon: Toya Linn MD;  Location:  ENDOSCOPY    Drain/inject large joint/bursa Bilateral 10/26/2015    Procedure: BURSA INJECTION;  Surgeon: Taco Rea MD;  Location: [facility]    Fluoroscopic guidance needle placement Bilateral 10/26/2015    Procedure: BURSA INJECTION;  Surgeon: Taco Rea MD;  Location: [facility]    Other Left 09/2017    work injury , left wrist     Other surgical history      left wrist     Other surgical history Right 03/15/2023    elbow repair     Current Outpatient Medications   Medication Sig Dispense Refill    Meloxicam 15 MG Oral Tab Take 1 tablet (15 mg total) by mouth daily. 30 tablet 0    Tadalafil 10 MG Oral Tab Take 1 tablet (10 mg total) by mouth daily as needed for Erectile Dysfunction. 20  tablet 3    HYDROcodone-acetaminophen (NORCO) 5-325 MG Oral Tab Take 1 tablet by mouth every 6 (six) hours as needed for Pain. (Patient not taking: Reported on 8/26/2024) 20 tablet 0     No Known Allergies  Family History   Problem Relation Age of Onset    Personality Disorder Mother     Depression Mother     Anxiety Mother      Social History     Occupational History    Not on file   Tobacco Use    Smoking status: Never    Smokeless tobacco: Never   Vaping Use    Vaping status: Never Used   Substance and Sexual Activity    Alcohol use: Yes     Comment: Rarely    Drug use: No    Sexual activity: Not on file      Review of Systems (negative unless bolded):  General: fevers, chills, fatigue  CV:  chest pain, palpitations, leg swelling  Msk: bodyaches, neck pain, neck stiffness  Skin: rashes, open wounds, nonhealing ulcers  Hem: bleeds easily, bruise easily, immunocompromised  Neuro: dizziness, light headedness, headaches  Psych: anxious, depressed, anger issues    Jad Little MD   Hand, Wrist, & Elbow Surgery  [email or web address]  t: [phone]  f: [phone]

## 2024-10-03 ENCOUNTER — MED REC SCAN ONLY (OUTPATIENT)
Dept: ORTHOPEDICS CLINIC | Facility: CLINIC | Age: 49
End: 2024-10-03

## 2024-10-25 ENCOUNTER — OFFICE VISIT (OUTPATIENT)
Dept: ORTHOPEDICS CLINIC | Facility: CLINIC | Age: 49
End: 2024-10-25
Payer: OTHER MISCELLANEOUS

## 2024-10-25 VITALS — HEIGHT: 72 IN | WEIGHT: 273 LBS | BODY MASS INDEX: 36.98 KG/M2

## 2024-10-25 DIAGNOSIS — G56.21 ULNAR NEUROPATHY AT ELBOW OF RIGHT UPPER EXTREMITY: Primary | ICD-10-CM

## 2024-10-25 PROCEDURE — 99024 POSTOP FOLLOW-UP VISIT: CPT | Performed by: ORTHOPAEDIC SURGERY

## 2024-10-25 NOTE — PROGRESS NOTES
Clinic Note       Assessment/Plan:  49 year old male    Right elbow medial epicondyle debridement with common flexor tendon repair and ulnar nerve transposition 3/15/2023 s/p neurolysis of ulnar nerve with nerve wrapping on 7/30/24- Minimal progress with strength with therapy. Some sensory improvement. Continue therapy. If significant gains not seen in terms of strength by next visit, proceed with FCE. If signficant gains notes work conditioning will be recommended  Right carpal tunnel syndrome - EMG/NCV confirmed. Symptoms improving. Continue to follow.   Work status: Patient can return to work with 10 pound restrictions.    Follow Up: 4 weeks    Diagnostic Studies:  MRI right elbow 3 views: Patient has moderate tendinosis of the common flexor tendon at the medial epicondyle with a small interstitial tear.  There is also a full-thickness tear of the common extensor tendon at its origin  EMG/NCV:    1.  Abnormal study.  2.  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 This is characterized by demyelination of motor and sensory fibers along with sensory axon loss.  3.  Electrodiagnostic evidence is suggestive of but not diagnostic for right ulnar neuropathy at the elbow.  The only abnormality noted is decreased conduction velocity distally which in isolation is nondiagnostic.  Conduction slowing could be equally impacted by focal demyelination near the cubital tunnel or by measurement error due to surgical repositioning of the nerve.  4.  No electrodiagnostic evidence of right cervical radiculopathy.     Physical Exam:    Ht 6' (1.829 m)   Wt 273 lb (123.8 kg)   BMI 37.03 kg/m²     Constitutional: NAD. AOx3. Well-developed and Well-nourished.   Psychiatric: Normal mood/ affect/ behavior. Judgment and thought content normal.     Right upper Extremity:   Inspection:  Incision healed   Palpation: Mild to moderate tenderness over ulnar nerve anterior to medial eipcondyle    Motion: Elbow: Full elbow extension flexion. Full elbow motion, full pronation and supination.   Wrist: normal bilateral symmetric ext/flex/sup/pro  Finger: full composite fist     Median Nerve Exam Right   Phdurkan neg   Sensation Tingling in IF, MF with numbness in RF and SF. SF worse than RF. Overall better than last visit    DUSN - numbness but better than last visit   PCBr Sensation normal   APB Weakness No   Thenar Atrophy No     Ulnar Nerve Exam Right   Tinels + tinels over ulnar nerve proximal to medial epicondyle   EF -   Hypermobility @ elbow neg   Sensation Abnormal see above   1st JIM Weakness No   Hypothenar Atrophy No     Radial Nerve Exam  Right Left   Radial Sensory normal normal     CC: Numbness and tingling    HPI: This 49 year old right-hand-dominant male presents with medial sided elbow pain with numbness and tingling in the ring finger and small finger.  It started on June 29.  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  Since then he has developed constant numbness and constant tingling in the ring finger and small finger.  He does have intermittent numbness in the dorsal ulnar sensory nerve branch.  He has shooting pain down the forearm into his fingers.  He has tried a corticosteroid injection, brace, and therapy without significant improvement in symptoms.  His pain is mild to moderate.    Interval Hx (7/10/23): No numbness.  Resolution of tingling in the dorsal ulnar sensory nerve branch distribution.  Slight tingling in the small finger today.  He is very slowly making progress from a strength standpoint.  He does get ulnar nerve irritation with certain therapy related activities.    Interval Hx (8/21/23): Patient reports intermittent tingling in entire finger. He is making progress in therapy but is not back to normal yet. He reports pain everyday.     Interval Hx (6/19/24): Patient is in office for a follow-up visit for right elbow  pain. He mentions the discomfort has been relatively consistent and is in office for further evaluation.     Interval Hx (8/12/2024): Patient reports numbness in the whole hand with it being most dense in SF/RF. It feels like nerve block has not worn off fully.    Interval Hx (8/26/2024): Patient reports improvement in likely nerve block related numbness and tingling.  With full elbow flexion he does report some tightness that he feels on the inside part of the elbow.    Interval Hx (9/30/2024): Numbness in the volar fingertips is resolved.  Still has some numbness in the dorsal ulnar sensory nerve branch.  Tingling is present in all the fingers.    Interval Hx (10/25/2024): Slowly improving strength. Continued sensitivity over medial aspect of elbow. Numbness in hand improving.    Occupation: Maintenance    Past Medical History:    ADD (attention deficit disorder)    Essential hypertension    Focal hemorrhagic contusion of cerebrum (HCC)    Intraparenchymal hemorrhage of brain (HCC)    Migraines    Obstructive sleep apnea (adult) (pediatric)    AHI 8 REM AHI 36 SaO2 tiarra 73 % CPAP 10 Premier     Osteoarthrosis, unspecified whether generalized or localized, unspecified site    Sleep apnea    CPAP    Spondylosis of lumbar region without myelopathy or radiculopathy     Past Surgical History:   Procedure Laterality Date    Ankle fracture surgery      Colonoscopy N/A 5/23/2024    Procedure: COLONOSCOPY;  Surgeon: Toya Linn MD;  Location:  ENDOSCOPY    Drain/inject large joint/bursa Bilateral 10/26/2015    Procedure: BURSA INJECTION;  Surgeon: Taco Rea MD;  Location: [facility]    Fluoroscopic guidance needle placement Bilateral 10/26/2015    Procedure: BURSA INJECTION;  Surgeon: Taco Rea MD;  Location: [facility]    Other Left 09/2017    work injury , left wrist     Other surgical history      left wrist     Other surgical history Right 03/15/2023    elbow  repair     Current Outpatient Medications   Medication Sig Dispense Refill    Meloxicam 15 MG Oral Tab Take 1 tablet (15 mg total) by mouth daily. 30 tablet 0    Tadalafil 10 MG Oral Tab Take 1 tablet (10 mg total) by mouth daily as needed for Erectile Dysfunction. 20 tablet 3    HYDROcodone-acetaminophen (NORCO) 5-325 MG Oral Tab Take 1 tablet by mouth every 6 (six) hours as needed for Pain. (Patient not taking: Reported on 10/25/2024) 20 tablet 0     No Known Allergies  Family History   Problem Relation Age of Onset    Personality Disorder Mother     Depression Mother     Anxiety Mother      Social History     Occupational History    Not on file   Tobacco Use    Smoking status: Never    Smokeless tobacco: Never   Vaping Use    Vaping status: Never Used   Substance and Sexual Activity    Alcohol use: Yes     Comment: Rarely    Drug use: No    Sexual activity: Not on file      Review of Systems (negative unless bolded):  General: fevers, chills, fatigue  CV:  chest pain, palpitations, leg swelling  Msk: bodyaches, neck pain, neck stiffness  Skin: rashes, open wounds, nonhealing ulcers  Hem: bleeds easily, bruise easily, immunocompromised  Neuro: dizziness, light headedness, headaches  Psych: anxious, depressed, anger issues    Yola Little MD   Hand, Wrist, & Elbow Surgery  [email or web address]  t: [phone]  f: [phone]

## 2024-11-22 ENCOUNTER — OFFICE VISIT (OUTPATIENT)
Dept: ORTHOPEDICS CLINIC | Facility: CLINIC | Age: 49
End: 2024-11-22
Payer: OTHER MISCELLANEOUS

## 2024-11-22 VITALS — WEIGHT: 273 LBS | BODY MASS INDEX: 36.98 KG/M2 | HEIGHT: 72 IN

## 2024-11-22 DIAGNOSIS — G56.21 ULNAR NEUROPATHY AT ELBOW OF RIGHT UPPER EXTREMITY: Primary | ICD-10-CM

## 2024-11-22 NOTE — PROGRESS NOTES
Clinic Note       Assessment/Plan:  49 year old male    Right elbow medial epicondyle debridement with common flexor tendon repair and ulnar nerve transposition 3/15/2023 s/p neurolysis of ulnar nerve with nerve wrapping on 7/30/24-resolution of tingling in the ulnar aspect of the small finger and the dorsal ulnar sensory nerve branch.  Patient continues to have some tingling mostly PIP joint and distal and the radial aspect of the small finger and the ulnar aspect of the ring finger.  Sensitivity over the elbow she will continue to improve until the 1 year boy.  I did discuss realistically permanent sensitivity in that area is to be expected but better than what it is right now.  Right carpal tunnel syndrome - EMG/NCV confirmed. Symptom improvement is plateauing.  I did discuss a carpal tunnel steroid injection would be helpful.  We will wait for approval prior to proceeding with steroid injection.  Work status: Patient can return to work with 10 pound restrictions.    Follow Up: Corticosteroid injection time will be coordinated once patient gets approval from work comp    Diagnostic Studies:  MRI right elbow 3 views: Patient has moderate tendinosis of the common flexor tendon at the medial epicondyle with a small interstitial tear.  There is also a full-thickness tear of the common extensor tendon at its origin  EMG/NCV:    1.  Abnormal study.  2.  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 This is characterized by demyelination of motor and sensory fibers along with sensory axon loss.  3.  Electrodiagnostic evidence is suggestive of but not diagnostic for right ulnar neuropathy at the elbow.  The only abnormality noted is decreased conduction velocity distally which in isolation is nondiagnostic.  Conduction slowing could be equally impacted by focal demyelination near the cubital tunnel or by measurement error due to surgical repositioning of the nerve.  4.  No  electrodiagnostic evidence of right cervical radiculopathy.     Physical Exam:    Ht 6' (1.829 m)   Wt 273 lb (123.8 kg)   BMI 37.03 kg/m²     Constitutional: NAD. AOx3. Well-developed and Well-nourished.   Psychiatric: Normal mood/ affect/ behavior. Judgment and thought content normal.     Right upper Extremity:   Inspection:  Incision healed   Palpation: Mild to moderate tenderness over ulnar nerve anterior to medial eipcondyle   Motion: Elbow: Full elbow extension flexion. Full elbow motion, full pronation and supination.   Wrist: normal bilateral symmetric ext/flex/sup/pro  Finger: full composite fist     Median Nerve Exam Right   Phdurkan neg   Sensation Tingling in the middle finger, tingling in the radial aspect of the small finger and ulnar aspect of the ring finger distal to PIP joint.  Ulnar aspect of the small finger has no tingling.    DUSN -normal   PCBr Sensation normal   APB Weakness No   Thenar Atrophy No     Ulnar Nerve Exam Right   Tinels + tinels over ulnar nerve proximal to medial epicondyle   EF -   Hypermobility @ elbow neg   Sensation Abnormal see above   1st JIM Weakness No   Hypothenar Atrophy No     Radial Nerve Exam  Right Left   Radial Sensory normal normal     CC: Numbness and tingling    HPI: This 49 year old right-hand-dominant male presents with medial sided elbow pain with numbness and tingling in the ring finger and small finger.  It started on June 29.  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  Since then he has developed constant numbness and constant tingling in the ring finger and small finger.  He does have intermittent numbness in the dorsal ulnar sensory nerve branch.  He has shooting pain down the forearm into his fingers.  He has tried a corticosteroid injection, brace, and therapy without significant improvement in symptoms.  His pain is mild to moderate.    Interval Hx (7/10/23): No numbness.  Resolution of  tingling in the dorsal ulnar sensory nerve branch distribution.  Slight tingling in the small finger today.  He is very slowly making progress from a strength standpoint.  He does get ulnar nerve irritation with certain therapy related activities.    Interval Hx (8/21/23): Patient reports intermittent tingling in entire finger. He is making progress in therapy but is not back to normal yet. He reports pain everyday.     Interval Hx (6/19/24): Patient is in office for a follow-up visit for right elbow pain. He mentions the discomfort has been relatively consistent and is in office for further evaluation.     Interval Hx (8/12/2024): Patient reports numbness in the whole hand with it being most dense in SF/RF. It feels like nerve block has not worn off fully.    Interval Hx (8/26/2024): Patient reports improvement in likely nerve block related numbness and tingling.  With full elbow flexion he does report some tightness that he feels on the inside part of the elbow.    Interval Hx (9/30/2024): Numbness in the volar fingertips is resolved.  Still has some numbness in the dorsal ulnar sensory nerve branch.  Tingling is present in all the fingers.    Interval Hx (10/25/2024): Slowly improving strength. Continued sensitivity over medial aspect of elbow. Numbness in hand improving.    Interval Hx (11/22/2024): Slowly improving sensitivity over the medial aspect the elbow.  Tingling in the ulnar aspect of the small finger and dorsal ulnar sensory nerve branch is resolved.  He has no numbness but just tingling in the middle finger that is constant.  Tingling in the thumb and index finger are intermittent.  Tingling in the radial aspect of the small finger and ulnar aspect of the ring finger is improved and presents distal to only the PIP joint.    Occupation: Maintenance    Past Medical History:    ADD (attention deficit disorder)    Essential hypertension    Focal hemorrhagic contusion of cerebrum (HCC)    Intraparenchymal  hemorrhage of brain (HCC)    Migraines    Obstructive sleep apnea (adult) (pediatric)    AHI 8 REM AHI 36 SaO2 tiarra 73 % CPAP 10 Premier     Osteoarthrosis, unspecified whether generalized or localized, unspecified site    Sleep apnea    CPAP    Spondylosis of lumbar region without myelopathy or radiculopathy     Past Surgical History:   Procedure Laterality Date    Ankle fracture surgery      Colonoscopy N/A 5/23/2024    Procedure: COLONOSCOPY;  Surgeon: Toya Linn MD;  Location:  ENDOSCOPY    Drain/inject large joint/bursa Bilateral 10/26/2015    Procedure: BURSA INJECTION;  Surgeon: Taco Rea MD;  Location: [facility]    Fluoroscopic guidance needle placement Bilateral 10/26/2015    Procedure: BURSA INJECTION;  Surgeon: Taco Rea MD;  Location: [facility]    Other Left 09/2017    work injury , left wrist     Other surgical history      left wrist     Other surgical history Right 03/15/2023    elbow repair     Current Outpatient Medications   Medication Sig Dispense Refill    Meloxicam 15 MG Oral Tab Take 1 tablet (15 mg total) by mouth daily. 30 tablet 0    Tadalafil 10 MG Oral Tab Take 1 tablet (10 mg total) by mouth daily as needed for Erectile Dysfunction. 20 tablet 3    HYDROcodone-acetaminophen (NORCO) 5-325 MG Oral Tab Take 1 tablet by mouth every 6 (six) hours as needed for Pain. (Patient not taking: Reported on 8/26/2024) 20 tablet 0     No Known Allergies  Family History   Problem Relation Age of Onset    Personality Disorder Mother     Depression Mother     Anxiety Mother      Social History     Occupational History    Not on file   Tobacco Use    Smoking status: Never    Smokeless tobacco: Never   Vaping Use    Vaping status: Never Used   Substance and Sexual Activity    Alcohol use: Yes     Comment: Rarely    Drug use: No    Sexual activity: Not on file      Review of Systems (negative unless bolded):  General: fevers, chills, fatigue  CV:   chest pain, palpitations, leg swelling  Msk: bodyaches, neck pain, neck stiffness  Skin: rashes, open wounds, nonhealing ulcers  Hem: bleeds easily, bruise easily, immunocompromised  Neuro: dizziness, light headedness, headaches  Psych: anxious, depressed, anger issues    Jad Little MD   Hand, Wrist, & Elbow Surgery  [email or web address]  t: [phone]  f: [phone]

## 2025-01-10 ENCOUNTER — OFFICE VISIT (OUTPATIENT)
Dept: ORTHOPEDICS CLINIC | Facility: CLINIC | Age: 50
End: 2025-01-10
Payer: OTHER MISCELLANEOUS

## 2025-01-10 VITALS — HEIGHT: 72 IN | BODY MASS INDEX: 36.98 KG/M2 | WEIGHT: 273 LBS

## 2025-01-10 DIAGNOSIS — G56.21 ULNAR NEUROPATHY AT ELBOW OF RIGHT UPPER EXTREMITY: Primary | ICD-10-CM

## 2025-01-10 DIAGNOSIS — G56.01 CARPAL TUNNEL SYNDROME OF RIGHT WRIST: ICD-10-CM

## 2025-01-10 RX ORDER — BETAMETHASONE SODIUM PHOSPHATE AND BETAMETHASONE ACETATE 3; 3 MG/ML; MG/ML
6 INJECTION, SUSPENSION INTRA-ARTICULAR; INTRALESIONAL; INTRAMUSCULAR; SOFT TISSUE ONCE
Status: COMPLETED | OUTPATIENT
Start: 2025-01-10 | End: 2025-01-10

## 2025-01-10 RX ADMIN — BETAMETHASONE SODIUM PHOSPHATE AND BETAMETHASONE ACETATE 6 MG: 3; 3 INJECTION, SUSPENSION INTRA-ARTICULAR; INTRALESIONAL; INTRAMUSCULAR; SOFT TISSUE at 09:03:00

## 2025-01-10 NOTE — PROGRESS NOTES
Clinic Note       Assessment/Plan:  49 year old male    Right elbow medial epicondyle debridement with common flexor tendon repair and ulnar nerve transposition 3/15/2023 s/p neurolysis of ulnar nerve with nerve wrapping on 7/30/24-resolution of tingling in the ulnar aspect of the small finger and the dorsal ulnar sensory nerve branch.  Patient continues to have some tingling mostly PIP joint and distal and the radial aspect of the small finger and the ulnar aspect of the ring finger.  Sensitivity over the elbow will continue to improve until the 1 year boy.  I did discuss realistically permanent sensitivity in that area is to be expected but better than what it is right now.  Recommend continuing therapy status post carpal tunnel injection which was performed today.  If  strength does not improve status post corticosteroid injection for carpal tunnel at next visit we will obtain an FCE to determine permanent restrictions  Right carpal tunnel syndrome - EMG/NCV confirmed-I do believe that some of the  strength deficit is secondary to carpal tunnel syndrome.  We will proceed with a carpal tunnel steroid injection which should help with symptoms.  Work status: Patient can return to work with 15 pound restrictions.    Follow Up: 4 weeks    Injection:    Written consent was obtained.  The skin was prepped with alcohol.  Ethyl chloride spray was used anesthetize the superficial skin.  A 25-gauge needle was used to inject 1.5 mL mixture of 1 mL of 6 mg of betamethasone and 1 mL of 1% lidocaine into right carpal tunnel.  Hemostasis achieved.  Band-Aid was applied.  Patient tolerated procedure without complication.    Diagnostic Studies:  MRI right elbow 3 views: Patient has moderate tendinosis of the common flexor tendon at the medial epicondyle with a small interstitial tear.  There is also a full-thickness tear of the common extensor tendon at its origin  EMG/NCV:    1.  Abnormal study.  2.  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 This is characterized by demyelination of motor and sensory fibers along with sensory axon loss.  3.  Electrodiagnostic evidence is suggestive of but not diagnostic for right ulnar neuropathy at the elbow.  The only abnormality noted is decreased conduction velocity distally which in isolation is nondiagnostic.  Conduction slowing could be equally impacted by focal demyelination near the cubital tunnel or by measurement error due to surgical repositioning of the nerve.  4.  No electrodiagnostic evidence of right cervical radiculopathy.     Physical Exam:    Ht 6' (1.829 m)   Wt 273 lb (123.8 kg)   BMI 37.03 kg/m²     Constitutional: NAD. AOx3. Well-developed and Well-nourished.   Psychiatric: Normal mood/ affect/ behavior. Judgment and thought content normal.     Right upper Extremity:   Inspection:  Incision healed   Palpation: Mild to moderate tenderness over ulnar nerve anterior to medial eipcondyle   Motion: Elbow: Full elbow extension flexion. Full elbow motion, full pronation and supination.   Wrist: normal bilateral symmetric ext/flex/sup/pro  Finger: full composite fist     Median Nerve Exam Right   Phdurkan neg   Sensation Tingling in the middle finger, tingling in the radial aspect of the small finger and ulnar aspect of the ring finger distal to PIP joint.  Ulnar aspect of the small finger has no tingling.    DUSN -normal   PCBr Sensation normal   APB Weakness No   Thenar Atrophy No     Ulnar Nerve Exam Right   Tinels + tinels over ulnar nerve proximal to medial epicondyle   EF -   Hypermobility @ elbow neg   Sensation Abnormal see above   1st JIM Weakness No   Hypothenar Atrophy No     Radial Nerve Exam  Right Left   Radial Sensory normal normal     CC: Numbness and tingling    HPI: This 49 year old right-hand-dominant male presents with medial sided elbow pain with numbness and tingling in the ring finger and small finger.  It started  on June 29.  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  Since then he has developed constant numbness and constant tingling in the ring finger and small finger.  He does have intermittent numbness in the dorsal ulnar sensory nerve branch.  He has shooting pain down the forearm into his fingers.  He has tried a corticosteroid injection, brace, and therapy without significant improvement in symptoms.  His pain is mild to moderate.    Interval Hx (7/10/23): No numbness.  Resolution of tingling in the dorsal ulnar sensory nerve branch distribution.  Slight tingling in the small finger today.  He is very slowly making progress from a strength standpoint.  He does get ulnar nerve irritation with certain therapy related activities.    Interval Hx (8/21/23): Patient reports intermittent tingling in entire finger. He is making progress in therapy but is not back to normal yet. He reports pain everyday.     Interval Hx (6/19/24): Patient is in office for a follow-up visit for right elbow pain. He mentions the discomfort has been relatively consistent and is in office for further evaluation.     Interval Hx (8/12/2024): Patient reports numbness in the whole hand with it being most dense in SF/RF. It feels like nerve block has not worn off fully.    Interval Hx (8/26/2024): Patient reports improvement in likely nerve block related numbness and tingling.  With full elbow flexion he does report some tightness that he feels on the inside part of the elbow.    Interval Hx (9/30/2024): Numbness in the volar fingertips is resolved.  Still has some numbness in the dorsal ulnar sensory nerve branch.  Tingling is present in all the fingers.    Interval Hx (10/25/2024): Slowly improving strength. Continued sensitivity over medial aspect of elbow. Numbness in hand improving.    Interval Hx (11/22/2024): Slowly improving sensitivity over the medial aspect the elbow.  Tingling in the  ulnar aspect of the small finger and dorsal ulnar sensory nerve branch is resolved.  He has no numbness but just tingling in the middle finger that is constant.  Tingling in the thumb and index finger are intermittent.  Tingling in the radial aspect of the small finger and ulnar aspect of the ring finger is improved and presents distal to only the PIP joint.    Interval Hx (1/10/2025): Strength gains noted with therapy however gains are fairly minimal.      Occupation: Maintenance    Past Medical History:    ADD (attention deficit disorder)    Essential hypertension    Focal hemorrhagic contusion of cerebrum (HCC)    Intraparenchymal hemorrhage of brain (HCC)    Migraines    Obstructive sleep apnea (adult) (pediatric)    AHI 8 REM AHI 36 SaO2 tiarra 73 % CPAP 10 Premier     Osteoarthrosis, unspecified whether generalized or localized, unspecified site    Sleep apnea    CPAP    Spondylosis of lumbar region without myelopathy or radiculopathy     Past Surgical History:   Procedure Laterality Date    Ankle fracture surgery      Colonoscopy N/A 5/23/2024    Procedure: COLONOSCOPY;  Surgeon: Toya Linn MD;  Location:  ENDOSCOPY    Drain/inject large joint/bursa Bilateral 10/26/2015    Procedure: BURSA INJECTION;  Surgeon: Taco Rea MD;  Location: [facility]    Fluoroscopic guidance needle placement Bilateral 10/26/2015    Procedure: BURSA INJECTION;  Surgeon: Taco Rea MD;  Location: [facility]    Other Left 09/2017    work injury , left wrist     Other surgical history      left wrist     Other surgical history Right 03/15/2023    elbow repair     Current Outpatient Medications   Medication Sig Dispense Refill    Meloxicam 15 MG Oral Tab Take 1 tablet (15 mg total) by mouth daily. 30 tablet 0    Tadalafil 10 MG Oral Tab Take 1 tablet (10 mg total) by mouth daily as needed for Erectile Dysfunction. 20 tablet 3    HYDROcodone-acetaminophen (NORCO) 5-325 MG Oral  Tab Take 1 tablet by mouth every 6 (six) hours as needed for Pain. (Patient not taking: Reported on 1/10/2025) 20 tablet 0     No Known Allergies  Family History   Problem Relation Age of Onset    Personality Disorder Mother     Depression Mother     Anxiety Mother      Social History     Occupational History    Not on file   Tobacco Use    Smoking status: Never    Smokeless tobacco: Never   Vaping Use    Vaping status: Never Used   Substance and Sexual Activity    Alcohol use: Yes     Comment: Rarely    Drug use: No    Sexual activity: Not on file      Review of Systems (negative unless bolded):  General: fevers, chills, fatigue  CV:  chest pain, palpitations, leg swelling  Msk: bodyaches, neck pain, neck stiffness  Skin: rashes, open wounds, nonhealing ulcers  Hem: bleeds easily, bruise easily, immunocompromised  Neuro: dizziness, light headedness, headaches  Psych: anxious, depressed, anger issues    Jad Little MD   Hand, Wrist, & Elbow Surgery  [email or web address]  t: [phone]  f: [phone]

## 2025-01-23 ENCOUNTER — MED REC SCAN ONLY (OUTPATIENT)
Dept: ORTHOPEDICS CLINIC | Facility: CLINIC | Age: 50
End: 2025-01-23

## 2025-02-07 ENCOUNTER — OFFICE VISIT (OUTPATIENT)
Dept: ORTHOPEDICS CLINIC | Facility: CLINIC | Age: 50
End: 2025-02-07
Payer: OTHER MISCELLANEOUS

## 2025-02-07 VITALS — WEIGHT: 273 LBS | HEIGHT: 72 IN | BODY MASS INDEX: 36.98 KG/M2

## 2025-02-07 DIAGNOSIS — G56.21 ULNAR NEUROPATHY AT ELBOW OF RIGHT UPPER EXTREMITY: Primary | ICD-10-CM

## 2025-02-07 DIAGNOSIS — G56.01 CARPAL TUNNEL SYNDROME OF RIGHT WRIST: ICD-10-CM

## 2025-02-07 PROCEDURE — 99214 OFFICE O/P EST MOD 30 MIN: CPT | Performed by: ORTHOPAEDIC SURGERY

## 2025-02-07 NOTE — PROGRESS NOTES
Clinic Note     Assessment/Plan:  49 year old male    Right elbow medial epicondyle debridement with common flexor tendon repair and ulnar nerve transposition 3/15/2023 s/p neurolysis of ulnar nerve with nerve wrapping on 7/30/24-intermittent tingling is noted.  He is more slowly progressing in therapy.  Will try 2 weeks of work conditioning as the patient would like to be able to return to his work.  If significant gains are not noted and work conditioning and FCE will be completed to give permanent restrictions.  He can complete out his 2 sessions of therapy next week prior to work conditioning.  Right carpal tunnel syndrome - EMG/NCV confirmed-no improvement with a carpal tunnel injection possibly aggravated his median nerve symptoms.  This condition preexisted the work-related injury, and could benefit from tunnel release outside of his work comp claim.  Work status: Patient can return to work with 20 pound restrictions.    Follow Up: 4 weeks    Diagnostic Studies:  MRI right elbow 3 views: Patient has moderate tendinosis of the common flexor tendon at the medial epicondyle with a small interstitial tear.  There is also a full-thickness tear of the common extensor tendon at its origin  EMG/NCV:    1.  Abnormal study.  2.  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 This is characterized by demyelination of motor and sensory fibers along with sensory axon loss.  3.  Electrodiagnostic evidence is suggestive of but not diagnostic for right ulnar neuropathy at the elbow.  The only abnormality noted is decreased conduction velocity distally which in isolation is nondiagnostic.  Conduction slowing could be equally impacted by focal demyelination near the cubital tunnel or by measurement error due to surgical repositioning of the nerve.  4.  No electrodiagnostic evidence of right cervical radiculopathy.     Physical Exam:    Ht 6' (1.829 m)   Wt 273 lb (123.8 kg)   BMI 37.03  kg/m²     Constitutional: NAD. AOx3. Well-developed and Well-nourished.   Psychiatric: Normal mood/ affect/ behavior. Judgment and thought content normal.     Right upper Extremity:   Inspection:  Incision healed   Palpation: Mild to moderate tenderness over ulnar nerve anterior to medial eipcondyle   Motion: Elbow: Full elbow extension flexion. Full elbow motion, full pronation and supination.   Wrist: normal bilateral symmetric ext/flex/sup/pro  Finger: full composite fist     Median Nerve Exam Right   Phdurkan neg   Sensation Tingling in the middle finger, tingling in the radial aspect of the small finger and ulnar aspect of the ring finger distal to PIP joint.  Ulnar aspect of the small finger has no tingling.    DUSN -normal   PCBr Sensation normal   APB Weakness No   Thenar Atrophy No     Ulnar Nerve Exam Right   Tinels + tinels over ulnar nerve proximal to medial epicondyle   EF -   Hypermobility @ elbow neg   Sensation Abnormal see above   1st JIM Weakness No   Hypothenar Atrophy No     Radial Nerve Exam  Right Left   Radial Sensory normal normal     CC: Numbness and tingling    HPI: This 49 year old right-hand-dominant male presents with medial sided elbow pain with numbness and tingling in the ring finger and small finger.  It started on June 29.  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  Since then he has developed constant numbness and constant tingling in the ring finger and small finger.  He does have intermittent numbness in the dorsal ulnar sensory nerve branch.  He has shooting pain down the forearm into his fingers.  He has tried a corticosteroid injection, brace, and therapy without significant improvement in symptoms.  His pain is mild to moderate.    Interval Hx (7/10/23): No numbness.  Resolution of tingling in the dorsal ulnar sensory nerve branch distribution.  Slight tingling in the small finger today.  He is very slowly making  progress from a strength standpoint.  He does get ulnar nerve irritation with certain therapy related activities.    Interval Hx (8/21/23): Patient reports intermittent tingling in entire finger. He is making progress in therapy but is not back to normal yet. He reports pain everyday.     Interval Hx (6/19/24): Patient is in office for a follow-up visit for right elbow pain. He mentions the discomfort has been relatively consistent and is in office for further evaluation.     Interval Hx (8/12/2024): Patient reports numbness in the whole hand with it being most dense in SF/RF. It feels like nerve block has not worn off fully.    Interval Hx (8/26/2024): Patient reports improvement in likely nerve block related numbness and tingling.  With full elbow flexion he does report some tightness that he feels on the inside part of the elbow.    Interval Hx (9/30/2024): Numbness in the volar fingertips is resolved.  Still has some numbness in the dorsal ulnar sensory nerve branch.  Tingling is present in all the fingers.    Interval Hx (10/25/2024): Slowly improving strength. Continued sensitivity over medial aspect of elbow. Numbness in hand improving.    Interval Hx (11/22/2024): Slowly improving sensitivity over the medial aspect the elbow.  Tingling in the ulnar aspect of the small finger and dorsal ulnar sensory nerve branch is resolved.  He has no numbness but just tingling in the middle finger that is constant.  Tingling in the thumb and index finger are intermittent.  Tingling in the radial aspect of the small finger and ulnar aspect of the ring finger is improved and presents distal to only the PIP joint.    Interval Hx (1/10/2025): Strength gains noted with therapy however gains are fairly minimal.    Interval Hx (2/7/2025): Further but very small gains in therapy noted.  The carpal tunnel steroid injection did not provide any relief of his symptoms.  No numbness but continues to have tingling intermittently in the  ring finger and small finger    Occupation: Maintenance    Past Medical History:    ADD (attention deficit disorder)    Essential hypertension    Focal hemorrhagic contusion of cerebrum (HCC)    Intraparenchymal hemorrhage of brain (HCC)    Migraines    Obstructive sleep apnea (adult) (pediatric)    AHI 8 REM AHI 36 SaO2 tiarra 73 % CPAP 10 Premier     Osteoarthrosis, unspecified whether generalized or localized, unspecified site    Sleep apnea    CPAP    Spondylosis of lumbar region without myelopathy or radiculopathy     Past Surgical History:   Procedure Laterality Date    Ankle fracture surgery      Colonoscopy N/A 5/23/2024    Procedure: COLONOSCOPY;  Surgeon: Toya Linn MD;  Location:  ENDOSCOPY    Drain/inject large joint/bursa Bilateral 10/26/2015    Procedure: BURSA INJECTION;  Surgeon: Taco Rea MD;  Location: [facility]    Fluoroscopic guidance needle placement Bilateral 10/26/2015    Procedure: BURSA INJECTION;  Surgeon: Taco Rea MD;  Location: [facility]    Other Left 09/2017    work injury , left wrist     Other surgical history      left wrist     Other surgical history Right 03/15/2023    elbow repair     Current Outpatient Medications   Medication Sig Dispense Refill    Meloxicam 15 MG Oral Tab Take 1 tablet (15 mg total) by mouth daily. 30 tablet 0    Tadalafil 10 MG Oral Tab Take 1 tablet (10 mg total) by mouth daily as needed for Erectile Dysfunction. 20 tablet 3    HYDROcodone-acetaminophen (NORCO) 5-325 MG Oral Tab Take 1 tablet by mouth every 6 (six) hours as needed for Pain. (Patient not taking: Reported on 8/26/2024) 20 tablet 0     No Known Allergies  Family History   Problem Relation Age of Onset    Personality Disorder Mother     Depression Mother     Anxiety Mother      Social History     Occupational History    Not on file   Tobacco Use    Smoking status: Never    Smokeless tobacco: Never   Vaping Use    Vaping status: Never  Used   Substance and Sexual Activity    Alcohol use: Yes     Comment: Rarely    Drug use: No    Sexual activity: Not on file      Review of Systems (negative unless bolded):  General: fevers, chills, fatigue  CV:  chest pain, palpitations, leg swelling  Msk: bodyaches, neck pain, neck stiffness  Skin: rashes, open wounds, nonhealing ulcers  Hem: bleeds easily, bruise easily, immunocompromised  Neuro: dizziness, light headedness, headaches  Psych: anxious, depressed, anger issues    Jad Little MD   Hand, Wrist, & Elbow Surgery  [email or web address]  t: [phone]  f: [phone]

## 2025-02-14 NOTE — PROGRESS NOTES
"Eliseo Sun" Jarett was seen and treated in our emergency department on 2/14/2025.  He may return to work on 02/17/2025.       If you have any questions or concerns, please don't hesitate to call.      Vianca Monge, RN" Dx: Right Knee MCL Sprain         Authorized # of Visits:  8 (O)         Next MD visit: none scheduled  Fall Risk: standard         Precautions: n/a             Subjective: States that he had \"a little pain\" in the knee.   References the anterior medial Single Leg Bridge x 20 Single Leg Bridge x 20             Manual PT (25 min) Manual PT (25 min) Manual PT (25 min) Manual PT (25 min) Manual PT (25 min) Manual PT (25 min)    Posterior hip capsule stretch  Posterior hip capsule stretch Posterior hip capsul

## 2025-02-18 ENCOUNTER — MED REC SCAN ONLY (OUTPATIENT)
Dept: ORTHOPEDICS CLINIC | Facility: CLINIC | Age: 50
End: 2025-02-18

## 2025-03-04 ENCOUNTER — TELEPHONE (OUTPATIENT)
Facility: CLINIC | Age: 50
End: 2025-03-04

## 2025-03-04 NOTE — TELEPHONE ENCOUNTER
Kaylynn reached out and stated that she faxed progress notes last week for patient and is requesting that it is faxed back over asap due to WC purposes.    Fax number: [phone]    Please advise.

## 2025-03-05 ENCOUNTER — TELEPHONE (OUTPATIENT)
Facility: CLINIC | Age: 50
End: 2025-03-05

## 2025-03-05 NOTE — TELEPHONE ENCOUNTER
Mackenzie called for soniaoena she said it was faxed over January 28th and called on 2/7. Today she has received medical records and bills instead. She is looking for updates     Can someone call her back right away [phone]

## 2025-03-07 ENCOUNTER — MED REC SCAN ONLY (OUTPATIENT)
Dept: ORTHOPEDICS CLINIC | Facility: CLINIC | Age: 50
End: 2025-03-07

## 2025-03-21 ENCOUNTER — OFFICE VISIT (OUTPATIENT)
Dept: ORTHOPEDICS CLINIC | Facility: CLINIC | Age: 50
End: 2025-03-21

## 2025-03-21 VITALS — BODY MASS INDEX: 36.98 KG/M2 | WEIGHT: 273 LBS | HEIGHT: 72 IN

## 2025-03-21 DIAGNOSIS — G56.21 ULNAR NEUROPATHY AT ELBOW OF RIGHT UPPER EXTREMITY: Primary | ICD-10-CM

## 2025-03-21 PROCEDURE — 99213 OFFICE O/P EST LOW 20 MIN: CPT | Performed by: ORTHOPAEDIC SURGERY

## 2025-03-21 NOTE — PROGRESS NOTES
Clinic Note     Assessment/Plan:  49 year old male    Right elbow medial epicondyle debridement with common flexor tendon repair and ulnar nerve transposition 3/15/2023 s/p neurolysis of ulnar nerve with nerve wrapping on 7/30/24- no significant gains have been noted with work conditioning now 3 to 4 weeks in.  At this point would recommend, recommend discontinuing work conditioning and obtaining a functional capacity evaluation.  Patient will message me once the date for the functional capacity evaluation is is established that I can see him a few days afterwards to deem the patient at I with permanent restriction  Right carpal tunnel syndrome - EMG/NCV confirmed-no improvement with a carpal tunnel injection possibly aggravated his median nerve symptoms.  This condition preexisted the work-related injury, and could benefit from tunnel release outside of his work comp claim.  Work status: Maintain 20 pound work restrictions until the functional capacity evaluation is performed.    Follow Up: To be determined based on functional capacity eval date    Diagnostic Studies:  MRI right elbow 3 views: Patient has moderate tendinosis of the common flexor tendon at the medial epicondyle with a small interstitial tear.  There is also a full-thickness tear of the common extensor tendon at its origin  EMG/NCV:    1.  Abnormal study.  2.  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 This is characterized by demyelination of motor and sensory fibers along with sensory axon loss.  3.  Electrodiagnostic evidence is suggestive of but not diagnostic for right ulnar neuropathy at the elbow.  The only abnormality noted is decreased conduction velocity distally which in isolation is nondiagnostic.  Conduction slowing could be equally impacted by focal demyelination near the cubital tunnel or by measurement error due to surgical repositioning of the nerve.  4.  No electrodiagnostic evidence of  right cervical radiculopathy.     Physical Exam:    Ht 6' (1.829 m)   Wt 273 lb (123.8 kg)   BMI 37.03 kg/m²     Constitutional: NAD. AOx3. Well-developed and Well-nourished.   Psychiatric: Normal mood/ affect/ behavior. Judgment and thought content normal.     Right upper Extremity:   Inspection:  Incision healed   Palpation: Mild to moderate tenderness over ulnar nerve anterior to medial eipcondyle   Motion: Elbow: Full elbow extension flexion. Full elbow motion, full pronation and supination.   Wrist: normal bilateral symmetric ext/flex/sup/pro  Finger: full composite fist     Median Nerve Exam Right   Phdurkan neg   Sensation Tingling in the middle finger, ring finger and small finger        Tingling  DUSN -normal   PCBr Sensation normal   APB Weakness No   Thenar Atrophy No     Ulnar Nerve Exam Right   Tinels + tinels over ulnar nerve proximal to medial epicondyle   EF -   Hypermobility @ elbow neg   Sensation Abnormal see above   1st JIM Weakness No   Hypothenar Atrophy No     Radial Nerve Exam  Right Left   Radial Sensory normal normal     CC: Numbness and tingling    HPI: This 49 year old right-hand-dominant male presents with medial sided elbow pain with numbness and tingling in the ring finger and small finger.  It started on June 29.  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  Since then he has developed constant numbness and constant tingling in the ring finger and small finger.  He does have intermittent numbness in the dorsal ulnar sensory nerve branch.  He has shooting pain down the forearm into his fingers.  He has tried a corticosteroid injection, brace, and therapy without significant improvement in symptoms.  His pain is mild to moderate.    Interval Hx (7/10/23): No numbness.  Resolution of tingling in the dorsal ulnar sensory nerve branch distribution.  Slight tingling in the small finger today.  He is very slowly making progress from a  strength standpoint.  He does get ulnar nerve irritation with certain therapy related activities.    Interval Hx (8/21/23): Patient reports intermittent tingling in entire finger. He is making progress in therapy but is not back to normal yet. He reports pain everyday.     Interval Hx (6/19/24): Patient is in office for a follow-up visit for right elbow pain. He mentions the discomfort has been relatively consistent and is in office for further evaluation.     Interval Hx (8/12/2024): Patient reports numbness in the whole hand with it being most dense in SF/RF. It feels like nerve block has not worn off fully.    Interval Hx (8/26/2024): Patient reports improvement in likely nerve block related numbness and tingling.  With full elbow flexion he does report some tightness that he feels on the inside part of the elbow.    Interval Hx (9/30/2024): Numbness in the volar fingertips is resolved.  Still has some numbness in the dorsal ulnar sensory nerve branch.  Tingling is present in all the fingers.    Interval Hx (10/25/2024): Slowly improving strength. Continued sensitivity over medial aspect of elbow. Numbness in hand improving.    Interval Hx (11/22/2024): Slowly improving sensitivity over the medial aspect the elbow.  Tingling in the ulnar aspect of the small finger and dorsal ulnar sensory nerve branch is resolved.  He has no numbness but just tingling in the middle finger that is constant.  Tingling in the thumb and index finger are intermittent.  Tingling in the radial aspect of the small finger and ulnar aspect of the ring finger is improved and presents distal to only the PIP joint.    Interval Hx (1/10/2025): Strength gains noted with therapy however gains are fairly minimal.    Interval Hx (2/7/2025): Further but very small gains in therapy noted.  The carpal tunnel steroid injection did not provide any relief of his symptoms.  No numbness but continues to have tingling intermittently in the ring finger and  small finger    Interval Hx (3/21/2025): Patient continues to experience intermittent tingling in the ring finger and small finger and as of a week ago has had more consistent tingling in the ulnar nerve distribution.  Does not note any significant gains in work conditioning.  Patient has also noted intermittent tingling in the median nerve distribution    Occupation: Maintenance    Past Medical History:    ADD (attention deficit disorder)    Essential hypertension    Focal hemorrhagic contusion of cerebrum (HCC)    Intraparenchymal hemorrhage of brain (HCC)    Migraines    Obstructive sleep apnea (adult) (pediatric)    AHI 8 REM AHI 36 SaO2 tiarra 73 % CPAP 10 Premier     Osteoarthrosis, unspecified whether generalized or localized, unspecified site    Sleep apnea    CPAP    Spondylosis of lumbar region without myelopathy or radiculopathy     Past Surgical History:   Procedure Laterality Date    Ankle fracture surgery      Colonoscopy N/A 5/23/2024    Procedure: COLONOSCOPY;  Surgeon: Toya Linn MD;  Location:  ENDOSCOPY    Drain/inject large joint/bursa Bilateral 10/26/2015    Procedure: BURSA INJECTION;  Surgeon: Taco Rea MD;  Location: [facility]    Fluoroscopic guidance needle placement Bilateral 10/26/2015    Procedure: BURSA INJECTION;  Surgeon: Taco Rea MD;  Location: [facility]    Other Left 09/2017    work injury , left wrist     Other surgical history      left wrist     Other surgical history Right 03/15/2023    elbow repair     Current Outpatient Medications   Medication Sig Dispense Refill    Meloxicam 15 MG Oral Tab Take 1 tablet (15 mg total) by mouth daily. 30 tablet 0    Tadalafil 10 MG Oral Tab Take 1 tablet (10 mg total) by mouth daily as needed for Erectile Dysfunction. 20 tablet 3    HYDROcodone-acetaminophen (NORCO) 5-325 MG Oral Tab Take 1 tablet by mouth every 6 (six) hours as needed for Pain. (Patient not taking: Reported on  3/21/2025) 20 tablet 0     No Known Allergies  Family History   Problem Relation Age of Onset    Personality Disorder Mother     Depression Mother     Anxiety Mother      Social History     Occupational History    Not on file   Tobacco Use    Smoking status: Never    Smokeless tobacco: Never   Vaping Use    Vaping status: Never Used   Substance and Sexual Activity    Alcohol use: Yes     Comment: Rarely    Drug use: No    Sexual activity: Not on file      Review of Systems (negative unless bolded):  General: fevers, chills, fatigue  CV:  chest pain, palpitations, leg swelling  Msk: bodyaches, neck pain, neck stiffness  Skin: rashes, open wounds, nonhealing ulcers  Hem: bleeds easily, bruise easily, immunocompromised  Neuro: dizziness, light headedness, headaches  Psych: anxious, depressed, anger issues    Jad Little MD   Hand, Wrist, & Elbow Surgery  [email or web address]  t: [phone]  f: [phone]

## 2025-03-26 ENCOUNTER — PATIENT MESSAGE (OUTPATIENT)
Dept: ORTHOPEDICS CLINIC | Facility: CLINIC | Age: 50
End: 2025-03-26

## 2025-03-26 NOTE — TELEPHONE ENCOUNTER
Patient will have FCE on 4/1/25. He was told report would take 5 days to be ready. When would you like his follow up to be?

## 2025-04-10 NOTE — TELEPHONE ENCOUNTER
Obtained FCE results from [facility]. Left on Dr. Little's desk so he has a copy for tomorrows visit. Emailed it to him as well.   Additional Progress Note...

## 2025-04-11 ENCOUNTER — OFFICE VISIT (OUTPATIENT)
Dept: ORTHOPEDICS CLINIC | Facility: CLINIC | Age: 50
End: 2025-04-11
Payer: OTHER MISCELLANEOUS

## 2025-04-11 VITALS — BODY MASS INDEX: 36.98 KG/M2 | HEIGHT: 72 IN | WEIGHT: 273 LBS

## 2025-04-11 DIAGNOSIS — G56.21 ULNAR NEUROPATHY AT ELBOW OF RIGHT UPPER EXTREMITY: Primary | ICD-10-CM

## 2025-04-11 DIAGNOSIS — G56.01 CARPAL TUNNEL SYNDROME OF RIGHT WRIST: ICD-10-CM

## 2025-04-11 PROCEDURE — 99213 OFFICE O/P EST LOW 20 MIN: CPT | Performed by: ORTHOPAEDIC SURGERY

## 2025-04-11 NOTE — PROGRESS NOTES
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  Corroborate these results and patient was given permanent restrictions based on FCE results.  Patient is at [facility].  Persistent tingling in the ulnar nerve distribution is likely permanent and unlikely to improve at this point.  Right carpal tunnel syndrome - EMG/NCV confirmed-no improvement with a carpal tunnel injection possibly aggravated his median nerve symptoms.  This condition preexisted the work-related injury, and could benefit from tunnel release outside of his work comp claim.  Work status: Per FCE, at [facility]    Follow Up: To be determined based on functional capacity eval date    Diagnostic Studies:  MRI right elbow 3 views: Patient has moderate tendinosis of the common flexor tendon at the medial epicondyle with a small interstitial tear.  There is also a full-thickness tear of the common extensor tendon at its origin  EMG/NCV:    1.  Abnormal study.  2.  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 This is characterized by demyelination of motor and sensory fibers along with sensory axon loss.  3.  Electrodiagnostic evidence is suggestive of but not diagnostic for right ulnar neuropathy at the elbow.  The only abnormality noted is decreased conduction velocity distally which in isolation is nondiagnostic.  Conduction slowing could be equally impacted by focal demyelination near the cubital tunnel or by measurement error due to surgical repositioning of the nerve.  4.  No electrodiagnostic evidence of right cervical radiculopathy.     Physical Exam:    Ht 6' (1.829 m)   Wt 273 lb (123.8 kg)   BMI 37.03 kg/m²     Constitutional: NAD. AOx3. Well-developed and Well-nourished.    Psychiatric: Normal mood/ affect/ behavior. Judgment and thought content normal.     Right upper Extremity:   Inspection:  Incision healed   Palpation: Mild to moderate tenderness over ulnar nerve anterior to medial eipcondyle   Motion: Elbow: Full elbow extension flexion. Full elbow motion, full pronation and supination.   Wrist: normal bilateral symmetric ext/flex/sup/pro  Finger: full composite fist     Median Nerve Exam Right   Phdurkan neg   Sensation Sensation intact in the thumb hand index finger to 0.07 g diminished in the middle and ring finger but intact to 0.4 g up to DIP crease        Tingling  DUSN -normal, [place] Valentine intact to 0.07 g in the small finger   PCBr Sensation normal   APB Weakness No   Thenar Atrophy No     Ulnar Nerve Exam Right   Tinels + tinels over ulnar nerve proximal to medial epicondyle   EF -   Hypermobility @ elbow neg   Sensation Abnormal see above   1st JIM Weakness No   Hypothenar Atrophy No     Radial Nerve Exam  Right Left   Radial Sensory normal normal     CC: Numbness and tingling    HPI: This 49 year old right-hand-dominant male presents with medial sided elbow pain with numbness and tingling in the ring finger and small finger.  It started on June 29.  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  Since then he has developed constant numbness and constant tingling in the ring finger and small finger.  He does have intermittent numbness in the dorsal ulnar sensory nerve branch.  He has shooting pain down the forearm into his fingers.  He has tried a corticosteroid injection, brace, and therapy without significant improvement in symptoms.  His pain is mild to moderate.    Interval Hx (7/10/23): No numbness.  Resolution of tingling in the dorsal ulnar sensory nerve branch distribution.  Slight tingling in the small finger today.  He is very slowly making progress from a strength standpoint.  He does get ulnar nerve  irritation with certain therapy related activities.    Interval Hx (8/21/23): Patient reports intermittent tingling in entire finger. He is making progress in therapy but is not back to normal yet. He reports pain everyday.     Interval Hx (6/19/24): Patient is in office for a follow-up visit for right elbow pain. He mentions the discomfort has been relatively consistent and is in office for further evaluation.     Interval Hx (8/12/2024): Patient reports numbness in the whole hand with it being most dense in SF/RF. It feels like nerve block has not worn off fully.    Interval Hx (8/26/2024): Patient reports improvement in likely nerve block related numbness and tingling.  With full elbow flexion he does report some tightness that he feels on the inside part of the elbow.    Interval Hx (9/30/2024): Numbness in the volar fingertips is resolved.  Still has some numbness in the dorsal ulnar sensory nerve branch.  Tingling is present in all the fingers.    Interval Hx (10/25/2024): Slowly improving strength. Continued sensitivity over medial aspect of elbow. Numbness in hand improving.    Interval Hx (11/22/2024): Slowly improving sensitivity over the medial aspect the elbow.  Tingling in the ulnar aspect of the small finger and dorsal ulnar sensory nerve branch is resolved.  He has no numbness but just tingling in the middle finger that is constant.  Tingling in the thumb and index finger are intermittent.  Tingling in the radial aspect of the small finger and ulnar aspect of the ring finger is improved and presents distal to only the PIP joint.    Interval Hx (1/10/2025): Strength gains noted with therapy however gains are fairly minimal.    Interval Hx (2/7/2025): Further but very small gains in therapy noted.  The carpal tunnel steroid injection did not provide any relief of his symptoms.  No numbness but continues to have tingling intermittently in the ring finger and small finger    Interval Hx (3/21/2025):  Patient continues to experience intermittent tingling in the ring finger and small finger and as of a week ago has had more consistent tingling in the ulnar nerve distribution.  Does not note any significant gains in work conditioning.  Patient has also noted intermittent tingling in the median nerve distribution    Interval Hx (4/11/2025): No significant changes in symptoms.    Occupation: Maintenance    Past Medical History:    ADD (attention deficit disorder)    Essential hypertension    Focal hemorrhagic contusion of cerebrum (HCC)    Intraparenchymal hemorrhage of brain (HCC)    Migraines    Obstructive sleep apnea (adult) (pediatric)    AHI 8 REM AHI 36 SaO2 tiarra 73 % CPAP 10 Premier     Osteoarthrosis, unspecified whether generalized or localized, unspecified site    Sleep apnea    CPAP    Spondylosis of lumbar region without myelopathy or radiculopathy     Past Surgical History:   Procedure Laterality Date    Ankle fracture surgery      Colonoscopy N/A 5/23/2024    Procedure: COLONOSCOPY;  Surgeon: Toya Linn MD;  Location:  ENDOSCOPY    Drain/inject large joint/bursa Bilateral 10/26/2015    Procedure: BURSA INJECTION;  Surgeon: Taco Rea MD;  Location: [facility]    Fluoroscopic guidance needle placement Bilateral 10/26/2015    Procedure: BURSA INJECTION;  Surgeon: Taco Rea MD;  Location: [facility]    Other Left 09/2017    work injury , left wrist     Other surgical history      left wrist     Other surgical history Right 03/15/2023    elbow repair     Current Outpatient Medications   Medication Sig Dispense Refill    Meloxicam 15 MG Oral Tab Take 1 tablet (15 mg total) by mouth daily. 30 tablet 0    Tadalafil 10 MG Oral Tab Take 1 tablet (10 mg total) by mouth daily as needed for Erectile Dysfunction. 20 tablet 3    HYDROcodone-acetaminophen (NORCO) 5-325 MG Oral Tab Take 1 tablet by mouth every 6 (six) hours as needed for Pain. (Patient not  taking: Reported on 4/11/2025) 20 tablet 0     No Known Allergies  Family History   Problem Relation Age of Onset    Personality Disorder Mother     Depression Mother     Anxiety Mother      Social History     Occupational History    Not on file   Tobacco Use    Smoking status: Never    Smokeless tobacco: Never   Vaping Use    Vaping status: Never Used   Substance and Sexual Activity    Alcohol use: Yes     Comment: Rarely    Drug use: No    Sexual activity: Not on file      Review of Systems (negative unless bolded):  General: fevers, chills, fatigue  CV:  chest pain, palpitations, leg swelling  Msk: bodyaches, neck pain, neck stiffness  Skin: rashes, open wounds, nonhealing ulcers  Hem: bleeds easily, bruise easily, immunocompromised  Neuro: dizziness, light headedness, headaches  Psych: anxious, depressed, anger issues    Jad Little MD   Hand, Wrist, & Elbow Surgery  [email or web address]  t: [phone]  f: [phone]

## 2025-06-20 ENCOUNTER — PATIENT MESSAGE (OUTPATIENT)
Dept: FAMILY MEDICINE CLINIC | Facility: CLINIC | Age: 50
End: 2025-06-20

## 2025-06-20 DIAGNOSIS — Z12.5 SCREENING FOR PROSTATE CANCER: ICD-10-CM

## 2025-06-20 DIAGNOSIS — R73.9 ELEVATED BLOOD SUGAR: ICD-10-CM

## 2025-06-20 DIAGNOSIS — Z00.00 LABORATORY EXAMINATION ORDERED AS PART OF A ROUTINE GENERAL MEDICAL EXAMINATION: Primary | ICD-10-CM

## 2025-06-24 ENCOUNTER — LAB ENCOUNTER (OUTPATIENT)
Dept: LAB | Age: 50
End: 2025-06-24
Attending: FAMILY MEDICINE
Payer: MEDICAID

## 2025-06-24 DIAGNOSIS — R73.9 ELEVATED BLOOD SUGAR: ICD-10-CM

## 2025-06-24 DIAGNOSIS — Z12.5 SCREENING FOR PROSTATE CANCER: ICD-10-CM

## 2025-06-24 DIAGNOSIS — Z00.00 LABORATORY EXAMINATION ORDERED AS PART OF A ROUTINE GENERAL MEDICAL EXAMINATION: ICD-10-CM

## 2025-06-24 LAB
ALBUMIN SERPL-MCNC: 4.9 G/DL (ref 3.2–4.8)
ALBUMIN/GLOB SERPL: 2.1 {RATIO} (ref 1–2)
ALP LIVER SERPL-CCNC: 81 U/L (ref 45–117)
ALT SERPL-CCNC: 77 U/L (ref 10–49)
ANION GAP SERPL CALC-SCNC: 13 MMOL/L (ref 0–18)
AST SERPL-CCNC: 49 U/L (ref ?–34)
BASOPHILS # BLD AUTO: 0.03 X10(3) UL (ref 0–0.2)
BASOPHILS NFR BLD AUTO: 0.5 %
BILIRUB SERPL-MCNC: 2.3 MG/DL (ref 0.3–1.2)
BUN BLD-MCNC: 11 MG/DL (ref 9–23)
CALCIUM BLD-MCNC: 9.6 MG/DL (ref 8.7–10.6)
CHLORIDE SERPL-SCNC: 100 MMOL/L (ref 98–112)
CHOLEST SERPL-MCNC: 202 MG/DL (ref ?–200)
CO2 SERPL-SCNC: 23 MMOL/L (ref 21–32)
COMPLEXED PSA SERPL-MCNC: 1.74 NG/ML (ref ?–4)
CREAT BLD-MCNC: 1.09 MG/DL (ref 0.7–1.3)
EGFRCR SERPLBLD CKD-EPI 2021: 83 ML/MIN/1.73M2 (ref 60–?)
EOSINOPHIL # BLD AUTO: 0.1 X10(3) UL (ref 0–0.7)
EOSINOPHIL NFR BLD AUTO: 1.6 %
ERYTHROCYTE [DISTWIDTH] IN BLOOD BY AUTOMATED COUNT: 12.4 %
EST. AVERAGE GLUCOSE BLD GHB EST-MCNC: 280 MG/DL (ref 68–126)
FASTING PATIENT LIPID ANSWER: YES
FASTING STATUS PATIENT QL REPORTED: YES
GLOBULIN PLAS-MCNC: 2.3 G/DL (ref 2–3.5)
GLUCOSE BLD-MCNC: 351 MG/DL (ref 70–99)
HBA1C MFR BLD: 11.4 % (ref ?–5.7)
HCT VFR BLD AUTO: 44.2 % (ref 39–53)
HDLC SERPL-MCNC: 44 MG/DL (ref 40–59)
HGB BLD-MCNC: 15.5 G/DL (ref 13–17.5)
IMM GRANULOCYTES # BLD AUTO: 0.02 X10(3) UL (ref 0–1)
IMM GRANULOCYTES NFR BLD: 0.3 %
LDLC SERPL CALC-MCNC: 114 MG/DL (ref ?–100)
LYMPHOCYTES # BLD AUTO: 2.37 X10(3) UL (ref 1–4)
LYMPHOCYTES NFR BLD AUTO: 38 %
MCH RBC QN AUTO: 30.2 PG (ref 26–34)
MCHC RBC AUTO-ENTMCNC: 35.1 G/DL (ref 31–37)
MCV RBC AUTO: 86.2 FL (ref 80–100)
MONOCYTES # BLD AUTO: 0.53 X10(3) UL (ref 0.1–1)
MONOCYTES NFR BLD AUTO: 8.5 %
NEUTROPHILS # BLD AUTO: 3.18 X10 (3) UL (ref 1.5–7.7)
NEUTROPHILS # BLD AUTO: 3.18 X10(3) UL (ref 1.5–7.7)
NEUTROPHILS NFR BLD AUTO: 51.1 %
NONHDLC SERPL-MCNC: 158 MG/DL (ref ?–130)
OSMOLALITY SERPL CALC.SUM OF ELEC: 295 MOSM/KG (ref 275–295)
PLATELET # BLD AUTO: 210 10(3)UL (ref 150–450)
POTASSIUM SERPL-SCNC: 4.7 MMOL/L (ref 3.5–5.1)
PROT SERPL-MCNC: 7.2 G/DL (ref 5.7–8.2)
RBC # BLD AUTO: 5.13 X10(6)UL (ref 4.3–5.7)
SODIUM SERPL-SCNC: 136 MMOL/L (ref 136–145)
TRIGL SERPL-MCNC: 254 MG/DL (ref 30–149)
TSI SER-ACNC: 2.4 UIU/ML (ref 0.55–4.78)
VLDLC SERPL CALC-MCNC: 44 MG/DL (ref 0–30)
WBC # BLD AUTO: 6.2 X10(3) UL (ref 4–11)

## 2025-06-24 PROCEDURE — 85025 COMPLETE CBC W/AUTO DIFF WBC: CPT

## 2025-06-24 PROCEDURE — 80053 COMPREHEN METABOLIC PANEL: CPT

## 2025-06-24 PROCEDURE — 83036 HEMOGLOBIN GLYCOSYLATED A1C: CPT

## 2025-06-24 PROCEDURE — 80061 LIPID PANEL: CPT

## 2025-06-24 PROCEDURE — 84443 ASSAY THYROID STIM HORMONE: CPT

## 2025-06-24 PROCEDURE — 36415 COLL VENOUS BLD VENIPUNCTURE: CPT

## 2025-06-25 ENCOUNTER — TELEPHONE (OUTPATIENT)
Dept: FAMILY MEDICINE CLINIC | Facility: CLINIC | Age: 50
End: 2025-06-25

## 2025-06-25 ENCOUNTER — PATIENT MESSAGE (OUTPATIENT)
Dept: FAMILY MEDICINE CLINIC | Facility: CLINIC | Age: 50
End: 2025-06-25

## 2025-06-25 ENCOUNTER — OFFICE VISIT (OUTPATIENT)
Dept: FAMILY MEDICINE CLINIC | Facility: CLINIC | Age: 50
End: 2025-06-25
Payer: MEDICAID

## 2025-06-25 VITALS
DIASTOLIC BLOOD PRESSURE: 80 MMHG | SYSTOLIC BLOOD PRESSURE: 124 MMHG | RESPIRATION RATE: 16 BRPM | HEIGHT: 72 IN | WEIGHT: 283 LBS | HEART RATE: 84 BPM | BODY MASS INDEX: 38.33 KG/M2 | OXYGEN SATURATION: 98 %

## 2025-06-25 DIAGNOSIS — E66.01 SEVERE OBESITY (BMI >= 40) (HCC): ICD-10-CM

## 2025-06-25 DIAGNOSIS — I10 ESSENTIAL HYPERTENSION: ICD-10-CM

## 2025-06-25 DIAGNOSIS — Z00.00 ANNUAL PHYSICAL EXAM: Primary | ICD-10-CM

## 2025-06-25 DIAGNOSIS — E11.9 TYPE 2 DIABETES MELLITUS WITHOUT COMPLICATION, WITHOUT LONG-TERM CURRENT USE OF INSULIN (HCC): ICD-10-CM

## 2025-06-25 DIAGNOSIS — F41.9 ANXIETY AND DEPRESSION: ICD-10-CM

## 2025-06-25 DIAGNOSIS — F41.1 GAD (GENERALIZED ANXIETY DISORDER): ICD-10-CM

## 2025-06-25 DIAGNOSIS — F32.A ANXIETY AND DEPRESSION: ICD-10-CM

## 2025-06-25 PROCEDURE — 99396 PREV VISIT EST AGE 40-64: CPT | Performed by: FAMILY MEDICINE

## 2025-06-25 PROCEDURE — 99214 OFFICE O/P EST MOD 30 MIN: CPT | Performed by: FAMILY MEDICINE

## 2025-06-25 RX ORDER — SEMAGLUTIDE 0.68 MG/ML
0.25 INJECTION, SOLUTION SUBCUTANEOUS WEEKLY
Qty: 2 EACH | Refills: 2 | Status: SHIPPED | OUTPATIENT
Start: 2025-06-25

## 2025-06-25 RX ORDER — FLASH GLUCOSE SCANNING READER
1 EACH MISCELLANEOUS
Qty: 2 EACH | Refills: 11 | Status: SHIPPED | OUTPATIENT
Start: 2025-06-25

## 2025-06-25 NOTE — TELEPHONE ENCOUNTER
Dear Dr. Mueller,    Prior authorization required , please complete chart notes and route back to [facility] in order to complete prior authorization.     Thank You ,  Sol Sands

## 2025-06-25 NOTE — PROGRESS NOTES
The following individual(s) verbally consented to be recorded using ambient AI listening technology and understand that they can each withdraw their consent to this listening technology at any point by asking the clinician to turn off or pause the recording:    Patient name: Bryant Mariscal  Additional names:

## 2025-06-26 NOTE — PROGRESS NOTES
HPI:    Bryant Mariscal is a 49 year old male who presents for Wellness Visit (Reviewed Preventative/Wellness form with patient./)     History of Present Illness  Bryant Mariscal is a 49 year old male with diabetes who presents with elevated blood sugar levels and ongoing elbow pain.    He has ongoing issues with his elbow following two surgeries. The first surgery involved transposition of a displaced nerve, and the second surgery addressed scar tissue formation around the nerve. Despite these interventions, he continues to experience tingling, pressure, and pain in the elbow area, significantly impacting his physical capabilities. He reports 'no strength control' and limitations in daily activities.    He recently discovered elevated blood sugar levels during a medical evaluation required for a job application. He describes being informed that his blood sugar was 'really high'. He experiences fluctuations in appetite, sometimes feeling more hungry and other times not at all. He feels tired but denies dizziness, headaches, or increased thirst. He has not consumed coffee for a month, opting for tea instead, due to concerns about blood pressure. His partner is familiar with administering injections, which may assist in managing his treatment. He has no family history of diabetes. He is currently unemployed and actively seeking work, which has contributed to stress and potentially impacted his eating habits.     Lab Results   Component Value Date    A1C 11.4 (H) 06/24/2025    A1C 5.6 05/10/2016         Past History:   He  has a past medical history of ADD (attention deficit disorder), Essential hypertension, Focal hemorrhagic contusion of cerebrum (HCC), Intraparenchymal hemorrhage of brain (HCC), Migraines, Obstructive sleep apnea (adult) (pediatric) (ward PSG 2-10-17 [place] 4-14-17), Osteoarthrosis, unspecified whether generalized or localized, unspecified site, Sleep apnea, and Spondylosis of lumbar region  without myelopathy or radiculopathy.   He  has a past surgical history that includes ankle fracture surgery; drain/inject large joint/bursa (Bilateral, 10/26/2015); fluoroscopic guidance needle placement (Bilateral, 10/26/2015); other (Left, 09/2017); other surgical history; other surgical history (Right, 03/15/2023); and colonoscopy (N/A, 5/23/2024).   His family history includes Anxiety in his mother; Depression in his mother; Personality Disorder in his mother.   He  reports that he has never smoked. He has never used smokeless tobacco. He reports current alcohol use. He reports that he does not use drugs.     He has a current medication list which includes the following long-term medication(s): ozempic (0.25 or 0.5 mg/dose) and freestyle emma 2 sensor.   He has no known allergies.   Medications Ordered Prior to this Encounter[1]      REVIEW OF SYSTEMS:   Patient denies shortness of breath, denies chest pain and denies any recent fevers or chills.    Patient reports no urinary complaints and denies headaches or visual disturbances.   Patient denies any abdominal pain at this time. Patient has no new skin lesions.  Patient reports no acute back pain and reports no dizziness or headaches.   Patient reports no visual disturbances and reports hearing has been about the same.   Patient reports no recent injury or trauma.               EXAM:    /80   Pulse 84   Resp 16   Ht 6' (1.829 m)   Wt 283 lb (128.4 kg)   SpO2 98%   BMI 38.38 kg/m²  Estimated body mass index is 38.38 kg/m² as calculated from the following:    Height as of this encounter: 6' (1.829 m).    Weight as of this encounter: 283 lb (128.4 kg).    General Appearance:  Alert, cooperative, no distress, appears stated age   Head:  Normocephalic, without obvious abnormality, atraumatic   Eyes:  conjunctiva/cornea is not erythematous.        Nose: No nasal drainage.    Throat: No erythema    Neck: Supple, symmetrical, trachea midline, and normal  ROM  thyroid: no obvious nodules   Back:   Symmetric, no curvature, ROM normal, no CVA tenderness   Lungs:   Clear to auscultation bilaterally, respirations unlabored   Chest Wall:  No tenderness or deformity   Heart:  Regular rate and rhythm, S1, S2 normal, no murmur,   Abdomen:   Soft, non-tender, bowel sounds active. No hernia.    Genitalia:     Rectal:     Extremities: Extremities normal, atraumatic, no cyanosis or edema   Pulses: 2+ and symmetric   Skin: Skin color, texture, turgor normal, no new rashes    Lymph nodes: No obvious cervical adenopathy.    Neurologic and psych: Normal speech, Alert and oriented x 3.   Normal mood, normal insight and judgment.            Assessment & Plan  Diabetes Mellitus Type 2  Uncontrolled diabetes mellitus type 2 with hyperglycemia identified during a job-related medical examination. He reports fatigue but denies dizziness or headaches. The importance of glycemic control to prevent hepatic and renal complications was discussed. He is motivated to manage his condition and open to pharmacotherapy. Ozempic (semaglutide) was discussed as a treatment option, which can lower blood glucose and decrease appetite, potentially aiding in weight loss. The medication is administered as a weekly injection, and he is aware of potential side effects such as nausea, which typically improve after a week. Dietary modifications include reducing carbohydrate and sugar intake while increasing protein and vegetable consumption. Insurance approval for a continuous glucose monitor (Jossue) will be attempted to facilitate easier blood glucose monitoring.  - Prescribe Ozempic (semaglutide) once weekly to lower blood glucose and decrease appetite.  - Refer to a diabetes specialist for further management.  - Instruct him to monitor blood glucose levels three times daily: morning, afternoon, and evening.  - Attempt to obtain insurance approval for a continuous glucose monitor (Jossue).  - Advise dietary  modifications: reduce carbohydrate and sugar intake, increase protein and vegetable consumption.  - Schedule a follow-up appointment in mid to late July to assess progress and adjust treatment as necessary.    Ulnar Nerve Entrapment  Ulnar nerve entrapment at the elbow with a history of two surgeries: nerve transposition and scar tissue removal. He continues to experience tingling, pressure, and pain, indicating ongoing neuropathy.    General Health Maintenance  He is in good health with normotensive blood pressure and normal cholesterol levels. Recent colonoscopy results were normal, with the next screening due in ten years. Prostate-specific antigen levels are normal.           ASSESSMENT AND PLAN:   1. Annual physical exam  -wellness visit was done    2. Essential hypertension  -will start rx as below:  - semaglutide (OZEMPIC, 0.25 OR 0.5 MG/DOSE,) 2 MG/3ML Subcutaneous Solution Pen-injector; Inject 0.25 mg into the skin once a week.  Dispense: 2 each; Refill: 2    3. Anxiety and depression  -stable, CPM    4. Severe obesity (BMI >= 40) ([facility])  -ozempic as below    5. BLANCHE (generalized anxiety disorder)  -stable, CPM    6. Type 2 diabetes mellitus without complication, without long-term current use of insulin (HCC)    - semaglutide (OZEMPIC, 0.25 OR 0.5 MG/DOSE,) 2 MG/3ML Subcutaneous Solution Pen-injector; Inject 0.25 mg into the skin once a week.  Dispense: 2 each; Refill: 2  - Continuous Glucose Sensor (FREESTYLE ALECIA 2 SENSOR) Does not apply Misc; 1 each every 14 (fourteen) days.  Dispense: 2 each; Refill: 11  - Continuous Glucose  (FREESTYLE ALECIA 14 DAY READER) Does not apply Device; 1 each every 14 (fourteen) days.  Dispense: 2 each; Refill: 11     Doron Mueller MD, 6/26/2025, 10:01 AM     Note to patient: The 21st Century Cures Act makes medical notes like these available to patients in the interest of transparency. However, this is a medical document intended as peer to peer communication. It is  written in medical language and may contain abbreviations or verbiage that are unfamiliar. It may appear blunt or direct. Medical documents are intended to carry relevant information, facts as evident, and the clinical opinion of the practitioner who signs the document.        [1]   Current Outpatient Medications on File Prior to Visit   Medication Sig    Tadalafil 10 MG Oral Tab Take 1 tablet (10 mg total) by mouth daily as needed for Erectile Dysfunction.     No current facility-administered medications on file prior to visit.

## 2025-06-27 NOTE — TELEPHONE ENCOUNTER
Jacqueline Magdaleno with [employer] called to verify how many patient was prescribed, I informed 22 for the year. Jacqueline verbalizes understanding

## 2025-07-01 ENCOUNTER — TELEPHONE (OUTPATIENT)
Dept: FAMILY MEDICINE CLINIC | Facility: CLINIC | Age: 50
End: 2025-07-01

## 2025-07-01 DIAGNOSIS — E66.01 SEVERE OBESITY (BMI >= 40) (HCC): Primary | ICD-10-CM

## 2025-07-01 DIAGNOSIS — E11.9 TYPE 2 DIABETES MELLITUS WITHOUT COMPLICATION, WITHOUT LONG-TERM CURRENT USE OF INSULIN (HCC): ICD-10-CM

## 2025-07-01 NOTE — TELEPHONE ENCOUNTER
Rep from [employer] called to verify dosage of Ozempic. Advised them of rx details which would be a 60 day supply with 2 refills. They confirm understanding.     semaglutide (OZEMPIC, 0.25 OR 0.5 MG/DOSE,) 2 MG/3ML Subcutaneous Solution Pen-injector 2 each 2 6/25/2025 --   Sig:   Inject 0.25 mg into the skin once a week.     Route:   Subcutaneous     Order #:   [id number]

## 2025-07-02 NOTE — TELEPHONE ENCOUNTER
Denied    Note from payer: Details of this decision are provided on the physician outcome notice which has been faxed to the number on file.  Payer: [employer] [facility] Case ID: [id number]    [phone]  Electronic appeal: Not supported    Await official denial

## 2025-07-03 NOTE — TELEPHONE ENCOUNTER
Jenny Blount w/ [facility] called to request an order for OT therapy, Ronnie Renee placed order today, I faxed order directly to [facility] in [place] at [phone] as requested and confirmation was received, please be advised. Never smoker

## 2025-07-07 RX ORDER — LIRAGLUTIDE 6 MG/ML
INJECTION SUBCUTANEOUS
Qty: 6 ML | Refills: 1 | Status: SHIPPED | OUTPATIENT
Start: 2025-07-07

## 2025-07-07 NOTE — TELEPHONE ENCOUNTER
Called patient with provider response. He confirms understanding.  He states the FreeStyle Jossue CGM was denied by insurance. He is wondering if there is another glucometer that Dr Mueller wants to order.

## 2025-07-07 NOTE — TELEPHONE ENCOUNTER
Please send him glucometer, along with test strips and lancets enough for 90 days and to test 2-3 times/day if possible.

## 2025-07-08 ENCOUNTER — TELEPHONE (OUTPATIENT)
Dept: FAMILY MEDICINE CLINIC | Facility: CLINIC | Age: 50
End: 2025-07-08

## 2025-07-08 NOTE — TELEPHONE ENCOUNTER
Sosa with [employer] called to ask if pt will need more than 3 pens of Liraglutide per month. Informed her he will not, pt will need 2 pens per month. She verbalized understanding.

## 2025-07-09 NOTE — TELEPHONE ENCOUNTER
Liraglutide (VICTOZA) 18 MG/3ML Subcutaneous Solution Pen-injector     Received fax stating that \"patient needs pen needles, please send rx for pen needles\"

## 2025-07-09 NOTE — TELEPHONE ENCOUNTER
Approved    Prior authorization approved  Payer: [employer] [facility] Case ID: [id number]    [phone]    [phone]  Note from payer: The case has been Approved from  [id number] to [id number]  Approval Details    Authorized from April 9, 2025 to July 8, 2026

## 2025-07-15 RX ORDER — PEN NEEDLE, DIABETIC 32GX 5/32"
NEEDLE, DISPOSABLE MISCELLANEOUS
Qty: 12 EACH | Refills: 0 | Status: SHIPPED | OUTPATIENT
Start: 2025-07-15

## 2025-08-08 ENCOUNTER — OFFICE VISIT (OUTPATIENT)
Dept: FAMILY MEDICINE CLINIC | Facility: CLINIC | Age: 50
End: 2025-08-08

## 2025-08-08 VITALS
DIASTOLIC BLOOD PRESSURE: 80 MMHG | BODY MASS INDEX: 39.01 KG/M2 | HEART RATE: 78 BPM | SYSTOLIC BLOOD PRESSURE: 130 MMHG | RESPIRATION RATE: 21 BRPM | OXYGEN SATURATION: 96 % | HEIGHT: 72 IN | WEIGHT: 288 LBS

## 2025-08-08 DIAGNOSIS — E66.01 SEVERE OBESITY (BMI >= 40) (HCC): ICD-10-CM

## 2025-08-08 DIAGNOSIS — I10 ESSENTIAL HYPERTENSION: ICD-10-CM

## 2025-08-08 DIAGNOSIS — E11.9 TYPE 2 DIABETES MELLITUS WITHOUT COMPLICATION, WITHOUT LONG-TERM CURRENT USE OF INSULIN (HCC): Primary | ICD-10-CM

## 2025-08-08 PROCEDURE — 99214 OFFICE O/P EST MOD 30 MIN: CPT | Performed by: FAMILY MEDICINE

## 2025-08-08 RX ORDER — METFORMIN HYDROCHLORIDE 750 MG/1
750 TABLET, EXTENDED RELEASE ORAL 2 TIMES DAILY WITH MEALS
Qty: 60 TABLET | Refills: 3 | Status: SHIPPED | OUTPATIENT
Start: 2025-08-08

## 2025-08-08 RX ORDER — BLOOD-GLUCOSE METER
1 EACH MISCELLANEOUS AS DIRECTED
COMMUNITY
Start: 2025-07-10

## (undated) DEVICE — SUT MONOCRYL 4-0 PS-2 Y496G

## (undated) DEVICE — STERILE POLYISOPRENE POWDER-FREE SURGICAL GLOVES: Brand: PROTEXIS

## (undated) DEVICE — GAUZE TRAY STERILE 4X4 12PLY

## (undated) DEVICE — 1200CC GUARDIAN II: Brand: GUARDIAN

## (undated) DEVICE — DISPOSABLE BIPOLAR FORCEPS 4" (10.2CM) JEWELERS, STRAIGHT 0.4MM TIP AND 12 FT. (3.6M) CABLE: Brand: KIRWAN

## (undated) DEVICE — KIT CUSTOM ENDOPROCEDURE STERIS

## (undated) DEVICE — DISPOSABLE TOURNIQUET CUFF SINGLE BLADDER, DUAL PORT AND QUICK CONNECT CONNECTOR: Brand: COLOR CUFF

## (undated) DEVICE — UPPER EXTREMITY CDS-LF: Brand: MEDLINE INDUSTRIES, INC.

## (undated) DEVICE — MINI-BLADE®: Brand: BEAVER®

## (undated) DEVICE — SOL NACL IRRIG 0.9% 1000ML BTL

## (undated) DEVICE — CURAD OIL EMLUSION GAUZE 3X3

## (undated) DEVICE — CLOSURE EXOFIN 1.0ML

## (undated) DEVICE — 3M™ STERI-STRIP™ REINFORCED ADHESIVE SKIN CLOSURES, R1547, 1/2 IN X 4 IN (12 MM X 100 MM), 6 STRIPS/ENVELOPE: Brand: 3M™ STERI-STRIP™

## (undated) DEVICE — STERILE POLYISOPRENE POWDER-FREE SURGICAL GLOVES WITH EMOLLIENT COATING: Brand: PROTEXIS

## (undated) DEVICE — KIT VLV 5 PC AIR H2O SUCT BX ENDOGATOR CONN

## (undated) DEVICE — 3M™ RED DOT™ MONITORING ELECTRODE WITH FOAM TAPE AND STICKY GEL, 50/BAG, 20/CASE, 72/PLT 2570: Brand: RED DOT™

## (undated) DEVICE — VIOLET BRAIDED (POLYGLACTIN 910), SYNTHETIC ABSORBABLE SUTURE: Brand: COATED VICRYL

## (undated) DEVICE — INTENDED TO BE USED TO OCCLUDE, RETRACT AND IDENTIFY ARTERIES, VEINS, TENDONS AND NERVES IN SURGICAL PROCEDURES: Brand: STERION®  VESSEL LOOP

## (undated) DEVICE — GOWN,SIRUS,FABRIC-REINFORCED,X-LARGE: Brand: MEDLINE

## (undated) DEVICE — SPLINT PRECUT SYNTH 5X30

## (undated) DEVICE — 10FT COMBINED O2 DELIVERY/CO2 MONITORING. FILTER WITH MICROSTREAM TYPE LUER: Brand: DUAL ADULT NASAL CANNULA

## (undated) DEVICE — ALCOHOL 70% 4 OZ

## (undated) DEVICE — SUT VICRYL 0 UR-6 J603H

## (undated) DEVICE — SLEEVE KENDALL SCD EXPRESS MED

## (undated) DEVICE — SUT MONOCRYL 3-0 PS-2 Y427H

## (undated) NOTE — LETTER
Patient Name: Breana Cooper  YOB: 1975          MRN number:  [id number]  Date:  2/19/2020  Referring Physician:  No ref.  provider found         LOWER EXTREMITY EVALUATION:    Referring Physician: Mr. Timmie Dancer  Diagnosis: Right Knee Accessory motion: The patient presents with moderate restrictions of the patellar medial glide. Flexibility: The patient presents with moderate restrictions of the hamstrings and rectus femoris.   Significant restrictions of the bilateral calves for treatment options and has agreed to actively participate in planning and for this course of care. Thank you for your referral. Please co-sign or sign and return this letter via fax as soon as possible to [phone].  If you have any questions, please c

## (undated) NOTE — LETTER
06/05/23    Patient Name: Stacey Menendez      To Whom It May Concern: The above patient was seen at the [facility] for treatment of a medical condition. No lifting, pushing, pulling, or carrying greater than 1 lbs with right upper extremity. Use of telephone and typing/computer use is okay. Sincerely,      Ute Treadwell MD  Hand, Wrist, & Elbow Surgery  [facility]  [facility] access [facility] [address] [facility], Dinorah Salazar [facility] 93 org  [email or web address]  t: [phone]  f: [phone]

## (undated) NOTE — LETTER
Date: 1/10/2025    Patient Name: Bryant Mariscal          To Whom it may concern:    The above patient was seen at [facility] for treatment of a medical condition.    Patient can return to work in following restrictions:    No lifting, pushing, pulling, or carrying greater than 20 lbs with right hand.    Re-eval in 4 weeks.      Sincerely,      Jad Little MD   Hand, Wrist, & Elbow Surgery  [email or web address]  t: [phone]  f: [phone]

## (undated) NOTE — LETTER
April 8, 2020    Patient: Christine Lomax   YOB: 1975     Dear Employer,  At [facility], we are taking special precautions and doing everything we can to prevent the spread of COVID-19 (coronavirus disease 2019).  During th or medication, chronic respiratory or heart conditions and diabetes).   • During the social distancing period imposed by the [place] in March, any employee who can be isolated at home and avoid exposure to the virus from colleagues would be at [employer]

## (undated) NOTE — Clinical Note
[facility], [facility], [address]  Dept: [phone]  Dept Fax: [phone]         January 11, 2017    Patient: Jamal Eisenberg   YOB: 1975   Date of Visit: 1/11/2017     To Whom It [facility]

## (undated) NOTE — Clinical Note
Date: 1/11/2017    Patient Name: Nivia Closs          To Whom it may concern: The above patient was seen at the [facility] for treatment of a medical condition.     This patient should be excused from attending work from 1/11/17 thr

## (undated) NOTE — LETTER
Date: 5/13/2024    Patient Name: Bryant Mariscal          To Whom it may concern:    This letter has been written at the patient's request. The above patient was seen at [facility] for treatment of a medical condition.    He may proceed with light duty, no use of the left upper extremity in the inteval.       Sincerely,        Ede Sarmiento MD  Knee, Shoulder, & Elbow Surgery / Sports Medicine Specialist  Orthopaedic Surgery  [address] [place] [address].[email or web address]  [email or web address]  t: [phone]  o: [phone]  f: [phone]

## (undated) NOTE — LETTER
10/2/23    Patient Name: Caity EidLanieSebastien      To Whom It May Concern: The above patient was seen at the [facility] for treatment of a medical condition. No lifting, pushing, pulling, or carrying greater than 10 lbs with right upper extremity. Sincerely,      Apple Kahn MD  Hand, Wrist, & Elbow Surgery  [facility]  [facility], [facility] [address], [address] [facility], Dinorah Salazar [place] 93 org  [email or web address]  t: [phone]  f: [phone]

## (undated) NOTE — LETTER
Date: 8/25/2022    Patient Name: Charanjit Mendez          To Whom it may concern: This letter has been written at the patient's request. The above patient was seen at the [facility] for treatment of a medical condition. He can return to work sedentary duty only, desk work only. No lifting, pushing, pulling or repetitive use of the right upper extremity. We have recommended advanced imaging for further evaluation, scheduled on 09/02/2022. He can follow up for review of results once on 09/07/2022. Restrictions in place until appointment on 09/07/2022. Sincerely,          BOBBY Manzanares PA-C Orthopedic Surgery / Sports Medicine Specialist  [facility]  [address], [address]   [facility]  [email or web address]  t: [phone]  o: [phone]  f: [phone]          This note was dictated using Dragon software. While it was briefly proofread prior to completion, some grammatical, spelling, and word choice errors due to dictation may still occur.

## (undated) NOTE — LETTER
Patient Name: Mode Hawthorne      To Whom It May Concern: The above patient was seen at the [facility] for treatment of a medical condition. No lifting, pushing, pulling, or carrying greater than 10 lbs with right upper extremity. Sincerely,      Luis Nur MD  Hand, Wrist, & Elbow Surgery  [facility]  [address] [place] [address], [address] [place] [address]  [email or web address]  t: [phone]  f: [phone]

## (undated) NOTE — LETTER
Date: 12/16/2017    Patient Name: Victoria Ernst          To Whom it may concern: This letter has been written at the patient's request. The above patient was seen at the [place] [facility] for treatment of a medical condition.     The patient

## (undated) NOTE — MR AVS SNAPSHOT
After Visit Summary   4/14/2017    Becka Sesay    MRN: [id number]           Visit Information        Provider Department Dept Phone    4/14/2017 10:00 PM Bed1  Sleep Clinic [phone]      Allergies as of 4/14/2017  Reviewed on: 3/15/2017 increments are effective and add up over the week   2 ½ hours per week – spread out over time Use a domitila to keep you motivated   Don’t forget strength training with weights and resistance Set goals and track your progress   You don’t need to join a gym.

## (undated) NOTE — Clinical Note
Dear Dr. Gavin Patel,       Thank you for referring Dillan Musa to the [facility].   Sincerely,  CLINT Franco

## (undated) NOTE — LETTER
Date: 9/14/2022    Patient Name: Chuy Haddad          To Whom it may concern: This letter has been written at the patient's request. The above patient was seen at the [facility] for treatment of a medical condition. He can return to work sedentary duty only, desk work only. No lifting, pushing, pulling or repetitive use of the right upper extremity. Sincerely,          BOBBY Ramos, SRUTHI Orthopedic Surgery / Sports Medicine Specialist  [facility]  [address] , Donell Yan [address] [place] [address]. [facility]  [email or web address]  t: [phone]  o: [phone]  f: [phone]          This note was dictated using Dragon software. While it was briefly proofread prior to completion, some grammatical, spelling, and word choice errors due to dictation may still occur.

## (undated) NOTE — Clinical Note
Date: 1/11/2017    Patient Name: Yuriy Rodrigues          To Whom it may concern: This letter has been written at the patient's request. The above patient was seen at the [facility] for treatment of a medical condition.     This patient

## (undated) NOTE — LETTER
Date: 4/11/2025    Patient Name: Bryant Mariscal      To Whom it may concern:     The above patient was seen at [facility] for treatment of a medical condition.    Patient is at MMI and permanent restrictions based on the functional capacity evaluation.  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      Sincerely,      Jad Little MD   Hand, Wrist, & Elbow Surgery  [email or web address]  t: [phone]  f: [phone]

## (undated) NOTE — LETTER
Date: 9/30/2024    Patient Name: Bryant Mariscal          To Whom it may concern:    The above patient was seen at [facility] for treatment of a medical condition.    Patient can return to work in following restrictions:    No lifting, pushing, pulling, or carrying greater than 5 lbs with right hand.     Re-eval in 4 weeks.      Sincerely,      Jad Little MD   Hand, Wrist, & Elbow Surgery  [email or web address]  t: [phone]  f: [phone]

## (undated) NOTE — LETTER
Date: 12/4/2023    Patient Name: Lisa Montejo          To Whom it may concern: The above patient was seen at the [facility] for treatment of a medical condition. Patient to remain off work until 12/21/23. Patient can return to work on 12/21/23 without restrictions. Sincerely,    Brian Yusuf MD  Hand, Wrist, & Elbow Surgery  [facility]  [facility] [address], [address] [facility], [address] [place] [address]  [email or web address]  t: [phone]  f: [phone]

## (undated) NOTE — LETTER
11/6/23    Patient Name: Felicitas Mariscal      To Whom It May Concern: The above patient was seen at the [facility] for treatment of a medical condition. No lifting, pushing, pulling, or carrying greater than 25 lbs with right upper extremity. Sincerely,      Cornelius Porras MD  Hand, Wrist, & Elbow Surgery  [facility]  [facility] [address], [address] [facility], Dinorah Salazar [place] 93 org  [email or web address]  t: [phone]  f: [phone]

## (undated) NOTE — LETTER
Date: 8/12/2024    Patient Name: Bryant Mariscal          To Whom it may concern:    The above patient was seen at [facility] for treatment of a medical condition.    Patient to remain off work. Re-eval in 2 weeks.        Sincerely,        Jad Little MD   Hand, Wrist, & Elbow Surgery  [email or web address]  t: [phone]  f: [phone]

## (undated) NOTE — LETTER
Patient Name: Christine Lomax  YOB: 1975          MRN number:  [id number]  Date:  3/16/2020  Referring Physician:  Lori Menon    Dear Mr. Bernarda Hernandez,    Discharge Summary  Pt has attended 8 visits in Physical Therapy.    Dx: Right Knee MCL S Patient/Family/Caregiver was advised of these findings, precautions, and treatment options and has agreed to actively participate in planning and for this course of care.     Thank you for your referral. If you have any questions, please contact me at Dept:

## (undated) NOTE — LETTER
Date: 1/10/2025    Patient Name: Bryant Mariscal          To Whom it may concern:    The above patient was seen at [facility] for treatment of a medical condition.    Patient can return to work in following restrictions:    No lifting, pushing, pulling, or carrying greater than 15 lbs with right hand.    Re-eval in 4 weeks.      Sincerely,      Jad Little MD   Hand, Wrist, & Elbow Surgery  [email or web address]  t: [phone]  f: [phone]

## (undated) NOTE — LETTER
Date: 6/1/2018    Patient Name: Lenka Moreira          To Whom it may concern: The above patient was seen at the [facility] for treatment of a medical condition. This patient should be excused from attending work 6/1/18-6/2/18.

## (undated) NOTE — MR AVS SNAPSHOT
[address]  [address]  [address] [address]  [phone]               Thank you for choosing us for your health care visit with AL Weston.   We are glad to serve you and happy to provide you with this summary of taking this medication, and follow the directions you see here. Commonly known as:  ADDERALL           BuPROPion HCl ER (SR) 150 MG Tb12   Take 1 tablet (150 mg total) by mouth 2 (two) times daily.    Commonly known as:  [facility] SR           escitalopr office, you can view your past visit information in Pikum by going to Visits < Visit Summaries. Pikum questions? Call [phone] for help. Pikum is NOT to be used for urgent needs. For medical emergencies, dial 911.            Visit EDWARD-EL

## (undated) NOTE — MR AVS SNAPSHOT
[address] [facility] [address] [phone]               Thank you for choosing us for your health care visit with Yasmine Romero MD.  We are glad to serve you and happy to provide you with this summary of your and this prescription was added. Make sure you understand how and when to take each. Commonly known as:  ADDERALL XR           * amphetamine-dextroamphetamine 10 MG Tabs   Take 1 tablet (10 mg total) by mouth daily. What changed:   You were already [employer] ibuprofen 400 MG Tabs   Take 400 mg by mouth every 6 (six) hours as needed for Pain. Commonly known as:  MOTRIN           Tadalafil 20 MG Tabs   Take 1 tablet (20 mg total) by mouth as needed for Erectile Dysfunction.    Commonly known as:  CIALIS requirements for authorization, please wait 5-7 days and then contact your physician's   office. At that time, you will be provided with any authorization numbers or be assured that none are required. You can then schedule your appointment.  Failure to obta

## (undated) NOTE — Clinical Note
Date: 2/7/2017    Patient Name: Avery Marquis          To Whom it may concern: This letter has been written at the patient's request. The above patient was seen at the [facility] for treatment of a medical condition.     This patient s

## (undated) NOTE — LETTER
[facility] CARE IN [place]  [address] [address]  Dept: [phone]  Dept Fax: [phone]      March 10, 2018    Patient: Jamal Eisenberg   Date of Visit: 3/10/2018       To Whom It May Concern:    Jamal Eisenberg was see

## (undated) NOTE — LETTER
Date: 3/27/2023    Patient Name: Nicki Mariscal          To Whom it may concern: The above patient was seen at the [facility] for treatment of a medical condition. This patient should remain off work until next visit in 4 weeks.          Sincerely,    ASHLEY Jordan

## (undated) NOTE — LETTER
Date: 8/26/2024    Patient Name: Bryant Mariscal          To Whom it may concern:    The above patient was seen at [facility] for treatment of a medical condition.    Patient to remain off work. Re-eval in 5 weeks.      Sincerely,      Jad Little MD   Hand, Wrist, & Elbow Surgery  [email or web address]  t: [phone]  f: [phone]

## (undated) NOTE — LETTER
[facility] CARE IN [place]  [address] [address]  Dept: [phone]  Dept Fax: [phone]         May 8, 2018    Patient: Parish Bobo   YOB: 1975   Date of Visit: 5/8/2018       To Whom It May Concern:

## (undated) NOTE — LETTER
06/19/2024     Patient Name: Bryant Davey        To Whom It May Concern:     The above patient was seen at the [facility] for treatment of a medical condition.       No lifting, pushing, pulling, or carrying greater than 25 lbs with right upper extremity. Surgery scheduled for July 30th     Sincerely,         Jad Little MD   Hand, Wrist, & Elbow Surgery  [email or web address]  t: [phone]  f: [phone]             RE: Bryant Mariscal -- MR#: [id number]

## (undated) NOTE — ED AVS SNAPSHOT
[facility] in [address]    Phone:  [phone]    Fax:  [phone]           Christina Cooney   MRN: [id number]    Department:  [facility]   Date of Visit:  6/2/2017 You may ice through a brace or ace wrap. Compression: Compression to the area will help control swelling. An ace wrap is the most simple form of compression. You can also wear a brace. Elevation: Raise the injured extremity above heart level.  Tommie Colon a substitute for ongoing medical care. Often, one Immediate Care visit does not uncover every injury or illness.  If you have been referred to a primary care or a specialist physician for a follow-up visit, please tell this physician (or your personal docto Duncan Wilson 0600 [address] [facility] ([address]) [phone]                Additional Information       We are concerned for your overall well being:    - If you are a smoker or have smoked in the last 12 months, we encourage you to explore options for MEENA ANDRADE [facility] medial and lateral malleolus appear intact as does the talus. Incidental note made of retro and plantar calcaneal spurs.                 MyChart     Visit MyChart  You can access your MyChart to more actively manage your health care and view more details fr

## (undated) NOTE — LETTER
[facility] CARE IN [place] [id number] [address]  Dept: [phone]  Dept Fax: [phone]      June 2, 2017    Patient: Jamal Eisenberg   Date of Visit: 6/2/2017       To Whom It May Concern:    Jamal Eisenberg was seen a

## (undated) NOTE — LETTER
Patient Name: Chon Grider  YOB: 1975          MRN number:  [id number]  Date:  2/14/2018  Referring Physician:  Eze Alanis    Discharge Summary  Initial Functional Outcome Score 41/100  Final Functional Outcome Score 99/100  Number has been managing pain independently for over 6 months. Plan: Patient is discharged to [facility].      Patient/Family/Caregiver was advised of these findings, precautions, and treatment options and has agreed to actively participate in planning and for this co

## (undated) NOTE — Clinical Note
04/10/2017        Be Aranda  [address] [facility]      Dear Kellen Piedra,    [address] [facility] records indicate that you have outstanding lab work and or testing that was ordered for you and has not yet been completed:      CBC With Diff  CMP  Lip

## (undated) NOTE — LETTER
03/6/2024     Patient Name: Bryant Davey        To Whom It May Concern:     The above patient was seen at the [facility] for treatment of a medical condition.       No lifting, pushing, pulling, or carrying greater than 25 lbs with right upper extremity. Surgery scheduled for April 1st     Sincerely,         Jad Little MD   Hand, Wrist, & Elbow Surgery  [email or web address]  t: [phone]  f: [phone]             RE: Bryant Mariscal -- MR#: [id number]

## (undated) NOTE — MR AVS SNAPSHOT
After Visit Summary   2/10/2017    Lennie Hunt    MRN: [id number]           Visit Information        Provider Department Dept Phone    2/10/2017 10:00 PM Bed1  Sleep Clinic [phone]      Allergies as of 2/10/2017  Reviewed on: 2/7/2017

## (undated) NOTE — LETTER
Date & Time: 11/10/2022, 4:04 PM  Patient: Anjana Fairbanks  Encounter Provider(s):    ASHLEY De Leon       To Whom It May Concern:    Anjana Fairbanks was seen and treated in our department on 11/10/2022. Patient will return to work on Monday if feeling better and fever free.     If you have any questions or concerns, please do not hesitate to call.        _____________________________  Physician/APC Signature

## (undated) NOTE — LETTER
02/12/2024     Patient Name: Bryant Davey        To Whom It May Concern:     The above patient was seen at the [facility] for treatment of a medical condition.       No lifting, pushing, pulling, or carrying greater than 25 lbs with right upper extremity. Re-evaluate after MRI is completed.     Sincerely,         Jad Little MD   Hand, Wrist, & Elbow Surgery  [email or web address]  t: [phone]  f: [phone]             RE: Bryant Mariscal -- MR#: [id number]

## (undated) NOTE — LETTER
To Whom It May Concern:    Xi Frank is currently under my medical care and may not return to work at this time. Please excuse Nick Webb for 2 weeks. He may return to work on 6/15/2021. Activity is restricted as follows: light duty.     If you req

## (undated) NOTE — LETTER
Date: 6/7/2021    Patient Name: Alejandro Viramontes          To Whom it may concern: This letter has been written at the patient's request. The above patient was seen at the [facility] for treatment of a medical condition.     This patient s

## (undated) NOTE — LETTER
Date: 4/24/2023    Patient Name: Charanjit Mendez          To Whom it may concern: The above patient was seen at the [facility] for treatment of a medical condition. This patient should be excused from work for an additional 6 weeks until his next appointment.          Sincerely,    ASHLEY Reno

## (undated) NOTE — LETTER
Date: 4/14/2021    Patient Name: Juan Ames          To Whom it may concern: This patient should be excused from attending work 4/14/21 thru 4/18/21    The patient may return to work on 4/19/21.         Sincerely,    Marilyn Keller MD

## (undated) NOTE — LETTER
Patient Name: Cherelle Guillen      To Whom It May Concern: The above patient was seen at the [facility] for treatment of a medical condition. No lifting, pushing, pulling, or carrying greater than 5 lbs with right upper extremity. Sincerely,      Vidhi Malik MD  Hand, Wrist, & Elbow Surgery  [facility]  [facility] [address], [address] [facility], [facility] [place] [address] org  [email or web address]  t: [id number]  f: [phone]

## (undated) NOTE — LETTER
Date: 7/27/2022    Patient Name: Author Johnson          To Whom it may concern: This letter has been written at the patient's request. The above patient was seen at one of the [facility] locations for treatment of a medical condition. The patient may return to work with the following restrictions:  No kneeling, stooping or squatting, No ladders and No lifting or carrying more than 3 lbs. 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 No tight gripping or use of power tools. Re-evaluation in 4 weeks. Sincerely,      Radha Epps. Spring Callejas MD  Knee, Shoulder, & Elbow Surgery / Sports Medicine Specialist  [facility]  [place] [address], [facility], [address] [facility]. [email or web address]  t: [phone]  o: [phone]  f: [phone]

## (undated) NOTE — LETTER
Date: 8/25/2022    Patient Name: Jakub Soler          To Whom it may concern: This letter has been written at the patient's request. The above patient was seen at the [facility] for treatment of a medical condition. He can return to work sedentary duty only, desk work only. No lifting, pushing, pulling or repetitive use of the right upper extremity. Sincerely,          Jeb Moraes [facility], PAALEXANDER Orthopedic Surgery / Sports Medicine Specialist  [facility]  [address] , Donell Yan [address] [place] [address]. [facility]  [email or web address]  t: [phone]  o: [phone]  f: [phone]          This note was dictated using Dragon software. While it was briefly proofread prior to completion, some grammatical, spelling, and word choice errors due to dictation may still occur.

## (undated) NOTE — LETTER
07/10/23    Patient Name: Author Alex      To Whom It May Concern: The above patient was seen at the [facility] for treatment of a medical condition. No lifting, pushing, pulling, or carrying greater than 1 lbs with right upper extremity. Use of telephone and typing/computer use is okay. Sincerely,      Mariza Hawkins MD  Hand, Wrist, & Elbow Surgery  [facility]opaedic Surgery  [facility] 178, [address] [facility], Dinorah Salazar [facility] 93 org  [email or web address]  t: [id number]  f: [phone]

## (undated) NOTE — LETTER
[facility] CARE IN [place]  [address] [address]  Dept: [phone]  Dept Fax: [phone]         March 13, 2018    Patient: Kemi Hoffmann   YOB: 1975   Date of Visit: 3/10/2018       To Whom It May Concer

## (undated) NOTE — LETTER
01/31/2024     Patient Name: Bryant Mariscal        To Whom It May Concern:     The above patient was seen at the [facility] for treatment of a medical condition.     Please be advised that we recommend the following restrictions until Bryant is re-evaluated on February 12, 2024.     No lifting, pushing, pulling, or carrying greater than 25 lbs with right upper extremity.      Sincerely,       Jad Little MD  Hand, Wrist, & Elbow Surgery  [facility]  [address] [place] [address], [address], [email or web address]  [email or web address]  t: [phone]  f: [phone]           RE: Bryant Mariscal -- MR#: [id number]

## (undated) NOTE — LETTER
Date: 3/6/2024    Patient Name: Bryant Mariscal          To Whom it may concern:    The above patient was seen at [facility] for treatment of a medical condition.    Patient has recurrence of cubital tunnel syndrome secondary to scar tissue formation which is evident on MRI and described as edema of ulnar nerve, fascicular thickening. Surgical plan would be for revision cubital tunnel release with nerve wrap on April 1st, 2024.         Sincerely,      Jad Little MD   Hand, Wrist, & Elbow Surgery  [email or web address]  t: [phone]  f: [phone]

## (undated) NOTE — Clinical Note
Dear Dr. Little,  I had the opportunity to see your patient Bryant Mariscal for an EMG recently. I appreciate your confidence in me to care for your patients. Please feel free call me with any questions at [phone] or contact me through Epic.  Sincerely, Vik Henderson MD Board Certified, Physical Medicine and Rehabilitation Specializing in Sports Medicine, Spine Medicine and Electrodiagnostic Medicine [facility]

## (undated) NOTE — LETTER
Date: 10/25/2024    Patient Name: Bryant Mariscal          To Whom it may concern:    The above patient was seen at [facility] for treatment of a medical condition.    Patient can return to work in following restrictions:    No lifting, pushing, pulling, or carrying greater than 10 lbs with right hand.     Re-eval in 4 weeks.      Sincerely,      Jad Little MD   Hand, Wrist, & Elbow Surgery  [email or web address]  t: [phone]  f: [phone]

## (undated) NOTE — LETTER
Date: 2/7/2025    Patient Name: Bryant Mariscal          To Whom it may concern:    The above patient was seen at [facility] for treatment of a medical condition.    Patient can return to work in following restrictions:    No lifting, pushing, pulling, or carrying greater than 20 lbs with right hand.    Re-eval in 4 weeks.      Sincerely,      Jad Little MD   Hand, Wrist, & Elbow Surgery  [email or web address]  t: [phone]  f: [phone]